# Patient Record
Sex: FEMALE | Race: WHITE | NOT HISPANIC OR LATINO | Employment: OTHER | URBAN - METROPOLITAN AREA
[De-identification: names, ages, dates, MRNs, and addresses within clinical notes are randomized per-mention and may not be internally consistent; named-entity substitution may affect disease eponyms.]

---

## 2017-02-06 ENCOUNTER — HISTORICAL (OUTPATIENT)
Dept: LAB | Facility: HOSPITAL | Age: 62
End: 2017-02-06

## 2017-03-03 ENCOUNTER — HISTORICAL (OUTPATIENT)
Dept: RADIOLOGY | Facility: HOSPITAL | Age: 62
End: 2017-03-03

## 2017-08-24 ENCOUNTER — HISTORICAL (OUTPATIENT)
Dept: LAB | Facility: HOSPITAL | Age: 62
End: 2017-08-24

## 2017-08-24 LAB
ALBUMIN SERPL-MCNC: 3.5 GM/DL (ref 3.4–5)
ALBUMIN/GLOB SERPL: 1.2 RATIO (ref 1.1–2)
ALP SERPL-CCNC: 66 UNIT/L (ref 46–116)
ALT SERPL-CCNC: 28 UNIT/L (ref 12–78)
AST SERPL-CCNC: 21 UNIT/L (ref 15–37)
BILIRUB SERPL-MCNC: 0.8 MG/DL (ref 0.2–1)
BILIRUBIN DIRECT+TOT PNL SERPL-MCNC: 0.15 MG/DL (ref 0–0.2)
BILIRUBIN DIRECT+TOT PNL SERPL-MCNC: 0.65 MG/DL (ref 0–0.8)
BUN SERPL-MCNC: 19.6 MG/DL (ref 7–18)
CALCIUM SERPL-MCNC: 9.3 MG/DL (ref 8.5–10.1)
CHLORIDE SERPL-SCNC: 108 MMOL/L (ref 98–107)
CHOLEST SERPL-MCNC: 261 MG/DL (ref 0–200)
CHOLEST/HDLC SERPL: 4.4 {RATIO} (ref 0–4)
CO2 SERPL-SCNC: 27.4 MMOL/L (ref 21–32)
CREAT SERPL-MCNC: 0.95 MG/DL (ref 0.6–1.3)
GLOBULIN SER-MCNC: 3 GM/DL (ref 2.4–3.5)
GLUCOSE SERPL-MCNC: 105 MG/DL (ref 74–106)
HDLC SERPL-MCNC: 60 MG/DL (ref 40–60)
LDLC SERPL CALC-MCNC: 180 MG/DL (ref 0–129)
POTASSIUM SERPL-SCNC: 4 MMOL/L (ref 3.5–5.1)
PROT SERPL-MCNC: 6.5 GM/DL (ref 6.4–8.2)
SODIUM SERPL-SCNC: 145 MMOL/L (ref 136–145)
TRIGL SERPL-MCNC: 103 MG/DL
VLDLC SERPL CALC-MCNC: 21 MG/DL

## 2017-10-06 ENCOUNTER — HISTORICAL (OUTPATIENT)
Dept: RADIOLOGY | Facility: HOSPITAL | Age: 62
End: 2017-10-06

## 2017-10-27 ENCOUNTER — HISTORICAL (OUTPATIENT)
Dept: LAB | Facility: HOSPITAL | Age: 62
End: 2017-10-27

## 2017-10-27 LAB
CHOLEST SERPL-MCNC: 257 MG/DL (ref 0–200)
CHOLEST/HDLC SERPL: 4.2 {RATIO} (ref 0–4)
CRP SERPL HS-MCNC: 2.02 MG/L (ref 0–3)
HDLC SERPL-MCNC: 61 MG/DL (ref 40–60)
LDLC SERPL CALC-MCNC: 170 MG/DL (ref 0–129)
TRIGL SERPL-MCNC: 132 MG/DL
VLDLC SERPL CALC-MCNC: 26 MG/DL

## 2017-11-29 ENCOUNTER — HISTORICAL (OUTPATIENT)
Dept: LAB | Facility: HOSPITAL | Age: 62
End: 2017-11-29

## 2017-11-29 LAB
CHOLEST SERPL-MCNC: 202 MG/DL (ref 0–200)
CHOLEST/HDLC SERPL: 2.8 {RATIO} (ref 0–4)
HDLC SERPL-MCNC: 72 MG/DL (ref 40–60)
LDLC SERPL CALC-MCNC: 115 MG/DL (ref 0–129)
TRIGL SERPL-MCNC: 76 MG/DL
VLDLC SERPL CALC-MCNC: 15 MG/DL

## 2018-02-22 ENCOUNTER — HISTORICAL (OUTPATIENT)
Dept: LAB | Facility: HOSPITAL | Age: 63
End: 2018-02-22

## 2018-03-05 ENCOUNTER — HISTORICAL (OUTPATIENT)
Dept: LAB | Facility: HOSPITAL | Age: 63
End: 2018-03-05

## 2018-08-18 ENCOUNTER — HISTORICAL (OUTPATIENT)
Dept: LAB | Facility: HOSPITAL | Age: 63
End: 2018-08-18

## 2019-02-02 ENCOUNTER — HISTORICAL (OUTPATIENT)
Dept: LAB | Facility: HOSPITAL | Age: 64
End: 2019-02-02

## 2019-02-02 LAB
ABS NEUT (OLG): 1.59 X10(3)/MCL (ref 2.1–9.2)
ALBUMIN SERPL-MCNC: 3.7 GM/DL (ref 3.4–5)
ALBUMIN/GLOB SERPL: 1.2 RATIO (ref 1.1–2)
ALP SERPL-CCNC: 81 UNIT/L (ref 46–116)
ALT SERPL-CCNC: 28 UNIT/L (ref 12–78)
AST SERPL-CCNC: 22 UNIT/L (ref 15–37)
BASOPHILS # BLD AUTO: 0 X10(3)/MCL (ref 0–0.2)
BASOPHILS NFR BLD AUTO: 1 %
BILIRUB SERPL-MCNC: 1.1 MG/DL (ref 0.2–1)
BILIRUBIN DIRECT+TOT PNL SERPL-MCNC: 0.18 MG/DL (ref 0–0.2)
BILIRUBIN DIRECT+TOT PNL SERPL-MCNC: 0.92 MG/DL (ref 0–0.8)
BUN SERPL-MCNC: 16.1 MG/DL (ref 7–18)
CALCIUM SERPL-MCNC: 9.3 MG/DL (ref 8.5–10.1)
CHLORIDE SERPL-SCNC: 104 MMOL/L (ref 98–107)
CHOLEST SERPL-MCNC: 219 MG/DL (ref 0–200)
CHOLEST/HDLC SERPL: 3.7 {RATIO} (ref 0–4)
CO2 SERPL-SCNC: 27 MMOL/L (ref 21–32)
CREAT SERPL-MCNC: 0.96 MG/DL (ref 0.6–1.3)
EOSINOPHIL # BLD AUTO: 0.2 X10(3)/MCL (ref 0–0.9)
EOSINOPHIL NFR BLD AUTO: 4 %
ERYTHROCYTE [DISTWIDTH] IN BLOOD BY AUTOMATED COUNT: 13.3 % (ref 11.5–17)
GLOBULIN SER-MCNC: 3.1 GM/DL (ref 2.4–3.5)
GLUCOSE SERPL-MCNC: 94 MG/DL (ref 74–106)
HCT VFR BLD AUTO: 42 % (ref 37–47)
HDLC SERPL-MCNC: 59 MG/DL (ref 40–60)
HGB BLD-MCNC: 13.8 GM/DL (ref 12–16)
LDLC SERPL CALC-MCNC: 139 MG/DL (ref 0–129)
LYMPHOCYTES # BLD AUTO: 1.4 X10(3)/MCL (ref 0.6–4.6)
LYMPHOCYTES NFR BLD AUTO: 38 %
MCH RBC QN AUTO: 28.1 PG (ref 27–31)
MCHC RBC AUTO-ENTMCNC: 32.9 GM/DL (ref 33–36)
MCV RBC AUTO: 85.5 FL (ref 80–94)
MONOCYTES # BLD AUTO: 0.4 X10(3)/MCL (ref 0.1–1.3)
MONOCYTES NFR BLD AUTO: 12 %
NEUTROPHILS # BLD AUTO: 1.59 X10(3)/MCL (ref 1.4–7.9)
NEUTROPHILS NFR BLD AUTO: 45 %
PLATELET # BLD AUTO: 239 X10(3)/MCL (ref 130–400)
PMV BLD AUTO: 10.9 FL (ref 9.4–12.4)
POTASSIUM SERPL-SCNC: 4.1 MMOL/L (ref 3.5–5.1)
PROT SERPL-MCNC: 6.8 GM/DL (ref 6.4–8.2)
RBC # BLD AUTO: 4.91 X10(6)/MCL (ref 4.2–5.4)
SODIUM SERPL-SCNC: 141 MMOL/L (ref 136–145)
TRIGL SERPL-MCNC: 103 MG/DL
VLDLC SERPL CALC-MCNC: 21 MG/DL
WBC # SPEC AUTO: 3.6 X10(3)/MCL (ref 4.5–11.5)

## 2019-07-26 ENCOUNTER — HISTORICAL (OUTPATIENT)
Dept: LAB | Facility: HOSPITAL | Age: 64
End: 2019-07-26

## 2019-07-29 ENCOUNTER — TELEPHONE (OUTPATIENT)
Dept: ORTHOPEDICS | Facility: CLINIC | Age: 64
End: 2019-07-29

## 2019-07-29 NOTE — TELEPHONE ENCOUNTER
Received a return call from patient, informed that all documentation has been received and her chart will be submitted to the physicians for evaluation.  Writer explained time line.  Ms. Goins verbalized understanding.

## 2019-07-29 NOTE — TELEPHONE ENCOUNTER
Left message for patient to return call.  Phoned patient to inform of submittal of chart to the physicians for review/approval.

## 2019-08-06 ENCOUNTER — TELEPHONE (OUTPATIENT)
Dept: ORTHOPEDICS | Facility: CLINIC | Age: 64
End: 2019-08-06

## 2019-08-06 NOTE — TELEPHONE ENCOUNTER
"I spoke to the pt, informed her that the medical MD had the following questions:    Pt  denies gallstone problem, jaundice, and right sided abdominal pain and states her weight is stable at 170lbs     Pt  states she is reasonably ambulatory and denies any issues neurologically.    We are requesting past LFT"s and once obtained we will notify and scan into media for review. And call the pt to notify of findings. Understanding verbalized.    "

## 2019-08-07 ENCOUNTER — TELEPHONE (OUTPATIENT)
Dept: ORTHOPEDICS | Facility: CLINIC | Age: 64
End: 2019-08-07

## 2019-08-07 NOTE — TELEPHONE ENCOUNTER
Informed pt that we received the past labs requested by our MD, I have submitted for review, will call once we hear back from the MD. Understanding verbalized.

## 2019-08-12 ENCOUNTER — TELEPHONE (OUTPATIENT)
Dept: ORTHOPEDICS | Facility: CLINIC | Age: 64
End: 2019-08-12

## 2019-08-12 NOTE — TELEPHONE ENCOUNTER
Spoke with patient regarding surgery:    - Informed of surgery date: 8-29-19, she accepted    - Potential dates for Dr. Cervantes's call: anytime after 2 pm on any week day    **she gets off from work at 2 pm.    - Telemed visit scheduled for 8-20-19 @ 2:30    - She wants to go to [facility] or [facility] both in [place], [place] - patient to provide contact information at later date    Patient verbalized understanding of above.

## 2019-08-14 DIAGNOSIS — M17.12 PRIMARY OSTEOARTHRITIS OF LEFT KNEE: Primary | ICD-10-CM

## 2019-08-15 DIAGNOSIS — M17.12 PRIMARY OSTEOARTHRITIS OF LEFT KNEE: Primary | ICD-10-CM

## 2019-08-19 ENCOUNTER — TELEPHONE (OUTPATIENT)
Dept: ORTHOPEDICS | Facility: CLINIC | Age: 64
End: 2019-08-19

## 2019-08-19 NOTE — TELEPHONE ENCOUNTER
Spoke to Cinthia with Shira Stewart NP office, she states that she is agreeable to do the post-operative care. She scheduled 2 week appt 9/12 at 1:40 pm, 6 week appt 10/10 at 1:40pm and 3 month 11/21 at 1:40pm. Understanding verbalized.

## 2019-08-19 NOTE — TELEPHONE ENCOUNTER
Spoke with Mack with Shira Terry office to confirm she is agreeable to do the post-operative care at 2 weeks, 6 weeks and 3 months. Mack stated she will ask and call back. Provided name and direct number. Understanding verbalized.

## 2019-08-20 ENCOUNTER — TELEPHONE (OUTPATIENT)
Dept: ORTHOPEDICS | Facility: CLINIC | Age: 64
End: 2019-08-20

## 2019-08-20 NOTE — TELEPHONE ENCOUNTER
Spoke to pt, discussed the Joint class information and answered questions. Pt states she was unable to set up the portal due to email not working. Scheduled call with Billy on 8/22 at 1500. Pt will be available. Pt provided name of PT location:  [facility]  [address] [place] [address].  Pt states she has DME equipment, instructed her to bring her walker with her.   Understanding verbalized.

## 2019-08-21 ENCOUNTER — TELEPHONE (OUTPATIENT)
Dept: ORTHOPEDICS | Facility: CLINIC | Age: 64
End: 2019-08-21

## 2019-08-21 NOTE — TELEPHONE ENCOUNTER
Spoke to pt, she states she missed a call stating she missed and appointment. Informed her that was the Virtual visit that we did over the phone. Cancelled appointment. Pt verbalized understanding.

## 2019-08-22 ENCOUNTER — TELEPHONE (OUTPATIENT)
Dept: INTERNAL MEDICINE | Facility: CLINIC | Age: 64
End: 2019-08-22

## 2019-08-22 DIAGNOSIS — K21.9 GASTROESOPHAGEAL REFLUX DISEASE, ESOPHAGITIS PRESENCE NOT SPECIFIED: ICD-10-CM

## 2019-08-22 DIAGNOSIS — G60.0 CMT (CHARCOT-MARIE-TOOTH DISEASE): ICD-10-CM

## 2019-08-22 DIAGNOSIS — I10 ESSENTIAL HYPERTENSION: ICD-10-CM

## 2019-08-22 DIAGNOSIS — E78.5 HYPERLIPIDEMIA, UNSPECIFIED HYPERLIPIDEMIA TYPE: ICD-10-CM

## 2019-08-22 DIAGNOSIS — M79.7 FIBROMYALGIA: ICD-10-CM

## 2019-08-22 DIAGNOSIS — Z96.659 STATUS POST KNEE REPLACEMENT, UNSPECIFIED LATERALITY: Primary | ICD-10-CM

## 2019-08-22 DIAGNOSIS — F11.90 CHRONIC, CONTINUOUS USE OF OPIOIDS: ICD-10-CM

## 2019-08-22 PROBLEM — G62.9 PERIPHERAL NEUROPATHY: Status: ACTIVE | Noted: 2019-08-22

## 2019-08-22 RX ORDER — AMLODIPINE BESYLATE 5 MG/1
5 TABLET ORAL DAILY
COMMUNITY
Start: 2017-11-24 | End: 2019-12-18 | Stop reason: DRUGHIGH

## 2019-08-22 RX ORDER — ROPINIROLE 0.5 MG/1
0.5 TABLET, FILM COATED ORAL DAILY PRN
COMMUNITY
Start: 2017-11-24 | End: 2020-07-16 | Stop reason: DRUGHIGH

## 2019-08-22 RX ORDER — ERGOCALCIFEROL 1.25 MG/1
50000 CAPSULE ORAL
Refills: 3 | COMMUNITY
Start: 2019-08-08 | End: 2020-07-16

## 2019-08-22 RX ORDER — GABAPENTIN 300 MG/1
300 CAPSULE ORAL 2 TIMES DAILY
COMMUNITY
Start: 2013-07-23

## 2019-08-22 RX ORDER — EZETIMIBE 10 MG/1
10 TABLET ORAL NIGHTLY
COMMUNITY
Start: 2017-11-24

## 2019-08-22 RX ORDER — TRAMADOL HYDROCHLORIDE 50 MG/1
TABLET ORAL 2 TIMES DAILY PRN
COMMUNITY
Start: 2013-07-23

## 2019-08-22 RX ORDER — ROPINIROLE 4 MG/1
4 TABLET, FILM COATED ORAL NIGHTLY
COMMUNITY
Start: 2019-04-12 | End: 2019-10-09

## 2019-08-22 RX ORDER — DICLOFENAC SODIUM 10 MG/G
2 GEL TOPICAL
COMMUNITY
Start: 2017-04-03 | End: 2019-08-28

## 2019-08-22 RX ORDER — CELECOXIB 200 MG/1
200 CAPSULE ORAL 2 TIMES DAILY
COMMUNITY
Start: 2017-11-24

## 2019-08-22 RX ORDER — ESOMEPRAZOLE MAGNESIUM 40 MG/1
40 CAPSULE, DELAYED RELEASE ORAL DAILY
COMMUNITY
Start: 2013-07-23

## 2019-08-22 RX ORDER — DULOXETIN HYDROCHLORIDE 30 MG/1
30 CAPSULE, DELAYED RELEASE ORAL DAILY
COMMUNITY
Start: 2017-11-24

## 2019-08-22 NOTE — ASSESSMENT & PLAN NOTE
Functional   Stays active at work, home   Neuropathy - takes Gabapentin  Does not ware braces  Mother had CMT and had to wear Braces   Feels that she can use a walker post op

## 2019-08-22 NOTE — TELEPHONE ENCOUNTER
Chief complaint-Preoperative evaluation    Date of Evaluation- 08/22/2019    PCP-  Shira Stewart NP    History of present illness- I had the pleasure of meeting this pleasant 63 y.o. lady in the pre op clinic prior to her elective Orthopedic surgery. The patient is new to me . .    I have obtained the history by speaking to the patient and by reviewing the electronic health records.    Events leading up to surgery / History of presenting illness -    She has been troubled with moderate-severe  Left knee  pain for the past 2 months . Pain increases with activity and at night time and decreases with resting.    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       Review of symptoms    @ROS@    Constitutional - No significant weight changes ,No fever, chills   Eyes- No new vision changes  ENT- STOP BANG - snoring, elevated blood pressure and age over 50   3/8   Cardiac-As above   Respiratory- No cough, expectoration and  no hemoptysis  GI- Bowel movements  regular  No overt GI/ blood losses   -No dysuria and  no urinary hesitancy   MS-As above/  Rt knee pain - better - used Medrol pack May 2019   Neurologic-No unilateral weakness   Psychiatric- No depression,Anxiety  Endocrine-  Prednisone use for over 3 weeks -no  Hematological/Lymphatic-No spontaneous bruising, bleeding    Past  Medical history-     Pertinent negatives-   DVT-  Pulmonary embolism-  Heart disease -  Vascular stenting -    Past Surgical history - reviewed in EPIC    No Anaesthetic,Bleeding ,Cardiac problems with previous surgeries-  No history of delirium -  No history of post operative  nausea, vomiting-     Family history-    FH- No anesthesia,bleeding / venous thrombosis , early onset heart disease in family     Social history- reviewed in EPIC    Lives with    Help available post op     Medications and Allergies - reviewed in EPIC      Patient  was instructed to call and update me about any changes to health,  medication, office visits ,testing out side of the [facility] operative care center , hospitalizations between now and surgery     Dr BENJAMIN Cervantes MD MRCP ( ),[facility]   [facility] for Perioperative [facility]  [facility]   Pager [phone], [place] [phone]

## 2019-08-22 NOTE — ASSESSMENT & PLAN NOTE
Tramadol use for 6 years     Chronic continuous opioid use- In view of the opioid use, the patient may have opioid tolerance . I suggest considering the possibility of opioid tolerance  in planning post operative pain control

## 2019-08-22 NOTE — ASSESSMENT & PLAN NOTE
Cymbalta    I suggest monitoring the sodium as SIADH from Cymbalta   use and hypersecretion of ADH associated with surgery can reduce sodium in the perioperative period

## 2019-08-22 NOTE — PROGRESS NOTES
Orders for post op PT after knee replacement entered for [facility] in [place] as requested.

## 2019-08-22 NOTE — ASSESSMENT & PLAN NOTE
Nexium  GERD-  I suggest continuation of the Proton pump inhibitor in the perioperative period . I suggest aspiration precautions  Does not sound Cardiac in nature

## 2019-08-23 ENCOUNTER — TELEPHONE (OUTPATIENT)
Dept: INTERNAL MEDICINE | Facility: CLINIC | Age: 64
End: 2019-08-23

## 2019-08-23 DIAGNOSIS — I87.2 CHRONIC VENOUS INSUFFICIENCY: ICD-10-CM

## 2019-08-23 RX ORDER — IBUPROFEN 200 MG
200 TABLET ORAL
Status: ON HOLD | COMMUNITY
End: 2019-08-29 | Stop reason: HOSPADM

## 2019-08-24 NOTE — ASSESSMENT & PLAN NOTE
Uses compression stockings    Increased risk of thrombosis in the yeison operative period , compression stocking use discussed

## 2019-08-24 NOTE — TELEPHONE ENCOUNTER
8/23/2019 - 20 59     Called and spoke to her   Hold Vitamin D, Celebrex , Advil in preparation for surgery     Elevated Indirect Bilirubin -   No suggestion of  hepatic decompensation   Not known to have liver disease     No hemolysis    ? Fasting lab related     EKG 9/6/2018 - SR      No changes to Medication, health

## 2019-08-28 ENCOUNTER — OFFICE VISIT (OUTPATIENT)
Dept: ORTHOPEDICS | Facility: CLINIC | Age: 64
End: 2019-08-28
Payer: COMMERCIAL

## 2019-08-28 ENCOUNTER — HOSPITAL ENCOUNTER (OUTPATIENT)
Dept: PREADMISSION TESTING | Facility: HOSPITAL | Age: 64
Discharge: HOME | End: 2019-08-28
Attending: ORTHOPAEDIC SURGERY
Payer: COMMERCIAL

## 2019-08-28 ENCOUNTER — INITIAL CONSULT (OUTPATIENT)
Dept: INTERNAL MEDICINE | Facility: CLINIC | Age: 64
End: 2019-08-28
Payer: COMMERCIAL

## 2019-08-28 ENCOUNTER — HOSPITAL ENCOUNTER (OUTPATIENT)
Dept: RADIOLOGY | Facility: HOSPITAL | Age: 64
Discharge: HOME | End: 2019-08-28
Attending: ORTHOPAEDIC SURGERY
Payer: COMMERCIAL

## 2019-08-28 ENCOUNTER — ANESTHESIA EVENT (OUTPATIENT)
Dept: SURGERY | Facility: HOSPITAL | Age: 64
DRG: 470 | End: 2019-08-28
Payer: COMMERCIAL

## 2019-08-28 VITALS
HEIGHT: 62 IN | WEIGHT: 166.25 LBS | BODY MASS INDEX: 30.59 KG/M2 | WEIGHT: 166.25 LBS | BODY MASS INDEX: 30.59 KG/M2 | HEIGHT: 62 IN

## 2019-08-28 VITALS
OXYGEN SATURATION: 98 % | SYSTOLIC BLOOD PRESSURE: 136 MMHG | TEMPERATURE: 98 F | WEIGHT: 166 LBS | HEIGHT: 62 IN | HEART RATE: 79 BPM | DIASTOLIC BLOOD PRESSURE: 80 MMHG | BODY MASS INDEX: 30.55 KG/M2

## 2019-08-28 DIAGNOSIS — I87.2 CHRONIC VENOUS INSUFFICIENCY: ICD-10-CM

## 2019-08-28 DIAGNOSIS — Z79.1 NSAID LONG-TERM USE: ICD-10-CM

## 2019-08-28 DIAGNOSIS — M17.12 PRIMARY OSTEOARTHRITIS OF LEFT KNEE: Primary | ICD-10-CM

## 2019-08-28 DIAGNOSIS — K21.9 GASTROESOPHAGEAL REFLUX DISEASE, ESOPHAGITIS PRESENCE NOT SPECIFIED: ICD-10-CM

## 2019-08-28 DIAGNOSIS — F11.90 CHRONIC, CONTINUOUS USE OF OPIOIDS: ICD-10-CM

## 2019-08-28 DIAGNOSIS — M17.12 PRIMARY OSTEOARTHRITIS OF LEFT KNEE: ICD-10-CM

## 2019-08-28 DIAGNOSIS — G25.81 RLS (RESTLESS LEGS SYNDROME): ICD-10-CM

## 2019-08-28 DIAGNOSIS — Z87.891 HISTORY OF TOBACCO ABUSE: ICD-10-CM

## 2019-08-28 DIAGNOSIS — M79.7 FIBROMYALGIA: ICD-10-CM

## 2019-08-28 DIAGNOSIS — R29.898 JAW CLICKING: ICD-10-CM

## 2019-08-28 DIAGNOSIS — R06.83 SNORING: ICD-10-CM

## 2019-08-28 DIAGNOSIS — R60.9 EDEMA, UNSPECIFIED TYPE: ICD-10-CM

## 2019-08-28 DIAGNOSIS — Z01.818 PREOP EXAMINATION: Primary | ICD-10-CM

## 2019-08-28 DIAGNOSIS — I10 ESSENTIAL HYPERTENSION: ICD-10-CM

## 2019-08-28 DIAGNOSIS — E78.5 HYPERLIPIDEMIA, UNSPECIFIED HYPERLIPIDEMIA TYPE: ICD-10-CM

## 2019-08-28 DIAGNOSIS — Z96.652 S/P TKR (TOTAL KNEE REPLACEMENT) USING CEMENT, LEFT: ICD-10-CM

## 2019-08-28 DIAGNOSIS — R17 ELEVATED BILIRUBIN: ICD-10-CM

## 2019-08-28 DIAGNOSIS — G60.0 CMT (CHARCOT-MARIE-TOOTH DISEASE): ICD-10-CM

## 2019-08-28 PROCEDURE — 73560 XR KNEE 1 OR 2 VIEW LEFT: ICD-10-PCS | Mod: 26,LT,COE, | Performed by: RADIOLOGY

## 2019-08-28 PROCEDURE — 99999 PR PBB SHADOW E&M-EST. PATIENT-LVL III: CPT | Mod: PBBFAC,COE,, | Performed by: NURSE PRACTITIONER

## 2019-08-28 PROCEDURE — 99999 PR PBB SHADOW E&M-EST. PATIENT-LVL III: ICD-10-PCS | Mod: PBBFAC,COE,, | Performed by: ORTHOPAEDIC SURGERY

## 2019-08-28 PROCEDURE — 99499 NO LOS: ICD-10-PCS | Mod: S$GLB,COE,, | Performed by: NURSE PRACTITIONER

## 2019-08-28 PROCEDURE — 73560 X-RAY EXAM OF KNEE 1 OR 2: CPT | Mod: TC,LT

## 2019-08-28 PROCEDURE — 99499 NO LOS: ICD-10-PCS | Mod: S$GLB,COE,, | Performed by: ORTHOPAEDIC SURGERY

## 2019-08-28 PROCEDURE — 99999 PR PBB SHADOW E&M-EST. PATIENT-LVL III: ICD-10-PCS | Mod: PBBFAC,COE,, | Performed by: HOSPITALIST

## 2019-08-28 PROCEDURE — 99999 PR PBB SHADOW E&M-EST. PATIENT-LVL III: ICD-10-PCS | Mod: PBBFAC,COE,, | Performed by: NURSE PRACTITIONER

## 2019-08-28 PROCEDURE — 99244 PR OFFICE CONSULTATION,LEVEL IV: ICD-10-PCS | Mod: S$GLB,COE,, | Performed by: HOSPITALIST

## 2019-08-28 PROCEDURE — 99999 PR PBB SHADOW E&M-EST. PATIENT-LVL III: CPT | Mod: PBBFAC,COE,, | Performed by: ORTHOPAEDIC SURGERY

## 2019-08-28 PROCEDURE — 73560 X-RAY EXAM OF KNEE 1 OR 2: CPT | Mod: 26,LT,COE, | Performed by: RADIOLOGY

## 2019-08-28 PROCEDURE — 99499 UNLISTED E&M SERVICE: CPT | Mod: S$GLB,COE,, | Performed by: NURSE PRACTITIONER

## 2019-08-28 PROCEDURE — 99244 OFF/OP CNSLTJ NEW/EST MOD 40: CPT | Mod: S$GLB,COE,, | Performed by: HOSPITALIST

## 2019-08-28 PROCEDURE — 99999 PR PBB SHADOW E&M-EST. PATIENT-LVL III: CPT | Mod: PBBFAC,COE,, | Performed by: HOSPITALIST

## 2019-08-28 PROCEDURE — 99499 UNLISTED E&M SERVICE: CPT | Mod: S$GLB,COE,, | Performed by: ORTHOPAEDIC SURGERY

## 2019-08-28 RX ORDER — ACETAMINOPHEN 500 MG
1000 TABLET ORAL EVERY 6 HOURS
Status: CANCELLED | OUTPATIENT
Start: 2019-08-28 | End: 2019-08-30

## 2019-08-28 RX ORDER — TRAMADOL HYDROCHLORIDE 50 MG/1
TABLET ORAL
COMMUNITY
Start: 2013-07-23 | End: 2019-08-28 | Stop reason: CLARIF

## 2019-08-28 RX ORDER — AMOXICILLIN 250 MG
1 CAPSULE ORAL 2 TIMES DAILY
Status: CANCELLED | OUTPATIENT
Start: 2019-08-28

## 2019-08-28 RX ORDER — SODIUM CHLORIDE 9 MG/ML
INJECTION, SOLUTION INTRAVENOUS CONTINUOUS
Status: CANCELLED | OUTPATIENT
Start: 2019-08-28 | End: 2019-08-29

## 2019-08-28 RX ORDER — MORPHINE SULFATE 10 MG/ML
2 INJECTION, SOLUTION INTRAMUSCULAR; INTRAVENOUS
Status: CANCELLED | OUTPATIENT
Start: 2019-08-28

## 2019-08-28 RX ORDER — CELECOXIB 100 MG/1
200 CAPSULE ORAL DAILY
Status: CANCELLED | OUTPATIENT
Start: 2019-08-28

## 2019-08-28 RX ORDER — FENTANYL CITRATE 50 UG/ML
25 INJECTION, SOLUTION INTRAMUSCULAR; INTRAVENOUS EVERY 5 MIN PRN
Status: CANCELLED | OUTPATIENT
Start: 2019-08-28

## 2019-08-28 RX ORDER — OXYCODONE HYDROCHLORIDE 5 MG/1
10 TABLET ORAL
Status: CANCELLED | OUTPATIENT
Start: 2019-08-28

## 2019-08-28 RX ORDER — ROPIVACAINE HYDROCHLORIDE 2 MG/ML
8 INJECTION, SOLUTION EPIDURAL; INFILTRATION; PERINEURAL CONTINUOUS
Status: CANCELLED | OUTPATIENT
Start: 2019-08-28

## 2019-08-28 RX ORDER — MIDAZOLAM HYDROCHLORIDE 1 MG/ML
1 INJECTION INTRAMUSCULAR; INTRAVENOUS EVERY 5 MIN PRN
Status: CANCELLED | OUTPATIENT
Start: 2019-08-28

## 2019-08-28 RX ORDER — CELECOXIB 100 MG/1
400 CAPSULE ORAL
Status: CANCELLED | OUTPATIENT
Start: 2019-08-28

## 2019-08-28 RX ORDER — NALOXONE HCL 0.4 MG/ML
0.02 VIAL (ML) INJECTION
Status: CANCELLED | OUTPATIENT
Start: 2019-08-28

## 2019-08-28 RX ORDER — LIDOCAINE HYDROCHLORIDE 10 MG/ML
1 INJECTION, SOLUTION EPIDURAL; INFILTRATION; INTRACAUDAL; PERINEURAL
Status: CANCELLED | OUTPATIENT
Start: 2019-08-28

## 2019-08-28 RX ORDER — PREGABALIN 25 MG/1
75 CAPSULE ORAL
Status: CANCELLED | OUTPATIENT
Start: 2019-08-28

## 2019-08-28 RX ORDER — SODIUM CHLORIDE 9 MG/ML
INJECTION, SOLUTION INTRAVENOUS
Status: CANCELLED | OUTPATIENT
Start: 2019-08-28

## 2019-08-28 RX ORDER — ONDANSETRON 2 MG/ML
4 INJECTION INTRAMUSCULAR; INTRAVENOUS EVERY 8 HOURS PRN
Status: CANCELLED | OUTPATIENT
Start: 2019-08-28

## 2019-08-28 RX ORDER — MUPIROCIN 20 MG/G
1 OINTMENT TOPICAL
Status: CANCELLED | OUTPATIENT
Start: 2019-08-28

## 2019-08-28 RX ORDER — MUPIROCIN 20 MG/G
1 OINTMENT TOPICAL 2 TIMES DAILY
Status: CANCELLED | OUTPATIENT
Start: 2019-08-28 | End: 2019-09-02

## 2019-08-28 RX ORDER — PREGABALIN 25 MG/1
75 CAPSULE ORAL NIGHTLY
Status: CANCELLED | OUTPATIENT
Start: 2019-08-28

## 2019-08-28 RX ORDER — CELECOXIB 200 MG/1
CAPSULE ORAL
COMMUNITY
Start: 2017-11-24 | End: 2019-08-28 | Stop reason: CLARIF

## 2019-08-28 RX ORDER — OXYCODONE HYDROCHLORIDE 5 MG/1
5 TABLET ORAL
Status: CANCELLED | OUTPATIENT
Start: 2019-08-28

## 2019-08-28 RX ORDER — OXYCODONE HYDROCHLORIDE 5 MG/1
15 TABLET ORAL
Status: CANCELLED | OUTPATIENT
Start: 2019-08-28

## 2019-08-28 RX ORDER — RAMELTEON 8 MG/1
8 TABLET ORAL NIGHTLY PRN
Status: CANCELLED | OUTPATIENT
Start: 2019-08-28

## 2019-08-28 RX ORDER — ACETAMINOPHEN 10 MG/ML
1000 INJECTION, SOLUTION INTRAVENOUS ONCE
Status: CANCELLED | OUTPATIENT
Start: 2019-08-28 | End: 2019-08-28

## 2019-08-28 RX ORDER — SODIUM CHLORIDE 0.9 % (FLUSH) 0.9 %
10 SYRINGE (ML) INJECTION
Status: CANCELLED | OUTPATIENT
Start: 2019-08-28

## 2019-08-28 RX ORDER — DULOXETIN HYDROCHLORIDE 30 MG/1
30 CAPSULE, DELAYED RELEASE ORAL
COMMUNITY
Start: 2017-11-24 | End: 2019-08-28 | Stop reason: CLARIF

## 2019-08-28 RX ORDER — FAMOTIDINE 20 MG/1
20 TABLET, FILM COATED ORAL 2 TIMES DAILY
Status: CANCELLED | OUTPATIENT
Start: 2019-08-28

## 2019-08-28 RX ORDER — POLYETHYLENE GLYCOL 3350 17 G/17G
17 POWDER, FOR SOLUTION ORAL DAILY
Status: CANCELLED | OUTPATIENT
Start: 2019-08-28

## 2019-08-28 RX ORDER — AMLODIPINE BESYLATE 5 MG/1
5 TABLET ORAL
COMMUNITY
Start: 2017-11-24 | End: 2019-08-28 | Stop reason: CLARIF

## 2019-08-28 RX ORDER — BISACODYL 10 MG
10 SUPPOSITORY, RECTAL RECTAL EVERY 12 HOURS PRN
Status: CANCELLED | OUTPATIENT
Start: 2019-08-28

## 2019-08-28 RX ORDER — ASPIRIN 81 MG/1
81 TABLET ORAL 2 TIMES DAILY
Status: CANCELLED | OUTPATIENT
Start: 2019-08-28

## 2019-08-28 NOTE — PROGRESS NOTES
[facility] - Pre Op Consult  Progress Note    Patient Name: Lora Goins  MRN: [id number]  Date of Evaluation- 08/28/2019  PCP- Shira Stewart NP    Future cases for Lora oGins [[id number]]     Case ID Status Date Time Roland Procedure Provider Location    [id number] [facility] 8/29/2019 10:20  ARTHROPLASTY, KNEE, TOTAL-CARLOT NELSY Chapito Elkins MD [[id number]] [facility] OR 2ND FLR      Left     HPI:  History of present illness- I had the pleasure of meeting this pleasant 63 y.o. lady in the pre op clinic prior to her elective Orthopedic surgery. The patient is new to me . Lora was accompanied by  Brown.    I have obtained the history by speaking to the patient and by reviewing the electronic health records.    Events leading up to surgery / History of presenting illness -    She has been troubled with moderate-severe  left knee  pain for 1 year  . Pain increases with activity and decreases with resting.    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[employer], [employer] boxes   On her feet a lot - 7 hours a day     Has Osteoarthritis      Subjectively describes health as good      Health conditions of significance for the perioperative period - HTN, Acid reflux, Opioid use     Lives with  - single level house - [place]     Not known to have heart disease , Diabetes Mellitus, Lung disease         Subjective/ Objective:          Chief complaint-Preoperative evaluation, Perioperative Medical management, complication reduction plan     Active cardiac conditions- none    Revised cardiac risk index predictors- none    Functional capacity -Examples of physical activity, physical work,was riding the bicycle, treadmill until 6 months ago    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       Review of Systems   Constitutional: Negative for chills and fever.        No unusual weight changes     HENT:        STOPBANG score 3 / 8    Loud Snoring     HTN  Age over 50        Eyes:        No new visual changes   Respiratory:        No cough , phlegm    No Hemoptysis   Cardiovascular:        As noted   Gastrointestinal:        No overt GI/ blood losses  Bowel movements- Regular   Endocrine:        Prednisone use > 20 mg daily for 3 weeks-None   Genitourinary: Negative for dysuria.        No urinary hesitancy    Musculoskeletal:        As above   Not new   Left  hip bursitis - Steroid injection Mid May 2019  Rt knee pain -Steroid injection Mid May 2019     Skin: Negative for rash.   Neurological: Negative for syncope.        No unilateral weakness   Hematological:        Current use of Anticoagulants  None    Psychiatric/Behavioral:        No Depression,Anxiety     No vascular stenting     No past medical history pertinent negatives.        No anesthesia, bleeding, cardiac problems, PONV with previous surgeries/procedures.  Medications and Allergies reviewed in epic.   FH- No bleeding / venous thrombosis , early onset heart disease in family     Physical Exam  Blood pressure 136/80, pulse 79, temperature 98.2 °F (36.8 °C), height 5' 2" (1.575 m), weight 75.3 kg (166 lb), SpO2 98 %.    Physical Exam  Constitutional- Vitals - Body mass index is 30.36 kg/m².,   Vitals:    08/28/19 0901   BP: 136/80   Pulse: 79   Temp: 98.2 °F (36.8 °C)     General appearance-Conscious,Coherent  Eyes- No conjunctival icterus,pupils  round  and reactive to light   ENT-Oral cavity- moist  , Hearing grossly normal   Neck- No thyromegaly ,Trachea -central, No jugular venous distension,   No Carotid Bruit   Cardiovascular -Heart Sounds- Normal  and  no murmur   , No gallop rhythm   Respiratory - ? cyst mid back - suggested follow up , Normal " Respiratory Effort, Normal breath sounds,  no wheeze  and  no forced expiratory wheeze    Peripheral pitting pedal edema-- mild, no calf pain   Gastrointestinal -Soft abdomen, No palpable masses, Non Tender,Liver,Spleen not palpable. No-- free fluid and shifting dullness  Musculoskeletal- No finger Clubbing. Strength grossly normal   Lymphatic-No Palpable cervical, axillary,Inguinal lymphadenopathy   Psychiatric - normal effect,Orientation  Rt Dorsalis pedis pulses-palpable    Lt Dorsalis pedis pulses- palpable   Rt Posterior tibial pulses -palpable   Left posterior tibial pulses -palpable   Miscellaneous -  no asterixis,  no dupuytren's contracture,  Surgical scarlower abdomen   and  no renal bruit    Investigations  Lab and Imaging have been reviewed in epic.    2016 - No definite acute abdominal pathology. The liver and gallbladder are normal and there is no biliary duct dilation.  The spleen is normal  July 26 th 2019     Hb, HCT, PLT- N  BUN,ALB, Creat - N    Elevated Indirect Bilirubin -   No suggestion of  hepatic decompensation   Not known to have liver disease      No hemolysis     ? Fasting lab related      EKG 9/6/2018 - SR             Review of old records- Was done and information gathered regards to events leading to surgery and health conditions of significance in the perioperative period.        Preoperative cardiac risk assessment-  The patient does not have any active cardiac conditions . Revised cardiac risk index predictors- 0---.Functional capacity is more than 4 Mets. She will be undergoing a Orthopedic procedure that carries a intermediate risk     Risk of a major Cardiac event ( Defined as death, myocardial infarction, or cardiac arrest at 30 days after noncardiac surgery), based on RCRI score     -3.9%         No further cardiac work up is indicated prior to proceeding with the surgery          American Society of Anesthesiologists Physical status classification ( ASA ) class:3       Postoperative pulmonary complication risk assessment:      ARISCAT ( Canet) risk index- risk class -  Low, if duration of surgery is under 3 hours, intermediate, if duration of surgery is over 3 hours      Juan Respiratory failure index- percentage risk of respiratory failure: 0.5 %     Assessment/Plan:     CMT (Charcot-Kayla-Tooth disease)  Functional   Stays active at work, home   Neuropathy - takes Gabapentin  Does not ware braces  Mother had CMT and had to wear Braces   Feels that she can use a walker post op     Peripheral neuropathy  Charcot Kayla Tooth related   Not a diabetic  Feet care suggested    RLS (restless legs syndrome)  Requip   Not known to have sleep apnea  Not known to have low Iron    Chronic, continuous use of opioids  Tramadol use for 10 years for Neuropathy      Chronic continuous opioid use- In view of the opioid use, the patient may have opioid tolerance . I suggest considering the possibility of opioid tolerance  in planning post operative pain control     Not known to have seizures - Tramadol can lower seizure threshold     Chronic venous insufficiency  Uses compression stockings     Increased risk of thrombosis in the yeison operative period , compression stocking use discussed    Essential hypertension  Amlodipine   BP- 130/?   To her understanding doing good       Hypertension-  Blood pressure is acceptable . I suggest continuation of  Amlodipine   - during the entire perioperative period. I suggest blood pressure,monitoring .I suggest addressing pain control as uncontrolled pain can increased blood pressure     Hyperlipidemia  Zetia   Statin intolerance    GERD (gastroesophageal reflux disease)  No recent problem   GERD-  I suggest continuation of the Proton pump inhibitor in the perioperative period . I suggest aspiration precautions  GERD  Symptoms -acid taste to the throat   Does not sound Cardiac     Fibromyalgia  Fibromyalgia   Helped by Cymbalta  I suggest monitoring the  sodium as SIADH from Cymblata  use and hypersecretion of ADH associated with surgery can reduce sodium in the perioperative period    Snoring  STOPBANG score 3 / 8      Possible sleep apnea- I suggest a sleep study and suggest caution with usage of medication that can cause respiratory suppression in the perioperative period    potential ramifications of untreated sleep apnea, which could include daytime sleepiness, hypertension, heart disease and stroke were discussed    Avoidance of  supine sleep, weight gain , alcoholic beverages , care with , sedative , CNS depressant use indicated  since all of these can worsen GOLDIE         History of tobacco abuse  15 pack years   Quit in 2003     Not known to have COPD, Bronchitis, Emphysema, wheezing , chronic phlegm   No inhaler, oxygen use     Elevated bilirubin  Elevated Indirect Bilirubin -   No suggestion of  hepatic decompensation   Not known to have liver disease      No hemolysis per history      ? Fasting lab related    INR not elevated     No history of cirrhosis of liver or suggestion hepatic decompensation        Jaw clicking   I suggest that the perioperative team be aware of this so that appropriate  care can be taken     Edema  Edema- I suggested avoidance of added salt,avoidance of NSAID's, unless advised or ordered  and suggested Limb elevation and magalis hose use    NSAID long-term use  Was on Meloxicam, Vioxx   Celebrex     Renal , ulcer effects discussed         Preventive perioperative care    Thromboembolic prophylaxis:  Her risk factors for thrombosis include surgical procedure and age.I suggest  thromboembolic prophylaxis ( mechanical/pharmacological, weighing the risk benefits of pharmacological agent use considering yeison procedural bleeding )  during the perioperative period.I suggested being active in the post operative period. The patient is a candidate for extended DVT prophylaxis     Postoperative pulmonary complication prophylaxis-Risk factors for  post operative pulmonary complications include possible sleep apnea  ASA class >2- I suggest incentive spirometry use, early ambulation and end tidal carbon dioxide monitoring  , oral care , head end of bed elevation      Renal complication prophylaxis-Risk factors for renal complications include hypertension . I suggest keeping her well hydrated  in the perioperative period.I suggested drinking 2 litre's of water a day      Surgical site Infection Prophylaxis-I  suggest appropriate antibiotic for Prophylaxis against Surgical site infections        In view of regional anesthesia  the patient  is at risk of postoperative urinary retention.  I suggest avoidance / minimizing the of  Benzodiazepines,Anticholinergic medication,antihistamines ( Benadryl) , if possible in the perioperative period. I suggest using the minimum possible use of opioids for the minimum period of time in the perioperative period. Benadryl avoidance suggested      This visit was focused on Preoperative evaluation, Perioperative Medical management, complication reduction plans. I suggest that the patient follows up with primary care or relevant sub specialists for ongoing health care.    I appreciate the opportunity to be involved in this patients care. Please feel free to contact me if there were any questions about this consultation.    Patient is optimized     Patient  was instructed to call and update me about any changes to health,  medication, office visits ,testing out side of the [facility] operative care center , hospitalizations between now and surgery     Myrtle Cervantes MD  Perioperative Medicine  [facility]   Pager [phone]  -    Last took Celebrex 8 /23/2019

## 2019-08-28 NOTE — DISCHARGE INSTRUCTIONS
Your surgery has been scheduled for:__________________________________________    You should report to:  ____[facility], located on the West side of the first floor of the           [facility] ([phone])  ____[facility], located on the [facility] side of the            Second floor of the [facility] ([phone])  ____3rd Floor [facility] located on the [facility] side of the [facility] [phone]  Please Note   - Tell your doctor if you take Aspirin, products containing Aspirin, herbal medications  or blood thinners, such as Coumadin, Ticlid, or Plavix.  (Consult your provider regarding holding or stopping before surgery).  - Arrange for someone to drive you home following surgery.  You will not be allowed to leave the surgical facility alone or drive yourself home following sedation and anesthesia.  Before Surgery  - Stop taking all herbal medications 14days prior to surgery  - No Motrin/Advil (Ibuprofen) 7 days before surgery  - No Aleve (Naproxen) 7 days before surgery  - Stop Taking Asprin, products containing Asprin _____days before surgery  - Stop taking blood thinners_______days before surgery  - No Goody's/BC  Powder 7 days before surgery  - Refrain from drinking alcoholic beverages for 24hours before and after surgery  - Stop or limit smoking _________days before surgery  - You may take Tylenol for pain  Night before Surgery  - Do not eat or drink after midnight  - Take a shower or bath (shower is recommended).  Bathe with Hibiclens soap or an antibacterial soap from the neck down.  If not supplied by your surgeon, hibiclens soap will need to be purchased over the counter in pharmacy.  Rinse soap off thoroughly.  - Shampoo your hair with your regular shampoo  The Day of Surgery  - Take another bath or shower with hibiclens or any antibacterial soap, to reduce the chance of infection.  - Take heart  and blood pressure medications with a small sip of water, as advised by the perioperative team.  - Do not take fluid pills  - You may brush your teeth and rinse your mouth, but do not swall any additional water.   - Do not apply perfumes, powder, body lotions or deodorant on the day of surgery.  - Nail polish should be removed.  - Do not wear makeup or moisturizer  - Wear comfortable clothes, such as a button front shirt and loose fitting pants.  - Leave all jewelry, including body piercings, and valuables at home.    - Bring any devices you will neeed after surgery such as crutches or canes.  - If you have sleep apnea, please bring your CPAP machine  In the event that your physical condition changes including the onset of a cold or respiratory illness, or if you have to delay or cancel your surgery, please notify your surgeon.   Anesthesia: Regional Anesthesia    Youre scheduled for surgery. During surgery, youll receive medicine called anesthesia to keep you comfortable and pain-free. Your surgeon has decided that youll receive regional anesthesia. This sheet tells you what to expect with this type of anesthesia.  What is regional anesthesia?  Regional anesthesia numbs one region of your body. The anesthesia may be given around nerves or into veins in your arms, neck, or legs (nerve block or Tommy block). Or it may be sent into the spinal fluid (spinal anesthesia) or into the space just outside the spinal fluid (epidural anesthesia). You may also be given sedatives to help you relax.  Nerve block or [place]evilla-Bacavi block  A small area of the body, such as an arm or leg, can be numbed using a nerve block or [place]-Bacavi block.  · Nerve block. During a nerve block, your skin is numbed. A needle is then inserted near nerves that serve the area to be numbed. Anesthetic is sent through the needle.  · IV regional or [place]-Bacavi block. For this type of block, an IV line is put into a vein. The blood flow to the area to be numbed is blocked for  a short time. Anesthetic is sent through the IV.  Spinal anesthesia  Spinal anesthesia numbs your body from about the waist down.  · Anesthetic is injected into the spinal fluid. This is a substance that surrounds the spinal cord in your spinal column. The anesthetic blocks pain traveling from the body to the brain.  · To receive the anesthetic, your skin is numbed at the injection site on your back.  · A needle is then inserted into the spinal space. Anesthetic is sent into the spinal fluid through the needle.  Epidural anesthesia  Epidural anesthesia is most commonly used during childbirth and may also be used after surgical procedures of the chest, belly, and legs.  · Anesthetic is injected into the epidural space. This is just outside the dural sac which contains the spinal fluid.  · To receive the anesthetic, your skin is numbed at the injection site on your back.  · A needle is then inserted into the epidural space. Anesthetic is sent into the epidural space through the needle.  · A small flexible catheter may be attached to the needle and left in place. This allows for continuous injections or infusions of anesthetic.  Anesthesia tools and medicines that might be near you during your procedure  · Local anesthetic. This medicine is given through a needle numbs one region of your body.  · Electrocardiography leads (electrodes). These are used to record your heart rate and rhythm.  · Blood pressure cuff. A cuff is placed on your arm to keep track of your blood pressure.  · Pulse oximeter. This small clip is placed on the end of the finger. It measures your blood oxygen level.  · Sedatives. These medicines may be given through an IV. They help to relax you and keep you comfortable. You may stay awake or sleep lightly.  · Oxygen. You may be given oxygen through a facemask.  Risks and possible complications  Regional anesthesia carries some risks. These include:  · Nausea and  vomiting  · Headache  · Backache  · Decreased blood pressure  · Allergic reaction to the anesthetic  · Ongoing numbness (rare)  · Irregular heartbeat (rare)  · Cardiac arrest (rare)   Date Last Reviewed: 12/1/2016  © 2873-3777 [employer]. [address] [place] [address]. All rights reserved. This information is not intended as a substitute for professional medical care. Always follow your healthcare professional's instructions.

## 2019-08-28 NOTE — LETTER
August 28, 2019      Chapito Elkins MD  [address]  [place] [address]           [facility] - [facility]  [address] [facility] [address]  Phone: [phone]          Patient: Lora Goins   MR Number: [id number]   YOB: 1955   Date of Visit: 8/28/2019       Dear Dr. Chapito Elkins:    Thank you for referring Lora Goins to me for evaluation. Attached you will find relevant portions of my assessment and plan of care.    If you have questions, please do not hesitate to call me. I look forward to following Lora Goins along with you.    Sincerely,    Myrtle Cervantes MD    Enclosure  CC:  No Recipients    If you would like to receive this communication electronically, please contact [email or web address] or [phone] to request more information on [employer] access.    For providers and/or their staff who would like to refer a patient to [facility], please contact us through our one-stop-shop provider referral line, [facility], at [phone].    If you feel you have received this communication in error or would no longer like to receive these types of communications, please e-mail [email or web address]

## 2019-08-28 NOTE — ASSESSMENT & PLAN NOTE
Edema- I suggested avoidance of added salt,avoidance of NSAID's, unless advised or ordered  and suggested Limb elevation and magalis hose use

## 2019-08-28 NOTE — HPI
History of present illness- I had the pleasure of meeting this pleasant 63 y.o. lady in the pre op clinic prior to her elective Orthopedic surgery. The patient is new to me . Lora was accompanied by  Brown.    I have obtained the history by speaking to the patient and by reviewing the electronic health records.    Events leading up to surgery / History of presenting illness -    She has been troubled with moderate-severe  left knee  pain for 1 year  . Pain increases with activity and decreases with resting.    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     
Skin normal color for race, warm, dry and intact. No evidence of rash.

## 2019-08-28 NOTE — H&P (VIEW-ONLY)
CC: Left knee pain    Lora Goins is a 63 y.o. female with a history of Left knee pain. Pain is worse with activity and weight bearing.  Patient has experienced interference of activities of daily living due to decreased range of motion and an increase in joint pain and swelling.  Patient has failed non-operative treatment including NSAIDs, corticosteroid injections, viscosupplement injections, and activity modification.  Lora Goins currently ambulates without assistive device.     Relevant medical conditions of significance in perioperative period:  HTN- on medication managed by pcp  GERD- on medication managed by pcp  Hyperlipidemia- on medication managed by pcp    Past Medical History:   Diagnosis Date    Charcot Kayla Tooth muscular atrophy     Fibromyalgia     Hypertension     Hypertension        Past Surgical History:   Procedure Laterality Date     SECTION      2     SECTION      FOOT SURGERY      REMOVAL OF PLANTAR WART USING LASER      TONSILLECTOMY         Family History   Problem Relation Age of Onset    Cancer Mother     Cancer Father     Cancer Sister        Review of patient's allergies indicates:   Allergen Reactions    Ezetimibe-simvastatin      Cramps    Lovastatin      cramps    Pitavastatin      cramps    Pravastatin          Current Outpatient Medications:     amLODIPine (NORVASC) 5 MG tablet, Take 5 mg by mouth., Disp: , Rfl:     amLODIPine (NORVASC) 5 MG tablet, Take 5 mg by mouth., Disp: , Rfl:     celecoxib (CELEBREX) 200 MG capsule, Take 200 mg by mouth., Disp: , Rfl:     celecoxib (CELEBREX) 200 MG capsule, , Disp: , Rfl:     diclofenac sodium (VOLTAREN) 1 % Gel, Apply 2 g topically., Disp: , Rfl:     DULoxetine (CYMBALTA) 30 MG capsule, Take 30 mg by mouth., Disp: , Rfl:     DULoxetine (CYMBALTA) 30 MG capsule, Take 30 mg by mouth., Disp: , Rfl:     esomeprazole (NEXIUM) 40 MG capsule, Take by mouth., Disp: , Rfl:     ezetimibe (ZETIA) 10 mg  "tablet, Take 10 mg by mouth., Disp: , Rfl:     gabapentin (NEURONTIN) 300 MG capsule, Take 300 mg by mouth., Disp: , Rfl:     ibuprofen (ADVIL,MOTRIN) 200 MG tablet, Take 200 mg by mouth as needed. Pain, Disp: , Rfl:     LOVASTATIN ORAL,  0 Refill(s), Disp: , Rfl:     rOPINIRole (REQUIP) 0.5 MG tablet, Take 0.5 mg by mouth daily as needed., Disp: , Rfl:     rOPINIRole (REQUIP) 4 MG tablet, Take 4 mg by mouth., Disp: , Rfl:     traMADol (ULTRAM) 50 mg tablet, 2 (two) times daily as needed. Pain, Disp: , Rfl:     traMADol (ULTRAM) 50 mg tablet,  TRAMADOL, 0 Refill(s), Disp: , Rfl:     VITAMIN D2 50,000 unit capsule, Take 50,000 Units by mouth every 7 days., Disp: , Rfl: 3    Review of Systems:  Constitutional: no fever or chills  Eyes: no visual changes  ENT: no nasal congestion or sore throat  Respiratory: no cough or shortness of breath  Cardiovascular: no chest pain or palpitations  Gastrointestinal: no nausea or vomiting, tolerating diet  Genitourinary: no hematuria or dysuria  Integument/Breast: no rash or pruritis  Hematologic/Lymphatic: no easy bruising or lymphadenopathy  Musculoskeletal: positive for c/o left knee pain which is aching and worse with increased activity  Neurological: no seizures or tremors  Behavioral/Psych: no auditory or visual hallucinations  Endocrine: no heat or cold intolerance    PE:  Ht 5' 2" (1.575 m)   Wt 75.4 kg (166 lb 3.6 oz)   BMI 30.40 kg/m²   General: Pleasant, cooperative, NAD   Gait: antalgic  HEENT: NCAT, sclera nonicteric   Lungs: Respirations clear bilaterally; equal and unlabored.   CV: S1S2; 2+ bilateral upper and lower extremity pulses.   Skin: Intact throughout with no rashes, erythema, or lesions  Extremities: No LE edema,  no erythema or warmth of the skin in either lower extremity.    Left knee exam:  Knee Range of Motion:normal, pain with passive range of motion  Effusion:none  Condition of skin:intact  Location of tenderness:Medial joint line "   Strength:normal  Stability: stable to testing    Hip Examination:normal    Radiographs: Radiographs reveal there is moderate DJD.  No fracture dislocation bone destruction seen.     Knee Alignment:  Mild valgus    Diagnosis: osteoarthritis Left knee    Plan: Left total knee arthroplasty    Due to the serious nature of total joint infection and the high prevalence of community acquired MRSA, vancomycin will be used perioperatively.

## 2019-08-28 NOTE — ASSESSMENT & PLAN NOTE
Fibromyalgia   Helped by Cymbalta  I suggest monitoring the sodium as SIADH from Cymblata  use and hypersecretion of ADH associated with surgery can reduce sodium in the perioperative period

## 2019-08-28 NOTE — ASSESSMENT & PLAN NOTE
Elevated Indirect Bilirubin -   No suggestion of  hepatic decompensation   Not known to have liver disease      No hemolysis per history      ? Fasting lab related    INR not elevated     No history of cirrhosis of liver or suggestion hepatic decompensation

## 2019-08-28 NOTE — PROGRESS NOTES
Lora Goins is a 63 y.o. year old here today for a pre-operative visit in preparation for a Left total knee arthroplasty to be performed by  Dr. Elkins on 8/29/19.  she was last seen and treated in the clinic on 8/28/2019. she will be medically optimized by the pre op center. There has been no significant change in medical status since last visit. No fever, chills, malaise, or unexplained weight change.      Allergies, Medications, past medical and surgical history reviewed.    Focused examination performed.    Dr. Elkins saw this patient today in clinic. All questions answered. Patient encouraged to call with questions. Contact information given.     Pre, yeison, and post operative procedures and expectations discussed. Questions were answered. Lora Goins has been educated and is ready to proceed with surgery. Approximately 30 minutes was spent discussing surgical outcomes, plans, procedures pre, yeison, and post operative expections and care.  Surgical consent signed.    Lora Goins will contact us if there are any questions, concerns, or changes in medical status prior to surgery.

## 2019-08-28 NOTE — ASSESSMENT & PLAN NOTE
No recent problem   GERD-  I suggest continuation of the Proton pump inhibitor in the perioperative period . I suggest aspiration precautions  GERD  Symptoms -acid taste to the throat   Does not sound Cardiac

## 2019-08-28 NOTE — ASSESSMENT & PLAN NOTE
15 pack years   Quit in 2003     Not known to have COPD, Bronchitis, Emphysema, wheezing , chronic phlegm   No inhaler, oxygen use

## 2019-08-28 NOTE — PROGRESS NOTES
Subjective:      Patient ID: Lora Goins is a 63 y.o. female.    Chief Complaint: Pain of the Left Knee    HPI  Lora Goins is a 63 year old female here with a several year history of bilateral knee pain. Left worse than right, especially the last several months. The patient is a  [employer] employee. There was not a history of trauma.  The pain is severe The pain is located in the global aspect of the knee. There is not radiation.  There is not catching or locking.   The pain is described as achy. The patient has not had prior surgery. It is aggravated by standing and walking.  It is not alleviated by rest. There is not numbness or tingling of the lower extremity.  There is not back pain.  She  has tried medications and injections. They have helped in the past but not recently.  She does have difficulty getting in or out of a car, getting dressed, or going up or down stairs.  The patient does not use an assistive device.      Past Medical History:   Diagnosis Date    Charcot Kayla Tooth muscular atrophy     Fibromyalgia     Hypertension     Hypertension      Past Surgical History:   Procedure Laterality Date     SECTION      2     SECTION      FOOT SURGERY      REMOVAL OF PLANTAR WART USING LASER      TONSILLECTOMY       Family History   Problem Relation Age of Onset    Cancer Mother     Cancer Father     Cancer Sister      Social History     Socioeconomic History    Marital status: Single     Spouse name: Not on file    Number of children: Not on file    Years of education: Not on file    Highest education level: Not on file   Occupational History    Not on file   Social Needs    Financial resource strain: Not on file    Food insecurity:     Worry: Not on file     Inability: Not on file    Transportation needs:     Medical: Not on file     Non-medical: Not on file   Tobacco Use    Smoking status: Former Smoker     Last attempt to quit: 2003     Years since quittin.0     Smokeless tobacco: Never Used    Tobacco comment: 20 years ago   Substance and Sexual Activity    Alcohol use: Not Currently    Drug use: Not on file    Sexual activity: Not on file   Lifestyle    Physical activity:     Days per week: Not on file     Minutes per session: Not on file    Stress: Not on file   Relationships    Social connections:     Talks on phone: Not on file     Gets together: Not on file     Attends Confucianist service: Not on file     Active member of club or organization: Not on file     Attends meetings of clubs or organizations: Not on file     Relationship status: Not on file   Other Topics Concern    Not on file   Social History Narrative    Not on file     Current Outpatient Medications on File Prior to Visit   Medication Sig Dispense Refill    amLODIPine (NORVASC) 5 MG tablet Take 5 mg by mouth.      amLODIPine (NORVASC) 5 MG tablet Take 5 mg by mouth.      celecoxib (CELEBREX) 200 MG capsule Take 200 mg by mouth.      celecoxib (CELEBREX) 200 MG capsule       diclofenac sodium (VOLTAREN) 1 % Gel Apply 2 g topically.      DULoxetine (CYMBALTA) 30 MG capsule Take 30 mg by mouth.      DULoxetine (CYMBALTA) 30 MG capsule Take 30 mg by mouth.      esomeprazole (NEXIUM) 40 MG capsule Take by mouth.      ezetimibe (ZETIA) 10 mg tablet Take 10 mg by mouth.      gabapentin (NEURONTIN) 300 MG capsule Take 300 mg by mouth.      ibuprofen (ADVIL,MOTRIN) 200 MG tablet Take 200 mg by mouth as needed. Pain      LOVASTATIN ORAL   0 Refill(s)      rOPINIRole (REQUIP) 0.5 MG tablet Take 0.5 mg by mouth daily as needed.      rOPINIRole (REQUIP) 4 MG tablet Take 4 mg by mouth.      traMADol (ULTRAM) 50 mg tablet 2 (two) times daily as needed. Pain      traMADol (ULTRAM) 50 mg tablet   TRAMADOL, 0 Refill(s)      VITAMIN D2 50,000 unit capsule Take 50,000 Units by mouth every 7 days.  3     No current facility-administered medications on file prior to visit.      Review of patient's  "allergies indicates:   Allergen Reactions    Ezetimibe-simvastatin      Cramps    Lovastatin      cramps    Pitavastatin      cramps    Pravastatin        Review of Systems   Constitution: Negative for chills, fever and night sweats.   HENT: Negative for hearing loss.    Eyes: Negative for blurred vision and double vision.   Cardiovascular: Negative for chest pain, claudication and leg swelling.   Respiratory: Negative for shortness of breath.    Endocrine: Negative for polydipsia, polyphagia and polyuria.   Hematologic/Lymphatic: Negative for adenopathy and bleeding problem. Does not bruise/bleed easily.   Skin: Negative for poor wound healing.   Musculoskeletal: Positive for joint pain.   Gastrointestinal: Negative for diarrhea and heartburn.   Genitourinary: Negative for bladder incontinence.   Neurological: Negative for focal weakness, headaches, numbness, paresthesias and sensory change.   Psychiatric/Behavioral: The patient is not nervous/anxious.    Allergic/Immunologic: Negative for persistent infections.         Objective:      Body mass index is 30.4 kg/m².  Vitals:    08/28/19 0827   Weight: 75.4 kg (166 lb 3.6 oz)   Height: 5' 2" (1.575 m)         General    Constitutional: She is oriented to person, place, and time. She appears well-developed and well-nourished.   HENT:   Head: Normocephalic and atraumatic.   Eyes: EOM are normal.   Cardiovascular: Normal rate.    Pulmonary/Chest: Effort normal.   Neurological: She is alert and oriented to person, place, and time.   Psychiatric: She has a normal mood and affect. Her behavior is normal.     General Musculoskeletal Exam   Gait: abnormal and antalgic       Right Knee Exam     Inspection   Erythema: absent  Scars: absent  Swelling: absent  Effusion: absent  Deformity: absent  Bruising: absent    Tenderness   The patient is experiencing no tenderness.     Range of Motion   Extension: 0   Flexion: 130     Tests   Ligament Examination Lachman: normal (-1 " to 2mm)   MCL - Valgus: normal (0 to 2mm)  LCL - Varus: normal  Patella   Passive Patellar Tilt: neutral    Other   Sensation: normal    Left Knee Exam     Inspection   Erythema: absent  Scars: absent  Swelling: absent  Effusion: absent  Deformity: present (valgus-mild/correctable)  Bruising: absent    Tenderness   The patient tender to palpation of the medial joint line and lateral joint line.    Crepitus   The patient has crepitus of the patella.    Range of Motion   Extension: 0   Flexion: 130     Tests   Stability Lachman: normal (-1 to 2mm)   MCL - Valgus: normal (0 to 2mm)  LCL - Varus: normal (0 to 2mm)  Patella   Passive Patellar Tilt: neutral    Other   Sensation: normal    Muscle Strength   Right Lower Extremity   Hip Abduction: 5/5   Quadriceps:  5/5   Hamstrin/5   Left Lower Extremity   Hip Abduction: 5/5   Quadriceps:  5/5   Hamstrin/5     Reflexes     Left Side  Quadriceps:  2+    Right Side   Quadriceps:  2+    Vascular Exam     Right Pulses  Dorsalis Pedis:      2+          Left Pulses  Dorsalis Pedis:      2+          Edema  Right Lower Leg: absent  Left Lower Leg: absent    Radiographs taken previously and today and reviewed by me demonstrate moderately severe arthritic change of the left KNEE(s).There  is not bone destruction.  There is not a fracture. The lateral compartment is most involved.  There is a valgus deformity.  The changes are tricompartmental.              Assessment:       Encounter Diagnosis   Name Primary?    Primary osteoarthritis of left knee Yes          Plan:       Lora was seen today for pain.    Diagnoses and all orders for this visit:    Primary osteoarthritis of left knee      Treatment options were discussed. The surgical process of left knee replacement was discussed in detail with the patient including a detailed discussion of the procedure itself (including visual model, x-ray review, and literature review). The typical perioperative and post-operative  course was discussed and perioperative risks were discussed to the patient's satisfaction.  Risks and complications discussed included but were not limited to the risks of anesthetic complications, infection, bleeding, wound healing complications, stiffness, aseptic loosening, instability, limb length inequality, neurologic dysfunction including numbness and weakness, additional surgery,  DVT, pulmonary embolism, perioperative medical risks (cardiac, pulmonary, renal, neurologic), and death and the patient elects to proceed.  The patient should get medically cleared and attend the joint seminar.    ASA for DVT prophylaxis

## 2019-08-28 NOTE — ASSESSMENT & PLAN NOTE
Amlodipine   BP- 130/?   To her understanding doing good       Hypertension-  Blood pressure is acceptable . I suggest continuation of  Amlodipine   - during the entire perioperative period. I suggest blood pressure,monitoring .I suggest addressing pain control as uncontrolled pain can increased blood pressure

## 2019-08-28 NOTE — ASSESSMENT & PLAN NOTE
Tramadol use for 10 years for Neuropathy      Chronic continuous opioid use- In view of the opioid use, the patient may have opioid tolerance . I suggest considering the possibility of opioid tolerance  in planning post operative pain control     Not known to have seizures - Tramadol can lower seizure threshold

## 2019-08-28 NOTE — ANESTHESIA PREPROCEDURE EVALUATION
08/28/2019  Lora Goins is a 63 y.o., female.    Anesthesia Evaluation      I have reviewed the Medications.   Steroids Taken In Past Year:     Review of Systems  Anesthesia Hx:  History of prior surgery of interest to airway management or planning: Previous anesthesia: MAC 3 yrs ago: Colonoscopy with MAC.  Family Hx of Anesthesia complications: Family Anesthesia Complications are Mother: trouble waking up  Denies Personal Hx of Anesthesia complications.   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 Tobacco Use:  of cigarette, quit smoking >10 years ago Denies Alcohol Use.   EENT/Dental:   Denies Throat Symptoms  Jaw Problems:  Clicking (TMJ)   Cardiovascular:   Hypertension  Functional Capacity limited due to knee pain: denies CP/SOb  Denies Coronary Artery Disease.  Denies Deep Venous Thrombosis (DVT)  Hypertension    Pulmonary:  Denies Asthma.  Denies Chronic Obstructive Pulmonary Disease (COPD).  Possible Obstructive Sleep Apnea , (STOP/BANG) Symptoms S - Snoring (loud), P - Pressure being treated for high BP and A - Age > 50    Renal/:  Denies Kidney Function/Disease    Hepatic/GI:  Esophageal / Stomach Disorders Gerd Controlled by chronic antireflux medication.  Denies Liver Disease    Musculoskeletal:   Charcot-Kayla-Tooth Disease Muscle Disorders: Fibromyalgia  Joint Disease:  Arthritis, Osteoarthritis    Neurological:   Chronic continous use of opoids Neuro Symptoms of numbness bilateral footDenies Pain  Osteoarthritis  Peripheral Neuropathy  Denies Seizure Disorder  Denies CVA - Cerebrovasular Accident  Denies TIA - Transient Ischemic Attack  Movement Disorder Dx, Restless Leg Syndrome   Endocrine:  Denies Diabetes  Denies Thyroid Disease  Metabolic Disorders, Hyperlipoproteinemia      Physical Exam  General:  Obesity    Airway/Jaw/Neck:  Airway Findings: Mouth Opening: Normal Jaw/Neck  Findings:  Neck ROM: Normal ROM      Dental:  Dental Findings: Lower partial dentures        Mental Status:  Mental Status Findings:  Cooperative, Alert and Oriented         Anesthesia Plan  Type of Anesthesia, risks & benefits discussed:  Anesthesia Type:  general, CSE, spinal  Patient's Preference:   Intra-op Monitoring Plan:   Intra-op Monitoring Plan Comments:   Post Op Pain Control Plan:   Post Op Pain Control Plan Comments:   Induction:   IV  Beta Blocker:  Patient is not currently on a Beta-Blocker (No further documentation required).       Informed Consent: Patient understands risks and agrees with Anesthesia plan.  Questions answered. Anesthesia consent signed with patient.  ASA Score: 2     Day of Surgery Review of History & Physical:    H&P update referred to the surgeon.         Ready For Surgery From Anesthesia Perspective.     The patient was seen by Perioperative Internal Medicine physician Dr. Cervantes on 8/28/19 , please see recommendations.    Discharge Plan: To home with  (Dasilva: [phone])    Nicolasa Mckeon RN

## 2019-08-28 NOTE — OUTPATIENT SUBJECTIVE & OBJECTIVE
"Outpatient Subjective & Objective     Chief complaint-Preoperative evaluation, Perioperative Medical management, complication reduction plan     Active cardiac conditions- none    Revised cardiac risk index predictors- none    Functional capacity -Examples of physical activity, physical work,was riding the bicycle, treadmill until 6 months ago    house work and can take a flight of stairs holding on to the railing----- She can undertake all the above activities without  chest pain,chest tightness, Shortness of breath ,dizziness,lightheadedness making her exercise tolerance more, than 4 Mets.       Review of Systems   Constitutional: Negative for chills and fever.        No unusual weight changes     HENT:        STOPBANG score 3 / 8    Loud Snoring     HTN  Age over 50        Eyes:        No new visual changes   Respiratory:        No cough , phlegm    No Hemoptysis   Cardiovascular:        As noted   Gastrointestinal:        No overt GI/ blood losses  Bowel movements- Regular   Endocrine:        Prednisone use > 20 mg daily for 3 weeks-None   Genitourinary: Negative for dysuria.        No urinary hesitancy    Musculoskeletal:        As above   Not new   Left  hip bursitis - Steroid injection Mid May 2019  Rt knee pain -Steroid injection Mid May 2019     Skin: Negative for rash.   Neurological: Negative for syncope.        No unilateral weakness   Hematological:        Current use of Anticoagulants  None    Psychiatric/Behavioral:        No Depression,Anxiety     No vascular stenting     No past medical history pertinent negatives.        No anesthesia, bleeding, cardiac problems, PONV with previous surgeries/procedures.  Medications and Allergies reviewed in epic.   FH- No bleeding / venous thrombosis , early onset heart disease in family     Physical Exam  Blood pressure 136/80, pulse 79, temperature 98.2 °F (36.8 °C), height 5' 2" (1.575 m), weight 75.3 kg (166 lb), SpO2 98 %.    Physical Exam  Constitutional- " Vitals - Body mass index is 30.36 kg/m².,   Vitals:    08/28/19 0901   BP: 136/80   Pulse: 79   Temp: 98.2 °F (36.8 °C)     General appearance-Conscious,Coherent  Eyes- No conjunctival icterus,pupils  round  and reactive to light   ENT-Oral cavity- moist  , Hearing grossly normal   Neck- No thyromegaly ,Trachea -central, No jugular venous distension,   No Carotid Bruit   Cardiovascular -Heart Sounds- Normal  and  no murmur   , No gallop rhythm   Respiratory - ? cyst mid back - suggested follow up , Normal Respiratory Effort, Normal breath sounds,  no wheeze  and  no forced expiratory wheeze    Peripheral pitting pedal edema-- mild, no calf pain   Gastrointestinal -Soft abdomen, No palpable masses, Non Tender,Liver,Spleen not palpable. No-- free fluid and shifting dullness  Musculoskeletal- No finger Clubbing. Strength grossly normal   Lymphatic-No Palpable cervical, axillary,Inguinal lymphadenopathy   Psychiatric - normal effect,Orientation  Rt Dorsalis pedis pulses-palpable    Lt Dorsalis pedis pulses- palpable   Rt Posterior tibial pulses -palpable   Left posterior tibial pulses -palpable   Miscellaneous -  no asterixis,  no dupuytren's contracture,  Surgical scarlower abdomen   and  no renal bruit    Investigations  Lab and Imaging have been reviewed in epic.    2016 - No definite acute abdominal pathology. The liver and gallbladder are normal and there is no biliary duct dilation.  The spleen is normal  July 26 th 2019     Hb, HCT, PLT- N  BUN,ALB, Creat - N    Elevated Indirect Bilirubin -   No suggestion of  hepatic decompensation   Not known to have liver disease      No hemolysis     ? Fasting lab related      EKG 9/6/2018 - SR             Review of old records- Was done and information gathered regards to events leading to surgery and health conditions of significance in the perioperative period.    Outpatient Subjective & Objective

## 2019-08-28 NOTE — ASSESSMENT & PLAN NOTE
STOPBANG score 3 / 8      Possible sleep apnea- I suggest a sleep study and suggest caution with usage of medication that can cause respiratory suppression in the perioperative period    potential ramifications of untreated sleep apnea, which could include daytime sleepiness, hypertension, heart disease and stroke were discussed    Avoidance of  supine sleep, weight gain , alcoholic beverages , care with , sedative , CNS depressant use indicated  since all of these can worsen GOLDIE

## 2019-08-29 ENCOUNTER — HOSPITAL ENCOUNTER (INPATIENT)
Facility: HOSPITAL | Age: 64
LOS: 1 days | Discharge: HOME | DRG: 470 | End: 2019-08-30
Attending: ORTHOPAEDIC SURGERY | Admitting: ORTHOPAEDIC SURGERY
Payer: COMMERCIAL

## 2019-08-29 ENCOUNTER — ANESTHESIA (OUTPATIENT)
Dept: SURGERY | Facility: HOSPITAL | Age: 64
DRG: 470 | End: 2019-08-29
Payer: COMMERCIAL

## 2019-08-29 DIAGNOSIS — M17.12 PRIMARY OSTEOARTHRITIS OF LEFT KNEE: ICD-10-CM

## 2019-08-29 DIAGNOSIS — Z96.652 S/P TKR (TOTAL KNEE REPLACEMENT) USING CEMENT, LEFT: ICD-10-CM

## 2019-08-29 PROCEDURE — 63600175 PHARM REV CODE 636 W HCPCS: Performed by: NURSE PRACTITIONER

## 2019-08-29 PROCEDURE — 25000003 PHARM REV CODE 250: Performed by: NURSE PRACTITIONER

## 2019-08-29 PROCEDURE — 25000003 PHARM REV CODE 250: Performed by: STUDENT IN AN ORGANIZED HEALTH CARE EDUCATION/TRAINING PROGRAM

## 2019-08-29 PROCEDURE — 64448 NJX AA&/STRD FEM NRV NFS IMG: CPT | Mod: 59,LT,COE, | Performed by: ANESTHESIOLOGY

## 2019-08-29 PROCEDURE — 76942 ECHO GUIDE FOR BIOPSY: CPT | Mod: 26,COE,, | Performed by: ANESTHESIOLOGY

## 2019-08-29 PROCEDURE — 64448 NJX AA&/STRD FEM NRV NFS IMG: CPT | Performed by: STUDENT IN AN ORGANIZED HEALTH CARE EDUCATION/TRAINING PROGRAM

## 2019-08-29 PROCEDURE — 27447 TOTAL KNEE ARTHROPLASTY: CPT | Mod: LT,COE,, | Performed by: ORTHOPAEDIC SURGERY

## 2019-08-29 PROCEDURE — 97165 OT EVAL LOW COMPLEX 30 MIN: CPT

## 2019-08-29 PROCEDURE — 64448 ADDUCTOR CANAL CATHETER: ICD-10-PCS | Mod: 59,LT,COE, | Performed by: ANESTHESIOLOGY

## 2019-08-29 PROCEDURE — 36000710: Performed by: ORTHOPAEDIC SURGERY

## 2019-08-29 PROCEDURE — D9220A PRA ANESTHESIA: ICD-10-PCS | Mod: ANES,COE,, | Performed by: ANESTHESIOLOGY

## 2019-08-29 PROCEDURE — 63600175 PHARM REV CODE 636 W HCPCS: Performed by: ANESTHESIOLOGY

## 2019-08-29 PROCEDURE — 88305 TISSUE SPECIMEN TO PATHOLOGY: ICD-10-PCS | Mod: 26,COE,, | Performed by: PATHOLOGY

## 2019-08-29 PROCEDURE — 64450 NJX AA&/STRD OTHER PN/BRANCH: CPT | Mod: 59,LT,COE, | Performed by: ANESTHESIOLOGY

## 2019-08-29 PROCEDURE — 27201423 OPTIME MED/SURG SUP & DEVICES STERILE SUPPLY: Performed by: ORTHOPAEDIC SURGERY

## 2019-08-29 PROCEDURE — D9220A PRA ANESTHESIA: Mod: ANES,COE,, | Performed by: ANESTHESIOLOGY

## 2019-08-29 PROCEDURE — 27447 TOTAL KNEE ARTHROPLASTY: CPT | Mod: AS,LT,COE, | Performed by: PHYSICIAN ASSISTANT

## 2019-08-29 PROCEDURE — 97161 PT EVAL LOW COMPLEX 20 MIN: CPT

## 2019-08-29 PROCEDURE — 64450 NJX AA&/STRD OTHER PN/BRANCH: CPT | Performed by: STUDENT IN AN ORGANIZED HEALTH CARE EDUCATION/TRAINING PROGRAM

## 2019-08-29 PROCEDURE — 88311 DECALCIFY TISSUE: CPT | Mod: 26,COE,, | Performed by: PATHOLOGY

## 2019-08-29 PROCEDURE — 64450 IPACK SINGLE INJECTION BLOCK: ICD-10-PCS | Mod: 59,LT,COE, | Performed by: ANESTHESIOLOGY

## 2019-08-29 PROCEDURE — 94799 UNLISTED PULMONARY SVC/PX: CPT

## 2019-08-29 PROCEDURE — 25000003 PHARM REV CODE 250: Performed by: ANESTHESIOLOGY

## 2019-08-29 PROCEDURE — D9220A PRA ANESTHESIA: Mod: CRNA,COE,, | Performed by: NURSE ANESTHETIST, CERTIFIED REGISTERED

## 2019-08-29 PROCEDURE — 25000003 PHARM REV CODE 250: Performed by: NURSE ANESTHETIST, CERTIFIED REGISTERED

## 2019-08-29 PROCEDURE — 11000001 HC ACUTE MED/SURG PRIVATE ROOM

## 2019-08-29 PROCEDURE — 88305 TISSUE EXAM BY PATHOLOGIST: CPT | Mod: 26,COE,, | Performed by: PATHOLOGY

## 2019-08-29 PROCEDURE — 27800517 HC TRAY,EPIDURAL-CONTINUOUS: Performed by: STUDENT IN AN ORGANIZED HEALTH CARE EDUCATION/TRAINING PROGRAM

## 2019-08-29 PROCEDURE — 88305 TISSUE EXAM BY PATHOLOGIST: CPT | Performed by: PATHOLOGY

## 2019-08-29 PROCEDURE — 63600175 PHARM REV CODE 636 W HCPCS: Performed by: NURSE ANESTHETIST, CERTIFIED REGISTERED

## 2019-08-29 PROCEDURE — 94761 N-INVAS EAR/PLS OXIMETRY MLT: CPT

## 2019-08-29 PROCEDURE — 27447 PR TOTAL KNEE ARTHROPLASTY: ICD-10-PCS | Mod: AS,LT,COE, | Performed by: PHYSICIAN ASSISTANT

## 2019-08-29 PROCEDURE — 71000033 HC RECOVERY, INTIAL HOUR: Performed by: ORTHOPAEDIC SURGERY

## 2019-08-29 PROCEDURE — 97530 THERAPEUTIC ACTIVITIES: CPT

## 2019-08-29 PROCEDURE — 37000008 HC ANESTHESIA 1ST 15 MINUTES: Performed by: ORTHOPAEDIC SURGERY

## 2019-08-29 PROCEDURE — 76942 IPACK SINGLE INJECTION BLOCK: ICD-10-PCS | Mod: 26,COE,, | Performed by: ANESTHESIOLOGY

## 2019-08-29 PROCEDURE — C1776 JOINT DEVICE (IMPLANTABLE): HCPCS | Performed by: ORTHOPAEDIC SURGERY

## 2019-08-29 PROCEDURE — 97116 GAIT TRAINING THERAPY: CPT

## 2019-08-29 PROCEDURE — D9220A PRA ANESTHESIA: ICD-10-PCS | Mod: CRNA,COE,, | Performed by: NURSE ANESTHETIST, CERTIFIED REGISTERED

## 2019-08-29 PROCEDURE — 76942 ECHO GUIDE FOR BIOPSY: CPT | Performed by: STUDENT IN AN ORGANIZED HEALTH CARE EDUCATION/TRAINING PROGRAM

## 2019-08-29 PROCEDURE — 27447 PR TOTAL KNEE ARTHROPLASTY: ICD-10-PCS | Mod: LT,COE,, | Performed by: ORTHOPAEDIC SURGERY

## 2019-08-29 PROCEDURE — 88311 TISSUE SPECIMEN TO PATHOLOGY: ICD-10-PCS | Mod: 26,COE,, | Performed by: PATHOLOGY

## 2019-08-29 PROCEDURE — 37000009 HC ANESTHESIA EA ADD 15 MINS: Performed by: ORTHOPAEDIC SURGERY

## 2019-08-29 PROCEDURE — 36000711: Performed by: ORTHOPAEDIC SURGERY

## 2019-08-29 DEVICE — COMP FEM POST STAB CEM SZ 3 LF: Type: IMPLANTABLE DEVICE | Site: KNEE | Status: FUNCTIONAL

## 2019-08-29 DEVICE — PATELLA TRIATHLON 29X8 SYMTRC: Type: IMPLANTABLE DEVICE | Site: KNEE | Status: FUNCTIONAL

## 2019-08-29 DEVICE — INSERT TIBIAL TRIATHLON PS SZ3: Type: IMPLANTABLE DEVICE | Site: KNEE | Status: FUNCTIONAL

## 2019-08-29 DEVICE — BASEPLATE TIB CEM PRIM SZ 3: Type: IMPLANTABLE DEVICE | Site: KNEE | Status: FUNCTIONAL

## 2019-08-29 RX ORDER — MUPIROCIN 20 MG/G
1 OINTMENT TOPICAL 2 TIMES DAILY
Status: DISCONTINUED | OUTPATIENT
Start: 2019-08-29 | End: 2019-08-30 | Stop reason: HOSPADM

## 2019-08-29 RX ORDER — SODIUM CHLORIDE 9 MG/ML
INJECTION, SOLUTION INTRAVENOUS CONTINUOUS
Status: ACTIVE | OUTPATIENT
Start: 2019-08-29 | End: 2019-08-30

## 2019-08-29 RX ORDER — BISACODYL 10 MG
10 SUPPOSITORY, RECTAL RECTAL EVERY 12 HOURS PRN
Status: DISCONTINUED | OUTPATIENT
Start: 2019-08-29 | End: 2019-08-30 | Stop reason: HOSPADM

## 2019-08-29 RX ORDER — SODIUM CHLORIDE 0.9 % (FLUSH) 0.9 %
10 SYRINGE (ML) INJECTION
Status: DISCONTINUED | OUTPATIENT
Start: 2019-08-29 | End: 2019-08-29 | Stop reason: HOSPADM

## 2019-08-29 RX ORDER — FENTANYL CITRATE 50 UG/ML
25 INJECTION, SOLUTION INTRAMUSCULAR; INTRAVENOUS EVERY 5 MIN PRN
Status: DISCONTINUED | OUTPATIENT
Start: 2019-08-29 | End: 2019-08-29 | Stop reason: HOSPADM

## 2019-08-29 RX ORDER — OXYCODONE AND ACETAMINOPHEN 5; 325 MG/1; MG/1
1 TABLET ORAL
Qty: 50 TABLET | Refills: 0 | Status: SHIPPED | OUTPATIENT
Start: 2019-08-29 | End: 2019-09-04 | Stop reason: SDUPTHER

## 2019-08-29 RX ORDER — ASPIRIN 81 MG/1
81 TABLET ORAL 2 TIMES DAILY
Qty: 60 TABLET | Refills: 0 | Status: SHIPPED | OUTPATIENT
Start: 2019-08-29 | End: 2019-12-18 | Stop reason: ALTCHOICE

## 2019-08-29 RX ORDER — MORPHINE SULFATE 2 MG/ML
2 INJECTION, SOLUTION INTRAMUSCULAR; INTRAVENOUS
Status: DISCONTINUED | OUTPATIENT
Start: 2019-08-29 | End: 2019-08-30 | Stop reason: HOSPADM

## 2019-08-29 RX ORDER — ONDANSETRON 2 MG/ML
4 INJECTION INTRAMUSCULAR; INTRAVENOUS EVERY 8 HOURS PRN
Status: DISCONTINUED | OUTPATIENT
Start: 2019-08-29 | End: 2019-08-30 | Stop reason: HOSPADM

## 2019-08-29 RX ORDER — BUPIVACAINE HYDROCHLORIDE AND EPINEPHRINE 2.5; 5 MG/ML; UG/ML
INJECTION, SOLUTION EPIDURAL; INFILTRATION; INTRACAUDAL; PERINEURAL
Status: COMPLETED | OUTPATIENT
Start: 2019-08-29 | End: 2019-08-29

## 2019-08-29 RX ORDER — OXYCODONE HYDROCHLORIDE 5 MG/1
5 TABLET ORAL
Status: DISCONTINUED | OUTPATIENT
Start: 2019-08-29 | End: 2019-08-29 | Stop reason: HOSPADM

## 2019-08-29 RX ORDER — MUPIROCIN 20 MG/G
1 OINTMENT TOPICAL
Status: COMPLETED | OUTPATIENT
Start: 2019-08-29 | End: 2019-08-29

## 2019-08-29 RX ORDER — CELECOXIB 200 MG/1
200 CAPSULE ORAL DAILY
Status: DISCONTINUED | OUTPATIENT
Start: 2019-08-30 | End: 2019-08-30 | Stop reason: HOSPADM

## 2019-08-29 RX ORDER — PROPOFOL 10 MG/ML
VIAL (ML) INTRAVENOUS CONTINUOUS PRN
Status: DISCONTINUED | OUTPATIENT
Start: 2019-08-29 | End: 2019-08-29

## 2019-08-29 RX ORDER — FAMOTIDINE 20 MG/1
20 TABLET, FILM COATED ORAL 2 TIMES DAILY
Status: DISCONTINUED | OUTPATIENT
Start: 2019-08-29 | End: 2019-08-30 | Stop reason: HOSPADM

## 2019-08-29 RX ORDER — PREGABALIN 75 MG/1
75 CAPSULE ORAL
Status: COMPLETED | OUTPATIENT
Start: 2019-08-29 | End: 2019-08-29

## 2019-08-29 RX ORDER — SODIUM CHLORIDE 9 MG/ML
INJECTION, SOLUTION INTRAVENOUS CONTINUOUS PRN
Status: DISCONTINUED | OUTPATIENT
Start: 2019-08-29 | End: 2019-08-29

## 2019-08-29 RX ORDER — ROPINIROLE 1 MG/1
4 TABLET, FILM COATED ORAL NIGHTLY
Status: DISCONTINUED | OUTPATIENT
Start: 2019-08-29 | End: 2019-08-30 | Stop reason: HOSPADM

## 2019-08-29 RX ORDER — DOCUSATE SODIUM 100 MG/1
100 CAPSULE, LIQUID FILLED ORAL 2 TIMES DAILY PRN
Qty: 60 CAPSULE | Refills: 0 | Status: SHIPPED | OUTPATIENT
Start: 2019-08-29 | End: 2019-12-18 | Stop reason: ALTCHOICE

## 2019-08-29 RX ORDER — PREGABALIN 50 MG/1
150 CAPSULE ORAL NIGHTLY
Status: DISCONTINUED | OUTPATIENT
Start: 2019-08-29 | End: 2019-08-30 | Stop reason: HOSPADM

## 2019-08-29 RX ORDER — PHENYLEPHRINE HYDROCHLORIDE 10 MG/ML
INJECTION INTRAVENOUS
Status: DISCONTINUED | OUTPATIENT
Start: 2019-08-29 | End: 2019-08-29

## 2019-08-29 RX ORDER — ONDANSETRON 2 MG/ML
INJECTION INTRAMUSCULAR; INTRAVENOUS
Status: DISCONTINUED | OUTPATIENT
Start: 2019-08-29 | End: 2019-08-29

## 2019-08-29 RX ORDER — MIDAZOLAM HYDROCHLORIDE 1 MG/ML
1 INJECTION INTRAMUSCULAR; INTRAVENOUS EVERY 5 MIN PRN
Status: DISCONTINUED | OUTPATIENT
Start: 2019-08-29 | End: 2019-08-29 | Stop reason: HOSPADM

## 2019-08-29 RX ORDER — OXYCODONE HYDROCHLORIDE 10 MG/1
10 TABLET ORAL
Status: DISCONTINUED | OUTPATIENT
Start: 2019-08-29 | End: 2019-08-30 | Stop reason: HOSPADM

## 2019-08-29 RX ORDER — CEFAZOLIN SODIUM 1 G/3ML
2 INJECTION, POWDER, FOR SOLUTION INTRAMUSCULAR; INTRAVENOUS
Status: COMPLETED | OUTPATIENT
Start: 2019-08-29 | End: 2019-08-29

## 2019-08-29 RX ORDER — DEXAMETHASONE SODIUM PHOSPHATE 4 MG/ML
INJECTION, SOLUTION INTRA-ARTICULAR; INTRALESIONAL; INTRAMUSCULAR; INTRAVENOUS; SOFT TISSUE
Status: DISCONTINUED | OUTPATIENT
Start: 2019-08-29 | End: 2019-08-29

## 2019-08-29 RX ORDER — MORPHINE SULFATE 2 MG/ML
2 INJECTION, SOLUTION INTRAMUSCULAR; INTRAVENOUS
Status: DISCONTINUED | OUTPATIENT
Start: 2019-08-29 | End: 2019-08-29

## 2019-08-29 RX ORDER — MIDAZOLAM HYDROCHLORIDE 1 MG/ML
INJECTION, SOLUTION INTRAMUSCULAR; INTRAVENOUS
Status: DISCONTINUED | OUTPATIENT
Start: 2019-08-29 | End: 2019-08-29

## 2019-08-29 RX ORDER — DULOXETIN HYDROCHLORIDE 30 MG/1
30 CAPSULE, DELAYED RELEASE ORAL DAILY
Status: DISCONTINUED | OUTPATIENT
Start: 2019-08-30 | End: 2019-08-30 | Stop reason: HOSPADM

## 2019-08-29 RX ORDER — ONDANSETRON 8 MG/1
8 TABLET, ORALLY DISINTEGRATING ORAL EVERY 12 HOURS PRN
Qty: 20 TABLET | Refills: 0 | Status: SHIPPED | OUTPATIENT
Start: 2019-08-29 | End: 2019-12-18 | Stop reason: ALTCHOICE

## 2019-08-29 RX ORDER — KETAMINE HCL IN 0.9 % NACL 50 MG/5 ML
SYRINGE (ML) INTRAVENOUS
Status: DISCONTINUED | OUTPATIENT
Start: 2019-08-29 | End: 2019-08-29

## 2019-08-29 RX ORDER — POLYETHYLENE GLYCOL 3350 17 G/17G
17 POWDER, FOR SOLUTION ORAL DAILY
Status: DISCONTINUED | OUTPATIENT
Start: 2019-08-30 | End: 2019-08-30 | Stop reason: HOSPADM

## 2019-08-29 RX ORDER — VANCOMYCIN HCL IN 5 % DEXTROSE 1G/250ML
1000 PLASTIC BAG, INJECTION (ML) INTRAVENOUS
Status: COMPLETED | OUTPATIENT
Start: 2019-08-29 | End: 2019-08-29

## 2019-08-29 RX ORDER — ACETAMINOPHEN 500 MG
1000 TABLET ORAL EVERY 6 HOURS
Status: DISCONTINUED | OUTPATIENT
Start: 2019-08-29 | End: 2019-08-30 | Stop reason: HOSPADM

## 2019-08-29 RX ORDER — ROPINIROLE 0.25 MG/1
0.5 TABLET, FILM COATED ORAL NIGHTLY
Status: DISCONTINUED | OUTPATIENT
Start: 2019-08-29 | End: 2019-08-30 | Stop reason: HOSPADM

## 2019-08-29 RX ORDER — ACETAMINOPHEN 10 MG/ML
1000 INJECTION, SOLUTION INTRAVENOUS ONCE
Status: COMPLETED | OUTPATIENT
Start: 2019-08-29 | End: 2019-08-29

## 2019-08-29 RX ORDER — RAMELTEON 8 MG/1
8 TABLET ORAL NIGHTLY PRN
Status: DISCONTINUED | OUTPATIENT
Start: 2019-08-29 | End: 2019-08-30 | Stop reason: HOSPADM

## 2019-08-29 RX ORDER — OXYCODONE HYDROCHLORIDE 5 MG/1
5 TABLET ORAL
Status: DISCONTINUED | OUTPATIENT
Start: 2019-08-29 | End: 2019-08-30 | Stop reason: HOSPADM

## 2019-08-29 RX ORDER — OXYCODONE HYDROCHLORIDE 5 MG/1
5 TABLET ORAL
Status: DISCONTINUED | OUTPATIENT
Start: 2019-08-29 | End: 2019-08-29

## 2019-08-29 RX ORDER — PREGABALIN 75 MG/1
75 CAPSULE ORAL NIGHTLY
Status: DISCONTINUED | OUTPATIENT
Start: 2019-08-29 | End: 2019-08-29

## 2019-08-29 RX ORDER — PROPOFOL 10 MG/ML
VIAL (ML) INTRAVENOUS
Status: DISCONTINUED | OUTPATIENT
Start: 2019-08-29 | End: 2019-08-29

## 2019-08-29 RX ORDER — CELECOXIB 200 MG/1
400 CAPSULE ORAL
Status: COMPLETED | OUTPATIENT
Start: 2019-08-29 | End: 2019-08-29

## 2019-08-29 RX ORDER — AMLODIPINE BESYLATE 5 MG/1
5 TABLET ORAL DAILY
Status: DISCONTINUED | OUTPATIENT
Start: 2019-08-30 | End: 2019-08-30 | Stop reason: HOSPADM

## 2019-08-29 RX ORDER — SODIUM CHLORIDE 9 MG/ML
INJECTION, SOLUTION INTRAVENOUS
Status: COMPLETED | OUTPATIENT
Start: 2019-08-29 | End: 2019-08-29

## 2019-08-29 RX ORDER — EZETIMIBE 10 MG/1
10 TABLET ORAL DAILY
Status: DISCONTINUED | OUTPATIENT
Start: 2019-08-30 | End: 2019-08-30 | Stop reason: HOSPADM

## 2019-08-29 RX ORDER — LIDOCAINE HYDROCHLORIDE 10 MG/ML
1 INJECTION, SOLUTION EPIDURAL; INFILTRATION; INTRACAUDAL; PERINEURAL ONCE
Status: CANCELLED | OUTPATIENT
Start: 2019-08-29 | End: 2019-08-29

## 2019-08-29 RX ORDER — NALOXONE HCL 0.4 MG/ML
0.02 VIAL (ML) INJECTION
Status: DISCONTINUED | OUTPATIENT
Start: 2019-08-29 | End: 2019-08-30 | Stop reason: HOSPADM

## 2019-08-29 RX ORDER — ASPIRIN 81 MG/1
81 TABLET ORAL 2 TIMES DAILY
Status: DISCONTINUED | OUTPATIENT
Start: 2019-08-29 | End: 2019-08-30 | Stop reason: HOSPADM

## 2019-08-29 RX ORDER — OXYCODONE HYDROCHLORIDE 10 MG/1
10 TABLET ORAL
Status: DISCONTINUED | OUTPATIENT
Start: 2019-08-29 | End: 2019-08-29

## 2019-08-29 RX ORDER — ROPIVACAINE HYDROCHLORIDE 2 MG/ML
8 INJECTION, SOLUTION EPIDURAL; INFILTRATION; PERINEURAL CONTINUOUS
Status: DISCONTINUED | OUTPATIENT
Start: 2019-08-29 | End: 2019-08-30 | Stop reason: HOSPADM

## 2019-08-29 RX ORDER — AMOXICILLIN 250 MG
1 CAPSULE ORAL 2 TIMES DAILY
Status: DISCONTINUED | OUTPATIENT
Start: 2019-08-29 | End: 2019-08-30 | Stop reason: HOSPADM

## 2019-08-29 RX ORDER — LIDOCAINE HYDROCHLORIDE 10 MG/ML
1 INJECTION, SOLUTION EPIDURAL; INFILTRATION; INTRACAUDAL; PERINEURAL
Status: COMPLETED | OUTPATIENT
Start: 2019-08-29 | End: 2019-08-29

## 2019-08-29 RX ADMIN — MUPIROCIN 1 G: 20 OINTMENT TOPICAL at 05:08

## 2019-08-29 RX ADMIN — CEFAZOLIN 2 G: 1 INJECTION, POWDER, FOR SOLUTION INTRAMUSCULAR; INTRAVENOUS at 11:08

## 2019-08-29 RX ADMIN — OXYCODONE HYDROCHLORIDE 5 MG: 5 TABLET ORAL at 12:08

## 2019-08-29 RX ADMIN — RAMELTEON 8 MG: 8 TABLET ORAL at 08:08

## 2019-08-29 RX ADMIN — Medication 20 MG: at 07:08

## 2019-08-29 RX ADMIN — MIDAZOLAM HYDROCHLORIDE 2 MG: 1 INJECTION, SOLUTION INTRAMUSCULAR; INTRAVENOUS at 06:08

## 2019-08-29 RX ADMIN — ASPIRIN 81 MG: 81 TABLET, COATED ORAL at 09:08

## 2019-08-29 RX ADMIN — SENNOSIDES,DOCUSATE SODIUM 1 TABLET: 8.6; 5 TABLET, FILM COATED ORAL at 08:08

## 2019-08-29 RX ADMIN — ACETAMINOPHEN 1000 MG: 10 INJECTION, SOLUTION INTRAVENOUS at 09:08

## 2019-08-29 RX ADMIN — DEXAMETHASONE SODIUM PHOSPHATE 8 MG: 4 INJECTION, SOLUTION INTRAMUSCULAR; INTRAVENOUS at 07:08

## 2019-08-29 RX ADMIN — OXYCODONE HYDROCHLORIDE 5 MG: 5 TABLET ORAL at 03:08

## 2019-08-29 RX ADMIN — CELECOXIB 400 MG: 200 CAPSULE ORAL at 05:08

## 2019-08-29 RX ADMIN — OXYCODONE HYDROCHLORIDE 10 MG: 5 TABLET ORAL at 09:08

## 2019-08-29 RX ADMIN — BUPIVACAINE HYDROCHLORIDE AND EPINEPHRINE BITARTRATE 20 ML: 2.5; .0091 INJECTION, SOLUTION EPIDURAL; INFILTRATION; INTRACAUDAL; PERINEURAL at 06:08

## 2019-08-29 RX ADMIN — CEFAZOLIN 2 G: 1 INJECTION, POWDER, FOR SOLUTION INTRAMUSCULAR; INTRAVENOUS at 03:08

## 2019-08-29 RX ADMIN — ONDANSETRON 4 MG: 2 INJECTION INTRAMUSCULAR; INTRAVENOUS at 08:08

## 2019-08-29 RX ADMIN — FENTANYL CITRATE 50 MCG: 50 INJECTION INTRAMUSCULAR; INTRAVENOUS at 06:08

## 2019-08-29 RX ADMIN — SODIUM CHLORIDE: 0.9 INJECTION, SOLUTION INTRAVENOUS at 09:08

## 2019-08-29 RX ADMIN — CEFAZOLIN 2 G: 330 INJECTION, POWDER, FOR SOLUTION INTRAMUSCULAR; INTRAVENOUS at 07:08

## 2019-08-29 RX ADMIN — MUPIROCIN 1 G: 20 OINTMENT TOPICAL at 09:08

## 2019-08-29 RX ADMIN — SODIUM CHLORIDE: 0.9 INJECTION, SOLUTION INTRAVENOUS at 06:08

## 2019-08-29 RX ADMIN — PROPOFOL 20 MG: 10 INJECTION, EMULSION INTRAVENOUS at 07:08

## 2019-08-29 RX ADMIN — SENNOSIDES,DOCUSATE SODIUM 1 TABLET: 8.6; 5 TABLET, FILM COATED ORAL at 09:08

## 2019-08-29 RX ADMIN — FAMOTIDINE 20 MG: 20 TABLET, FILM COATED ORAL at 09:08

## 2019-08-29 RX ADMIN — SODIUM CHLORIDE, SODIUM GLUCONATE, SODIUM ACETATE, POTASSIUM CHLORIDE, MAGNESIUM CHLORIDE, SODIUM PHOSPHATE, DIBASIC, AND POTASSIUM PHOSPHATE: .53; .5; .37; .037; .03; .012; .00082 INJECTION, SOLUTION INTRAVENOUS at 07:08

## 2019-08-29 RX ADMIN — SODIUM CHLORIDE: 0.9 INJECTION, SOLUTION INTRAVENOUS at 05:08

## 2019-08-29 RX ADMIN — MIDAZOLAM HYDROCHLORIDE 2 MG: 1 INJECTION, SOLUTION INTRAMUSCULAR; INTRAVENOUS at 07:08

## 2019-08-29 RX ADMIN — PREGABALIN 150 MG: 50 CAPSULE ORAL at 08:08

## 2019-08-29 RX ADMIN — ROPIVACAINE HYDROCHLORIDE 8 ML/HR: 2 INJECTION, SOLUTION EPIDURAL; INFILTRATION at 09:08

## 2019-08-29 RX ADMIN — ACETAMINOPHEN 1000 MG: 500 TABLET ORAL at 11:08

## 2019-08-29 RX ADMIN — FAMOTIDINE 20 MG: 20 TABLET, FILM COATED ORAL at 08:08

## 2019-08-29 RX ADMIN — ROPINIROLE HYDROCHLORIDE 0.5 MG: 0.25 TABLET, FILM COATED ORAL at 08:08

## 2019-08-29 RX ADMIN — PROPOFOL 75 MCG/KG/MIN: 10 INJECTION, EMULSION INTRAVENOUS at 07:08

## 2019-08-29 RX ADMIN — ACETAMINOPHEN 1000 MG: 500 TABLET ORAL at 05:08

## 2019-08-29 RX ADMIN — PHENYLEPHRINE HYDROCHLORIDE 100 MCG: 10 INJECTION INTRAVENOUS at 08:08

## 2019-08-29 RX ADMIN — LIDOCAINE HYDROCHLORIDE 0.1 MG: 10 INJECTION, SOLUTION EPIDURAL; INFILTRATION; INTRACAUDAL; PERINEURAL at 05:08

## 2019-08-29 RX ADMIN — ROPINIROLE HYDROCHLORIDE 4 MG: 1 TABLET, FILM COATED ORAL at 08:08

## 2019-08-29 RX ADMIN — OXYCODONE HYDROCHLORIDE 10 MG: 10 TABLET ORAL at 05:08

## 2019-08-29 RX ADMIN — ROPIVACAINE HYDROCHLORIDE 8 ML/HR: 2 INJECTION, SOLUTION EPIDURAL; INFILTRATION at 07:08

## 2019-08-29 RX ADMIN — PREGABALIN 75 MG: 75 CAPSULE ORAL at 05:08

## 2019-08-29 RX ADMIN — MEPIVACAINE HYDROCHLORIDE 3 ML: 15 INJECTION, SOLUTION EPIDURAL; INFILTRATION at 07:08

## 2019-08-29 RX ADMIN — OXYCODONE HYDROCHLORIDE 10 MG: 10 TABLET ORAL at 09:08

## 2019-08-29 RX ADMIN — ASPIRIN 81 MG: 81 TABLET, COATED ORAL at 08:08

## 2019-08-29 RX ADMIN — VANCOMYCIN HYDROCHLORIDE 1000 MG: 1 INJECTION, POWDER, LYOPHILIZED, FOR SOLUTION INTRAVENOUS at 05:08

## 2019-08-29 NOTE — ANESTHESIA PROCEDURE NOTES
Adductor Canal Catheter    Patient location during procedure: pre-op   Block not for primary anesthetic.  Reason for block: at surgeon's request and post-op pain management   Post-op Pain Location: L knee pain  Start time: 8/29/2019 6:20 AM  Timeout: 8/29/2019 6:20 AM   End time: 8/29/2019 6:28 AM    Staffing  Authorizing Provider: Sanjeev Conte MD  Performing Provider: Hakeem Calderon MD    Preanesthetic Checklist  Completed: patient identified, site marked, surgical consent, pre-op evaluation, timeout performed, IV checked, risks and benefits discussed and monitors and equipment checked  Peripheral Block  Patient position: supine  Prep: ChloraPrep and site prepped and draped  Patient monitoring: heart rate, cardiac monitor, continuous pulse ox, continuous capnometry and frequent blood pressure checks  Block type: adductor canal  Laterality: left  Injection technique: continuous  Needle  Needle type: Tuohy   Needle gauge: 17 G  Needle length: 3.5 in  Needle localization: anatomical landmarks and ultrasound guidance  Catheter type: spring wound  Catheter size: 19 G  Test dose: lidocaine 1.5% with Epi 1-to-200,000 and negative   -ultrasound image captured on disc.  Assessment  Injection assessment: negative aspiration, negative parasthesia and local visualized surrounding nerve  Paresthesia pain: none  Heart rate change: no  Slow fractionated injection: yes  Additional Notes  VSS.  [facility] RN monitoring vitals throughout procedure.  Patient tolerated procedure well.

## 2019-08-29 NOTE — ANESTHESIA PROCEDURE NOTES
Spinal    Diagnosis: knee pain   Patient location during procedure: OR  Start time: 8/29/2019 7:09 AM  Timeout: 8/29/2019 7:06 AM  End time: 8/29/2019 7:12 AM    Staffing  Authorizing Provider: Alexandria Desir MD  Performing Provider: Jasmyn Valencia CRNA    Preanesthetic Checklist  Completed: patient identified, site marked, surgical consent, pre-op evaluation, timeout performed, IV checked, risks and benefits discussed and monitors and equipment checked  Spinal Block  Patient position: sitting  Prep: ChloraPrep  Patient monitoring: heart rate, cardiac monitor, continuous capnometry, frequent blood pressure checks and continuous pulse ox  Approach: midline  Location: L3-4  Injection technique: single shot  CSF Fluid: clear free-flowing CSF  Needle  Needle type: Dangelo   Needle gauge: 25 G  Needle length: 3.5 in  Additional Documentation: no paresthesia on injection  Needle localization: anatomical landmarks  Assessment  Sensory level: T9   Dermatomal levels determined by pinch or prick  Ease of block: easy  Patient's tolerance of the procedure: comfortable throughout block and no complaints

## 2019-08-29 NOTE — OPERATIVE NOTE ADDENDUM
Certification of Assistant at Surgery       Surgery Date: 8/29/2019     Participating Surgeons:  Surgeon(s) and Role:     * Chapito Elkins MD - Primary    Procedures:  Procedure(s) (LRB):  ARTHROPLASTY, KNEE, TOTAL-[facility] (Left)    Assistant Surgeon's Certification of Necessity:  I understand that section 1842 (b) (6) (d) of the Social Security Act generally prohibits Medicare Part B reasonable charge payment for the services of assistants at surgery in teaching hospitals when qualified residents are available to furnish such services. I certify that the services for which payment is claimed were medically necessary, and that no qualified resident was available to perform the services. I further understand that these services are subject to post-payment review by the Medicare carrier.      Caryn Gardiner PA-C    08/29/2019  8:59 AM

## 2019-08-29 NOTE — PLAN OF CARE
Problem: Physical Therapy Goal  Goal: Physical Therapy Goal  Goals to be met by: 19    Patient will increase functional independence with mobility by performin. Supine to sit with supervision  2. Sit to supine with supervision  3. Sit to stand transfer with Supervision  4. Gait x200 feet with Supervision using Rolling Walker  5. Ascend/Descend 1 curb step with Supervision using Rolling Walker  6. Lower extremity exercise program x30 reps per handout, with supervision         Patient tolerated PT evaluation well today. Patient ambulated 10 steps forward then backward with SW and CGA. She would continue to benefit from PT in order to get back to PLOF.

## 2019-08-29 NOTE — PT/OT/SLP EVAL
Physical Therapy Evaluation and Treatment     Patient Name:  Lora Goins   MRN:  [id number]    Recommendations:     Discharge Recommendations:  outpatient PT   Discharge Equipment Recommendations: none   Barriers to discharge: None    Assessment:     Lora Goins is a 63 y.o. female admitted with a medical diagnosis of L TKA.  She presents with the following impairments/functional limitations:  weakness, impaired endurance, impaired functional mobilty, gait instability, impaired balance, decreased lower extremity function, decreased ROM, orthopedic precautions, impaired skin, pain, impaired joint extensibility. Patient tolerated PT evaluation well today. Patient ambulated 10 steps forward then backward with SW and CGA. She would continue to benefit from PT in order to get back to PLOF.     Rehab Prognosis: Good; patient would benefit from acute skilled PT services to address these deficits and reach maximum level of function.    Recent Surgery: Procedure(s) (LRB):  ARTHROPLASTY, KNEE, TOTAL-[facility] (Left) Day of Surgery    Plan:     During this hospitalization, patient to be seen daily to address the identified rehab impairments via gait training, therapeutic activities, therapeutic exercises and progress toward the following goals:    · Plan of Care Expires:  09/05/19    Subjective     Chief Complaint: Pain in left knee.   Patient/Family Comments/goals: To walk without pain.   Pain/Comfort:  · Pain Rating 1: 2/10  · Location - Side 1: Left  · Location 1: knee  · Pain Addressed 1: Reposition, Distraction, Cessation of Activity    Living Environment:  Patient lives with  in a H with no steps to enter. Prior to admission, patients level of function was independent for functional mobility. Equipment used at home: walker, rolling, shower chair(patient does not want a BSC). Upon discharge, patient will have assistance from .    Objective:     Communicated with RN prior to session.  Patient found  supine with blood pressure cuff, cryotherapy, FCD, perineural catheter, pulse ox (continuous), telemetry, peripheral IV  upon PT entry to room.    General Precautions: Standard, fall   Orthopedic Precautions:LLE weight bearing as tolerated   Braces: N/A     Exams:  · Cognitive Exam:  Patient is oriented to Person, Place, Time and Situation  · Sensation:    · -       Intact  · RLE ROM: WFL  · RLE Strength: WFL  · LLE ROM: WFL except limited by surgical pain and bulky dressing  · LLE Strength: limited by surgical pain    Functional Mobility:  · Bed Mobility:     · Supine to Sit: contact guard assistance  · Sit to Supine: contact guard assistance  · Transfers:     · Sit to Stand:  contact guard assistance with standard walker  · Gait:  Patient ambulated 10 steps forward then backward with SW and CGA. No LOB or SOB noted.       Therapeutic Activities and Exercises:   Patient educated in:  -PT role and POC  -safety with transfers including hand placement  -gait sequencing and SW management  -OOB activity to maximize recovery       AM-PAC 6 CLICK MOBILITY  Total Score:17     Patient left supine with all lines intact and RN notified.    GOALS:   Multidisciplinary Problems     Physical Therapy Goals        Problem: Physical Therapy Goal    Goal Priority Disciplines Outcome Goal Variances Interventions   Physical Therapy Goal     PT, PT/OT      Description:  Goals to be met by: 19    Patient will increase functional independence with mobility by performin. Supine to sit with supervision  2. Sit to supine with supervision  3. Sit to stand transfer with Supervision  4. Gait x200 feet with Supervision using Rolling Walker  5. Ascend/Descend 1 curb step with Supervision using Rolling Walker  6. Lower extremity exercise program x30 reps per handout, with supervision                        History:     Past Medical History:   Diagnosis Date    Charcot Kayla Tooth muscular atrophy     Fibromyalgia     GERD  (gastroesophageal reflux disease)     Hyperlipidemia     Hypertension     Hypertension        Past Surgical History:   Procedure Laterality Date     SECTION      2     SECTION      FOOT SURGERY      REMOVAL OF PLANTAR WART USING LASER      TONSILLECTOMY         Time Tracking:     PT Received On: 19  PT Start Time: 1140     PT Stop Time: 1200  PT Total Time (min): 20 min     Billable Minutes: Evaluation 12 and Gait Training 8      Kate Bailey, PT  2019

## 2019-08-29 NOTE — ADDENDUM NOTE
Addendum  created 08/29/19 1228 by Johnny Lemus MD    Order Reconciliation Section accessed, Order list changed

## 2019-08-29 NOTE — NURSING
Pt admitted to room. Transferred to bed from stretcher. Tolerated well. Will continue to monitor call bell intact and in reach.

## 2019-08-29 NOTE — BRIEF OP NOTE
Comfortable  Vitals Stable  Dressing Dry/Intact  Bilateral lower extremities: Palpable DP, good capillary refill  Motor sensation intact  xrays taken today demonstrate a well fixed and positioned TKA.  S/P TKA  Continue current treatment.

## 2019-08-29 NOTE — OP NOTE
DATE OF PROCEDURE: 8/29/19  PREOPERATIVE DIAGNOSIS: Arthritis, Left knee.   POSTOPERATIVE DIAGNOSIS: Arthritis, Left knee.   PROCEDURES PERFORMED: Left total knee arthroplasty.   SURGEON: Chapito Elkins M.D.   ASSISTANT: Caryn Gardiner PA-C (due to the fact that there was no available   qualified resident, Physician's Assistant Caryn Gardiner acted as first   assistant during this case).     ANESTHESIA: Regional.   COMPLICATIONS: None.   COUNTS: Correct.   DISPOSITION: Recovery Room, stable.   SPECIMENS: Bone and cartilage.   FINDINGS: Tricompartmental degenerative change.   FLUIDS: 2000 mL.   BLOOD LOSS: Less than 50 mL.   IMPLANTS: Rukhsana Triathlon, size 3 Left posterior stabilized   cemented femoral component with a 3 cemented tibial tray, a 29 mm 3-peg   patella and a size 3, 9 mm posterior stabilized polyethylene insert.   INDICATIONS FOR PROCEDURE: Lora Goins is a 63-year-old female who has   severe arthritis in the Left knee . She is having continued pain in the   Left knee and did not respond to nonoperative measures, decided to undergo   Left knee replacement. She is aware of reasonable treatment options as   well as risks and benefits. {She did not respond to NSAIDS or injections. She received a course or pre-operative physical therapy.  PROCEDURE IN DETAIL: After appropriate consent was obtained, the patient   Was brought in the Operating Room, anesthesia was administered. She received   antibiotic prophylaxis.Cast   padding and tourniquet was applied to the proximal Left thigh. Left  lower extremity was prepped and draped in usual sterile fashion. Limb was   elevated, tourniquet was inflated. The knee was flexed. An incision was   made from the tibial tubercle just proximal to the superior pole of the   patella. It was taken down through the skin and retinacular. A medial   parapatellar arthrotomy was performed followed by a standard medial   release. Patellar fat pad was partially excised. ACL  was resected.   Femoral punch was used to make the guide hole for the femoral drill. The   distal cutting guide was set at 6 degrees valgus left. A standard distal femoral cut was made.We were pleased with the alignment and quality of the cut.  The PCL retractor was used to bring   the tibia into the field. Meniscal remnants were resected. The   extramedullary tibial guide was pinned in place using the lateral side, as most   defective, 2 mm bone was taken off of this. We checked the alignment in   both planes both before and after making the cut. We were satisfied with the   alignment of the cut and the amount of bone removed.The femur was then  sized, found to be a size 3. A size 3 cutting guide was pinned in 3   degrees of external rotation. This was based off the posterior condyle,   checked the transepicondylar axis. Anterior, posterior and chamfer cuts   were made. The box guide was pinned in position. Femoral box cut was   made. Bone fragments were removed. Lamina spreaders were   then used to open up posteriorly. The PCL was resected and some soft   tissue debris was removed.  A 9 mm spacer block gave excellent flexion-extension gap balance.   I was also satisfied with the medial and lateral stability. At this   point, the femoral trial was placed. The tibia was sized, found to be a   Size 3. A size 3 tibial trial was pinned in appropriate alignment and   rotation. This was based on the medial one third of the tibial tubercle.  A stem extension hole was drilled. A keel hole was punched and a   trial reduction was performed with a size 3, 9 mm insert. She  achieved full   extension. There was no recurvatum. She easily had 130 degrees of flexion.   The femoral component ranged symmetrically in the tibial tray. There was   no lift off. There was no instability of full extension, 30 degrees of   flexion, mid flexion and full flexion. Patella was measured and found to   be 24 mm thick, 8 mm of bone removed. The  3-peg holes were drilled 29 mm   3-peg trial was placed. The knee was brought through range of motion. The   patella tracked extremely well. Therefore, at this point, we were   satisfied with knee range of motion and stability, component position,   sizing and alignment as well as patella tracking. All trial components   were removed. Bone was prepared for cementing by pulsatile lavage and   drying. Components were cemented into place, femur followed by tibia.   Meticulous care was taken to remove excess cement. Tibial insert was   firmly seated in the tibial tray. The knee was inspected. There was no   loose body, foreign body or soft tissue interposition. Knee was then   reduced, brought into extension. Patella button was cemented in place.   Excess cement was removed. The wound was then copiously irrigated with   antibiotic-impregnated solution. Once the cement was dried, tranexamic   acid was applied. The arthrotomy was closed with #1 Vicryl. Once the   arthrotomy was closed, the knee was brought through range of motion, stable   as previously described. Patella tracked very well. Retinacular was   closed with 0 Vicryl, subcutaneous layer with 2-0 Vicryl, skin with   monocryl and dermabond. Sterile dressing was applied. Tourniquet was let down.  She was   transferred to a stretcher, brought to Recovery Room in stable condition,   tolerated the procedure well, no known complications.

## 2019-08-29 NOTE — TRANSFER OF CARE
"Anesthesia Transfer of Care Note    Patient: Lora Goins    Procedure(s) Performed: Procedure(s) (LRB):  ARTHROPLASTY, KNEE, TOTAL-[facility] (Left)    Patient location: PACU    Anesthesia Type: regional    Transport from OR: Transported from OR on 6-10 L/min O2 by face mask with adequate spontaneous ventilation    Post pain: adequate analgesia    Post assessment: no apparent anesthetic complications and tolerated procedure well    Post vital signs: stable    Level of consciousness: awake, alert and oriented    Nausea/Vomiting: no nausea/vomiting    Complications: none    Transfer of care protocol was followed      Last vitals:   Visit Vitals  /66 (BP Location: Left arm, Patient Position: Lying)   Pulse 73   Temp 36.8 °C (98.2 °F) (Oral)   Resp 17   Ht 5' 2" (1.575 m)   Wt 75.3 kg (166 lb)   SpO2 100%   Breastfeeding? No   BMI 30.36 kg/m²     "

## 2019-08-29 NOTE — NURSING TRANSFER
Nursing Transfer Note      8/29/2019     Transfer To: 522    Transfer via stretcher    Transported by PCT     Medicines sent: MIVF and PNC@8    Chart send with patient: Yes    Notified: spouse    Patient reassessed at: 08/29 @ 1230       Report given to Dami ZAVALETA RN

## 2019-08-29 NOTE — PLAN OF CARE
Problem: Occupational Therapy Goal  Goal: Occupational Therapy Goal  Goals to be met by: 9/29/19     Patient will increase functional independence with ADLs by performing:    LE Dressing with Stand-by Assistance.  Grooming while standing with Stand-by Assistance.  Toileting from toilet with Stand-by Assistance for hygiene and clothing management.   Toilet transfer to toilet with Stand-by Assistance.    Elvira Valerio OT  8/29/2019      Outcome: Ongoing (interventions implemented as appropriate)  Acute OT eval complete. Goals established. Pt. Is CGA iin bed mobility and ambulation due to decreased balance and fatigue. Pt. Ambulated ~ 10 steps CGA using SW with no LOB or SOB. Pt. Would benefit from OP PT upon d/c. Continue OT POC.    Elvira Valerio OT  8/29/2019

## 2019-08-29 NOTE — ANESTHESIA PROCEDURE NOTES
IPACK Single Injection Block    Patient location during procedure: pre-op   Block not for primary anesthetic.  Reason for block: at surgeon's request and post-op pain management   Post-op Pain Location: L knee pain  Start time: 8/29/2019 6:28 AM  Timeout: 8/29/2019 6:28 AM   End time: 8/29/2019 6:32 AM    Staffing  Authorizing Provider: Sanjeev Conte MD  Performing Provider: Hakeem Calderon MD    Preanesthetic Checklist  Completed: patient identified, site marked, surgical consent, pre-op evaluation, timeout performed, IV checked, risks and benefits discussed and monitors and equipment checked  Peripheral Block  Patient position: supine  Prep: ChloraPrep  Patient monitoring: heart rate, cardiac monitor, continuous pulse ox, continuous capnometry and frequent blood pressure checks  Block type: I PACK  Laterality: left  Injection technique: single shot  Needle  Needle type: Stimuplex   Needle gauge: 21 G  Needle length: 4 in  Needle localization: anatomical landmarks and ultrasound guidance   -ultrasound image captured on disc.  Assessment  Injection assessment: negative aspiration, negative parasthesia and local visualized surrounding nerve  Paresthesia pain: none  Heart rate change: no  Slow fractionated injection: yes  Additional Notes  VSS.  [facility] RN monitoring vitals throughout procedure.  Patient tolerated procedure well.

## 2019-08-29 NOTE — PT/OT/SLP EVAL
Occupational Therapy   Evaluation    Name: Lora Goins  MRN: [id number]  Admitting Diagnosis:  <principal problem not specified> Day of Surgery    Recommendations:     Discharge Recommendations: outpatient PT  Discharge Equipment Recommendations:  none  Barriers to discharge:  None    Assessment:     Lora Goins is a 63 y.o. female with a medical diagnosis of <principal problem not specified>.  She presents with the following Performance deficits affecting function: weakness, impaired endurance, impaired self care skills, impaired functional mobilty, impaired balance, decreased safety awareness, pain.       Pt. Is CGA iin bed mobility and ambulation due to decreased balance and fatigue. Pt. Ambulated ~ 10 steps CGA using SW with no LOB or SOB. Pt. Would benefit from OP PT upon d/c.     Rehab Prognosis: Good; patient would benefit from acute skilled OT services to address these deficits and reach maximum level of function.       Plan:     Patient to be seen daily to address the above listed problems via self-care/home management, therapeutic activities, therapeutic exercises  · Plan of Care Expires: 09/05/19  · Plan of Care Reviewed with: patient    Subjective     Chief Complaint: none stated  Patient/Family Comments/goals: to go home    Occupational Profile:  Living Environment: Pt. livs in [facility] with . No steps. Pt. Has walk-in shower.  Previous level of function: Independent  Equipment Used at Home:  walker, rolling, shower chair  Assistance upon Discharge:  will be able to assist upon d/c.    Pain/Comfort:  · Pain Rating 1: 2/10  · Location - Side 1: Left  · Location 1: knee  · Pain Addressed 1: Reposition, Distraction, Cessation of Activity    Patients cultural, spiritual, Christianity conflicts given the current situation: no    Objective:     Communicated with: RN prior to session.  Patient found supine with cryotherapy, pulse ox (continuous), telemetry, FCD, peripheral IV, perineural catheter  upon OT entry to room.    General Precautions: Standard, fall   Orthopedic Precautions:LLE weight bearing as tolerated   Braces:       Occupational Performance:    Bed Mobility:    · Patient completed Supine to Sit with contact guard assistance  · Patient completed Sit to Supine with contact guard assistance    Functional Mobility/Transfers:  · Patient completed Sit <> Stand Transfer with contact guard assistance  with  standard walker   · Functional Mobility: Pt. Is CGA iin bed mobility and ambulation due to decreased balance and fatigue. Pt. Ambulated ~ 10 steps CGA using SW with no LOB or SOB. Pt. Would benefit from OP PT upon d/c.     Activities of Daily Living:  · Upper Body Dressing: maximal assistance wicho gown  · Lower Body Dressing: total assistance wicho socks    Cognitive/Visual Perceptual:  Cognitive/Psychosocial Skills:     -       Oriented to: Person, Place, Time and Situation   -       Safety awareness/insight to disability: intact     Physical Exam:  Upper Extremity Range of Motion:     -       Right Upper Extremity: WFL  -       Left Upper Extremity: WFL  Upper Extremity Strength:    -       Right Upper Extremity: WFL  -       Left Upper Extremity: WFL   Strength:    -       Right Upper Extremity: WFL  -       Left Upper Extremity: WFL    AMPAC 6 Click ADL:  AMPAC Total Score: 20    Treatment & Education:   Pt. Ambulated 5 steps forward, 5 steps backward, and 1 side steps.    Education:    Patient left supine with all lines intact, call button in reach and RN notified    GOALS:   Multidisciplinary Problems     Occupational Therapy Goals        Problem: Occupational Therapy Goal    Goal Priority Disciplines Outcome Interventions   Occupational Therapy Goal     OT, PT/OT Ongoing (interventions implemented as appropriate)    Description:  Goals to be met by: 9/29/19     Patient will increase functional independence with ADLs by performing:    LE Dressing with Stand-by Assistance.  Grooming while  standing with Stand-by Assistance.  Toileting from toilet with Stand-by Assistance for hygiene and clothing management.   Toilet transfer to toilet with Stand-by Assistance.    Elvira Valerio OT  2019                        History:     Past Medical History:   Diagnosis Date    Charcot Kayla Tooth muscular atrophy     Fibromyalgia     GERD (gastroesophageal reflux disease)     Hyperlipidemia     Hypertension     Hypertension        Past Surgical History:   Procedure Laterality Date     SECTION      2     SECTION      FOOT SURGERY      REMOVAL OF PLANTAR WART USING LASER      TONSILLECTOMY         Time Tracking:     OT Date of Treatment: 19  OT Start Time: 1140  OT Stop Time: 1200  OT Total Time (min): 20 min    Billable Minutes:Evaluation 10  Therapeutic Activity 10    Elvira Valerio OT  2019

## 2019-08-29 NOTE — INTERVAL H&P NOTE
Lora Goins was interviewed, examined and the H and P reviewed.  There has been no interval change in her History and Physical.

## 2019-08-30 ENCOUNTER — CLINICAL SUPPORT (OUTPATIENT)
Dept: REHABILITATION | Facility: HOSPITAL | Age: 64
End: 2019-08-30
Payer: COMMERCIAL

## 2019-08-30 VITALS
DIASTOLIC BLOOD PRESSURE: 76 MMHG | TEMPERATURE: 98 F | RESPIRATION RATE: 17 BRPM | OXYGEN SATURATION: 99 % | WEIGHT: 166 LBS | HEIGHT: 62 IN | BODY MASS INDEX: 30.55 KG/M2 | SYSTOLIC BLOOD PRESSURE: 141 MMHG | HEART RATE: 65 BPM

## 2019-08-30 DIAGNOSIS — R53.1 WEAKNESS: ICD-10-CM

## 2019-08-30 DIAGNOSIS — R26.2 DIFFICULTY WALKING: ICD-10-CM

## 2019-08-30 DIAGNOSIS — M25.662 DECREASED RANGE OF MOTION OF LEFT KNEE: ICD-10-CM

## 2019-08-30 PROCEDURE — 25000003 PHARM REV CODE 250: Performed by: STUDENT IN AN ORGANIZED HEALTH CARE EDUCATION/TRAINING PROGRAM

## 2019-08-30 PROCEDURE — 97162 PT EVAL MOD COMPLEX 30 MIN: CPT | Mod: PO

## 2019-08-30 PROCEDURE — 97110 THERAPEUTIC EXERCISES: CPT | Mod: PO

## 2019-08-30 PROCEDURE — 97116 GAIT TRAINING THERAPY: CPT

## 2019-08-30 PROCEDURE — 97110 THERAPEUTIC EXERCISES: CPT

## 2019-08-30 PROCEDURE — 99232 SBSQ HOSP IP/OBS MODERATE 35: CPT | Mod: COE,,, | Performed by: ANESTHESIOLOGY

## 2019-08-30 PROCEDURE — 99232 PR SUBSEQUENT HOSPITAL CARE,LEVL II: ICD-10-PCS | Mod: COE,,, | Performed by: ANESTHESIOLOGY

## 2019-08-30 PROCEDURE — 63600175 PHARM REV CODE 636 W HCPCS: Performed by: NURSE PRACTITIONER

## 2019-08-30 PROCEDURE — 97535 SELF CARE MNGMENT TRAINING: CPT

## 2019-08-30 PROCEDURE — 25000003 PHARM REV CODE 250: Performed by: NURSE PRACTITIONER

## 2019-08-30 RX ADMIN — ROPIVACAINE HYDROCHLORIDE 8 ML/HR: 2 INJECTION, SOLUTION EPIDURAL; INFILTRATION at 05:08

## 2019-08-30 RX ADMIN — AMLODIPINE BESYLATE 5 MG: 5 TABLET ORAL at 08:08

## 2019-08-30 RX ADMIN — OXYCODONE HYDROCHLORIDE 10 MG: 10 TABLET ORAL at 05:08

## 2019-08-30 RX ADMIN — ACETAMINOPHEN 1000 MG: 500 TABLET ORAL at 11:08

## 2019-08-30 RX ADMIN — SENNOSIDES,DOCUSATE SODIUM 1 TABLET: 8.6; 5 TABLET, FILM COATED ORAL at 08:08

## 2019-08-30 RX ADMIN — OXYCODONE HYDROCHLORIDE 10 MG: 10 TABLET ORAL at 08:08

## 2019-08-30 RX ADMIN — OXYCODONE HYDROCHLORIDE 10 MG: 10 TABLET ORAL at 11:08

## 2019-08-30 RX ADMIN — ACETAMINOPHEN 1000 MG: 500 TABLET ORAL at 05:08

## 2019-08-30 RX ADMIN — EZETIMIBE 10 MG: 10 TABLET ORAL at 08:08

## 2019-08-30 RX ADMIN — CELECOXIB 200 MG: 200 CAPSULE ORAL at 08:08

## 2019-08-30 RX ADMIN — FAMOTIDINE 20 MG: 20 TABLET, FILM COATED ORAL at 08:08

## 2019-08-30 RX ADMIN — ASPIRIN 81 MG: 81 TABLET, COATED ORAL at 08:08

## 2019-08-30 RX ADMIN — DULOXETINE 30 MG: 30 CAPSULE, DELAYED RELEASE ORAL at 08:08

## 2019-08-30 RX ADMIN — MUPIROCIN 1 G: 20 OINTMENT TOPICAL at 08:08

## 2019-08-30 RX ADMIN — POLYETHYLENE GLYCOL 3350 17 G: 17 POWDER, FOR SOLUTION ORAL at 08:08

## 2019-08-30 NOTE — PROGRESS NOTES
Physical Therapy Daily Treatment Note     Name: Lora Goins  Clinic Number: [id number]    Therapy Diagnosis:   Encounter Diagnoses   Name Primary?    Difficulty walking     Weakness     Decreased range of motion of left knee      Physician: Chapito Elkins MD    Visit Date: 9/3/2019    Physician Orders: PT Eval and Treat   Medical Diagnosis from Referral: primary knee OA  Evaluation Date: 8/30/2019  Authorization Period Expiration: 8/14/20  Plan of Care Expiration: 9/6/19  Visit # / Visits authorized: 2/4      Time In: 10:35  Time Out: 11:25  Total Billable Time: 40 minutes (Te3, ice pack)    Precautions: Standard, s/p L TKA     Subjective     Pt reports: she has been able to use the RW with no difficulty. Increased L knee pain this morning at about a 6/10 but she took her pain medicine before coming to PT.  She was compliant with home exercise program.  Response to previous treatment: none  Functional change: better ambulation with RW    Pain: 1/10 (took pain medication 30 min before PT)  Location: left knee      Objective     Lora received therapeutic exercises to develop strength, endurance, ROM and flexibility for ** minutes including:      Heel slides with flexion holds  Ankle pumps   Quad sets with towel   TKE stretch   SAQ over foam roll  Supine clams (R knee fall out)  Glute sets 10 x3  Hip Adduction with ball        Lora received cold pack for 10 minutes to L knee.      Home Exercises Provided and Patient Education Provided     Education provided:   [facility] on importance of HEP. [facility] on proper hand placement for RW.    Written Home Exercises Provided: Patient instructed to cont prior HEP.  Exercises were reviewed and Lora was able to demonstrate them prior to the end of the session.  Lora demonstrated good  understanding of the education provided.     See EMR under Patient Instructions for exercises provided prior visit.    Assessment     No difficulty with exercises today, but increase in  pain still tolerable. Able to perform heel slides with flexion holds but requires band to perform. Min VC needed for exercises. Pt ambulates ~200 feet with RW to mat with decreased TKE at heel strike. Requires VC for this.       Lora is progressing well towards her goals.   Pt prognosis is Good.     Pt will continue to benefit from skilled outpatient physical therapy to address the deficits listed in the problem list box on initial evaluation, provide pt/family education and to maximize pt's level of independence in the home and community environment.     Pt's spiritual, cultural and educational needs considered and pt agreeable to plan of care and goals.     Anticipated barriers to physical therapy: lack of patient follow through    Goals:     Short Term Goals:  1 weeks  1. Pt. to amb. with RW requiring minimal verbal cues from PT for TKE at heel strike  2. Pt to report independence with symptom management - in progress   3. Pt to tolerate HEP to improve ROM and independence with ADL's - in progress     Long Term Goals: 2 weeks  1. Pt. to amb. short distance with RW requiring no correction from PT for TKE at heel strike  2. Pt. to obtain TKA protocol and relay to transfer location  3. Pt. to demonstrate increased knee AROM to 10-95 deg. to improve ease with transitional activities    Plan     Continue with PT POC and return home to [place], [place] to continue PT. Discharge next visit and give [facility] TKA  protocol.     Shanda Eduardo, PT

## 2019-08-30 NOTE — PLAN OF CARE
Patient discharge with spouse on today. Patient to have outpatient therapy, all dme in place.  Future Appointments   Date Time Provider Department Center   8/30/2019  4:00 PM Susana Larson, PT [facility]   9/3/2019 11:00 AM Shanda Eduardo, PT [facility]   9/4/2019 11:00 AM Shanda Eduardo, PT [facility]   9/4/2019  2:15 PM Herlinda Garcia NP [facility] [address]         [facility] [address] [place] [address]  Phone: [phone] Fax: [phone]    Payor: [facility] BLUE SHIELD / Plan: BCBS ALL OUT OF STATE / Product Type: PPO /         08/30/19 1229   Final Note   Assessment Type Final Discharge Note   Anticipated Discharge Disposition Home  (outpatient therapy)   What phone number can be called within the next 1-3 days to see how you are doing after discharge?   (spouse Brown [phone])   Hospital Follow Up  Appt(s) scheduled? Yes   Discharge plans and expectations educations in teach back method with documentation complete? Yes   Right Care Referral Info   Post Acute Recommendation No Care     Nette Dc RN/BSN/CM  [facility]  [phone]  Ortho/[facility]

## 2019-08-30 NOTE — ADDENDUM NOTE
Addendum  created 08/30/19 1133 by Alexandria Desir MD    Charge Capture section accepted, Sign clinical note

## 2019-08-30 NOTE — ASSESSMENT & PLAN NOTE
Lora Goins is a 63 y.o. female s/p TKA on 8/29/2019    - WBAT  - PT/OT  - Pain control  - abx: post op  - DVT PPx: asa 81 bid  - Sequential Foot Compression Devices to be worn at all times when not ambulating.   - bulky dc'd    Dispo: discharge today

## 2019-08-30 NOTE — PT/OT/SLP PROGRESS
Physical Therapy Treatment and Discharge    Patient Name:  Lora Goins   MRN:  [id number]    Recommendations:     Discharge Recommendations:  outpatient PT   Discharge Equipment Recommendations: none   Barriers to discharge: None    Assessment:     Lora Goins is a 63 y.o. female admitted with a medical diagnosis of S/P TKR (total knee replacement) using cement, left.  She presents with the following impairments/functional limitations:  weakness, impaired endurance, impaired functional mobilty, gait instability, impaired balance, decreased lower extremity function, decreased ROM, orthopedic precautions, impaired skin, pain, impaired joint extensibility. Patient tolerated PT session well today. She ambulated 140ft and 20ft with SBA and RW. She performed L LE therex x10 reps. She is ready for discharge from PT standpoint.     Rehab Prognosis: Good; patient would benefit from acute skilled PT services to address these deficits and reach maximum level of function.    Recent Surgery: Procedure(s) (LRB):  ARTHROPLASTY, KNEE, TOTAL-[facility]SY (Left) 1 Day Post-Op    Plan:     During this hospitalization, patient to be seen daily to address the identified rehab impairments via gait training, therapeutic activities, therapeutic exercises and progress toward the following goals:    · Plan of Care Expires:  09/05/19    Subjective     Chief Complaint: Pain in left knee.   Patient/Family Comments/goals: To walk without pain.   Pain/Comfort:  · Pain Rating 1: (did not rate)  · Location - Side 1: Left  · Location - Orientation 1: posterior  · Location 1: knee  · Pain Addressed 1: Pre-medicate for activity, Reposition, Distraction, Cessation of Activity      Objective:     Communicated with RN prior to session.  Patient found seated on EOB with  with perineural catheter, cryotherapy upon PT entry to room.     General Precautions: Standard, fall   Orthopedic Precautions:LLE weight bearing as tolerated   Braces: N/A      Functional Mobility:  · Mat Mobility:     · Supine to Sit: supervision  · Sit to Supine: supervision  · Transfers:     · Sit to Stand:  stand by assistance with rolling walker with verbal cues for safe technique  · Gait: Patient ambulated 140ft and 20ft with stand by assistance and rolling walker. She demonstrated slow gait speed and needed verbal cues for left heel strike while ambulating.       AM-PAC 6 CLICK MOBILITY  Turning over in bed (including adjusting bedclothes, sheets and blankets)?: 4  Sitting down on and standing up from a chair with arms (e.g., wheelchair, bedside commode, etc.): 4  Moving from lying on back to sitting on the side of the bed?: 4  Moving to and from a bed to a chair (including a wheelchair)?: 4  Need to walk in hospital room?: 4  Climbing 3-5 steps with a railing?: 3  Basic Mobility Total Score: 23       Therapeutic Activities and Exercises:  Patient educated in and performed L LE exercises x10 reps for ankle pumps, quad set, glute set, SAQ over bolster, heel slides, hip abd/add, SLR, and LAQ.    Patient educated in:  -PT role and POC  -safety with transfers including hand placement  -gait sequencing and RW management  -OOB activity to maximize recovery including ambulating every hour   -car transfer  -HEP for therex at home with handout provided      Patient left up in chair with all lines intact, call button in reach, RN notified and spouse present.    GOALS:   Multidisciplinary Problems     Physical Therapy Goals     Not on file          Multidisciplinary Problems (Resolved)        Problem: Physical Therapy Goal    Goal Priority Disciplines Outcome Goal Variances Interventions   Physical Therapy Goal   (Resolved)     PT, PT/OT Outcome(s) achieved     Description:  Goals to be met by: 19    Patient will increase functional independence with mobility by performin. Supine to sit with supervision  2. Sit to supine with supervision  3. Sit to stand transfer with  Supervision  4. Gait x200 feet with Supervision using Rolling Walker  5. Ascend/Descend 1 curb step with Supervision using Rolling Walker  6. Lower extremity exercise program x30 reps per handout, with supervision                        Time Tracking:     PT Received On: 08/30/19  PT Start Time: 0856     PT Stop Time: 0925  PT Total Time (min): 29 min     Billable Minutes: Gait Training 15 and Therapeutic Exercise 14    Treatment Type: Treatment  PT/PTA: PT       Kate Bailey, PT  08/30/2019

## 2019-08-30 NOTE — ANESTHESIA POST-OP PAIN MANAGEMENT
Acute Pain Service and Perioperative Surgical Home Progress Note    HPI  Lora Goins is a 63 y.o., female,     Interval history    Surgery:  Procedure(s) (LRB):  ARTHROPLASTY, KNEE, TOTAL-[facility] (Left)    Post Op Day #: 1    Catheter type: Perineural Adductor Canal    Infusion type: Ropivacaine 0.2%  8 ml/hr basal    Problem List:    Active Hospital Problems    Diagnosis  POA    *S/P TKR (total knee replacement) using cement, left [Z96.652]  Not Applicable    CMT (Charcot-Kayla-Tooth disease) [G60.0]  Yes    Essential hypertension [I10]  Yes    GERD (gastroesophageal reflux disease) [K21.9]  Yes    Hyperlipidemia [E78.5]  Yes    Fibromyalgia [M79.7]  Yes      Resolved Hospital Problems   No resolved problems to display.       Subjective:     General appearance of alert, oriented, no complaints   Pain with rest: 1    Numbers   Pain with movement: 3    Numbers   Side Effects    1. Pruritis No    2. Nausea No    3. Motor Blockade No, 0=Ability to raise lower extremities off bed    4. Sedation No, 1=awake and alert    Schedule Medications:    acetaminophen  1,000 mg Oral Q6H    amLODIPine  5 mg Oral Daily    aspirin  81 mg Oral BID    celecoxib  200 mg Oral Daily    DULoxetine  30 mg Oral Daily    ezetimibe  10 mg Oral Daily    famotidine  20 mg Oral BID    mupirocin  1 g Nasal BID    polyethylene glycol  17 g Oral Daily    pregabalin  150 mg Oral QHS    rOPINIRole  0.5 mg Oral QHS    rOPINIRole  4 mg Oral Nightly    senna-docusate 8.6-50 mg  1 tablet Oral BID        Continuous Infusions:   ropivacaine (PF) 2 mg/ml (0.2%) 8 mL/hr (08/30/19 0505)    ropivacaine          PRN Medications:  bisacodyl, morphine, naloxone, ondansetron, oxyCODONE, oxyCODONE, promethazine (PHENERGAN) IVPB, ramelteon, ropivacaine       Antibiotics:  Antibiotics (From admission, onward)    Start     Stop Route Frequency Ordered    08/29/19 2130  mupirocin 2 % ointment 1 g      09/03 2059 Nasl 2 times daily 08/29/19  2121           Objective:     Catheter site clean, dry, intact    Vital Signs (Most Recent):  Temp: 36.4 °C (97.6 °F) (08/30/19 0719)  Pulse: 65 (08/30/19 0719)  Resp: 17 (08/30/19 0719)  BP: (!) 141/76 (08/30/19 0719)  SpO2: 99 % (08/30/19 0719) Vital Signs Range (Last 24H):  Temp:  [35.5 °C (95.9 °F)-36.7 °C (98 °F)]   Pulse:  []   Resp:  [12-41]   BP: (105-160)/(68-87)   SpO2:  [93 %-99 %]      I & O (Last 24H):    Intake/Output Summary (Last 24 hours) at 8/30/2019 0937  Last data filed at 8/29/2019 1243  Gross per 24 hour   Intake 760 ml   Output 525 ml   Net 235 ml       Physical Exam:    GA: Alert, comfortable, no acute distress.   Pulmonary: Clear to auscultation A/P/L. No wheezing, crackles, or rhonchi.  Cardiac: RRR S1 & S2 w/o rubs/murmurs/gallops.   Abdominal:Bowel sounds present. No tenderness to palpation or distension. No appreciable hepatosplenomegaly.   Skin: No jaundice, rashes, or visible lesions.    Laboratory:  CBC: No results for input(s): WBC, RBC, HGB, HCT, PLT, MCV, MCH, MCHC in the last 72 hours.    BMP: No results for input(s): NA, K, CO2, CL, BUN, CREATININE, GLU, MG, PHOS, CALCIUM in the last 72 hours.    No results for input(s): PT, INR, PROTIME, APTT in the last 72 hours.    Assessment:    Pain control adequate    Plan:    1. Pain:  Adductor canal perineural catheter in place and infusing. Good level.  Multimodal pain regimen with intraoperative ketamine and dexamethasone, postoperative acetaminophen, celecoxib, pregabalin, and PRN oxycodone as indicated unless contraindicated.  Will continue to monitor. Plan to send home with On-Q if patient is discharged today.  2. Fibromyalgia: c/w home medication reigmen.   3. HTN: Mildly HTN this morning to 160 systolic. Patient resuming home medication regimen this AM. Will reassess this afternoon.   4. FEN/GI:  Tolerating liquids, advance diet as tolerated.  5. DVT prophylaxis: per ortho.   6. Dispo:  Pt working well with PT/OT. Case  management and SW for placement. Plan for discharge today if patient works well with PT and meets goals.    Johnny Lemus MD PGY 4  Pager: [phone]\  I have seen the patient, reviewed the Resident's assessment and plan. I have personally interviewed and examined the patient at bedside and: agree with the findings.   Pain well controlled with multimodals; d/c home today with ONQ  Tolerating po well  Meeting PT/OT goals  D/c home today

## 2019-08-30 NOTE — ADDENDUM NOTE
Addendum  created 08/30/19 1308 by Khushbu Barahona RN    Sign clinical note, Visit Navigator SmartForm Flowsheet section accepted

## 2019-08-30 NOTE — PROGRESS NOTES
[facility]  Orthopedics  Progress Note    Patient Name: Lora Goins  MRN: [id number]  Admission Date: 8/29/2019  Hospital Length of Stay: 1 days  Attending Provider: Chapito Elkins MD  Primary Care Provider: Shira Stewart NP  Follow-up For: Procedure(s) (LRB):  ARTHROPLASTY, KNEE, TOTAL-[employer] NELSY (Left)    Post-Operative Day: 1 Day Post-Op  Subjective:     Principal Problem:S/P TKR (total knee replacement) using cement, left    Principal Orthopedic Problem: same    Interval History: The patient was seen and examined this morning at the bedside. Patient reports no acute issues overnight.  Pain controlled.  Worked with PT post op and did well.     Review of patient's allergies indicates:   Allergen Reactions    Ezetimibe-simvastatin      Cramps    Lovastatin      cramps    Pitavastatin      cramps    Pravastatin        Current Facility-Administered Medications   Medication    0.9%  NaCl infusion    acetaminophen tablet 1,000 mg    amLODIPine tablet 5 mg    aspirin EC tablet 81 mg    bisacodyl suppository 10 mg    celecoxib capsule 200 mg    DULoxetine DR capsule 30 mg    ezetimibe tablet 10 mg    famotidine tablet 20 mg    morphine injection 2 mg    mupirocin 2 % ointment 1 g    naloxone 0.4 mg/mL injection 0.02 mg    ondansetron injection 4 mg    oxyCODONE immediate release tablet 5 mg    oxyCODONE immediate release tablet Tab 10 mg    polyethylene glycol packet 17 g    pregabalin capsule 150 mg    promethazine (PHENERGAN) 6.25 mg in dextrose 5 % 50 mL IVPB    ramelteon tablet 8 mg    rOPINIRole tablet 0.5 mg    rOPINIRole tablet 4 mg    ropivacaine (PF) 2 mg/ml (0.2%) infusion    ropivacaine 0.2% ON-Q C-BLOC 400 ML (SELECT A FLOW)    senna-docusate 8.6-50 mg per tablet 1 tablet     Objective:     Vital Signs (Most Recent):  Temp: 97.7 °F (36.5 °C) (08/30/19 0526)  Pulse: 62 (08/30/19 0450)  Resp: 17 (08/30/19 0450)  BP: (!) 160/78 (08/30/19 0450)  SpO2:  "99 % (08/30/19 0450) Vital Signs (24h Range):  Temp:  [95.9 °F (35.5 °C)-98 °F (36.7 °C)] 97.7 °F (36.5 °C)  Pulse:  [] 62  Resp:  [12-41] 17  SpO2:  [93 %-100 %] 99 %  BP: (105-160)/(60-89) 160/78     Weight: 75.3 kg (166 lb)  Height: 5' 2" (157.5 cm)  Body mass index is 30.36 kg/m².      Intake/Output Summary (Last 24 hours) at 8/30/2019 0615  Last data filed at 8/29/2019 1243  Gross per 24 hour   Intake 1760 ml   Output 525 ml   Net 1235 ml       Ortho/SPM Exam  Awake/alert/oriented x3, No acute distress, Afebrile, Vital signs stable  Good inspiratory effort with unlaboured breathing  Dressings c/d/i  NVI in operative limb       Assessment/Plan:     * S/P TKR (total knee replacement) using cement, left  Lora Goins is a 63 y.o. female s/p TKA on 8/29/2019    - WBAT  - PT/OT  - Pain control  - abx: post op  - DVT PPx: asa 81 bid  - Sequential Foot Compression Devices to be worn at all times when not ambulating.   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

## 2019-08-30 NOTE — PLAN OF CARE
Problem: Adult Inpatient Plan of Care  Goal: Plan of Care Review  Outcome: Ongoing (interventions implemented as appropriate)  Pt AAOx4. VS as charted. Q2 rounding for pt care and safety. Pain controlled with PRN medication and PNC of ropivacaine. Pt ambulating to bedside commode x1 assist, no falls/injury reported this shift. LLE surgical dressing intact, polar ice on.No reports of NV. SCD in place. Safety precautions maintained - bed in low position, call light in reach, side rails up x2.

## 2019-08-30 NOTE — PLAN OF CARE
[facility]  Physical Therapy Initial Evaluation    Name: Lora Goins  Clinic Number: [id number]    Therapy Diagnosis:   Encounter Diagnoses   Name Primary?    Difficulty walking     Weakness     Decreased range of motion of left knee      Physician: Chapito Elkins MD    Physician Orders: PT Eval and Treat   Medical Diagnosis from Referral: primary knee OA  Evaluation Date: 2019  Authorization Period Expiration: 20  Plan of Care Expiration: 19  Visit # / Visits authorized:     Time In: 4:50  Time Out: 5:30  Total Billable Time: 40 minutes    Precautions: Standard    Subjective   Date of onset: 19  History of current condition - Lora reports: chronic knee pain that was managed medically not physical therapy prior to surgery. Two weeks ago pt. slipped on a toy causing her fall backwards onto her left heel (full compression of left knee). Surgery was already scheduled for 19. Pt. underwent a left TKA on 19. Pt. is from [place], [place] and will return home next week to continue with outpatient physical therapy at home.      Medical History:   Past Medical History:   Diagnosis Date    Charcot Kayla Tooth muscular atrophy     Fibromyalgia     GERD (gastroesophageal reflux disease)     Hyperlipidemia     Hypertension     Hypertension        Surgical History:   Lora Goins  has a past surgical history that includes  section; Tonsillectomy; Removal of plantar wart using laser;  section; Foot surgery; and Total knee arthroplasty (Left, 2019).    Medications:   Lora has a current medication list which includes the following prescription(s): amlodipine, aspirin, celecoxib, docusate sodium, duloxetine, esomeprazole, ezetimibe, gabapentin, ondansetron, oxycodone-acetaminophen, ropinirole, ropinirole, tramadol, and vitamin d2.    Allergies:   Review of patient's allergies indicates:   Allergen Reactions     Ezetimibe-simvastatin      Cramps    Lovastatin      cramps    Pitavastatin      cramps    Pravastatin         Imaging, MRI studies: Postoperative changes are now identified relating to an interval left knee arthroplasty procedure, the prostheses appearing unremarkable.  No unusual postoperative findings or significant detrimental interval change since the preoperative examination of 08/28/2019 appreciated.    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 A with bed mobility    Pain:  Current 0/10, worst 0/10 (pain pump and pain medication), best 0/10   Location: left knee   Description:  Aggravating Factors: Sitting, Walking, Getting out of bed/chair and transitional activities  Easing Factors: medication    Pts goals: return to PLOF    Objective     Observation: deconditioned    Posture: kyphotic    Gait: slightly antalgic, increased knee flexion at midstance and heel strike     Active/Passive ROM: (measured in degrees)   Knee    Right 5-115   Left 12-85     Sensation: Impaired: left knee L3 numbness     Lower Extremity Strength (graded 0-5 out of 5)    Right LE Left LE   Hip flexion: 4/5 3/5   Hip ER: 3/5    Hip IR: 3+/5    Knee extension:  4/5 4-/5   Ankle dorsiflexion: 4-/5 4-/5   Posterior fibers of Gluteus Medius 4/5 4/5   Hip extension: NT NT   Knee flexion:  NT NT   Glute max NT NT   Ankle plantarflexion: NT NT     Function:   Sit <--> Stand: independent    Bed Mobility: independent    Palpation: TTP medial joint line    Flexibility:  mod. tightness left  hamstrings    Edema:     Girth Measurement Joint line   Left 46.5 cm   Right 43.5 cm     CMS Impairment/Limitation/Restriction for FOTO Knee Survey    Therapist reviewed FOTO scores for Lora Goins on 8/30/2019.   FOTO documents entered into [employer] - see Media section.    Limitation Score: 50% (survey not performed)       TREATMENT   Treatment Time In: 5:20  Treatment Time Out: 5:30  Total Treatment time separate from Evaluation: 10 minutes    Lora received therapeutic exercises to develop strength, endurance, ROM and flexibility for 10 minutes including:  heel slides with strap  TKE stretch  QS with heel prop    Home Exercises and Patient Education Provided    Education provided:   - activity modification/avoidance, gait mechanics, body mechanics, and HEP    Written Home Exercises Provided: Patient instructed to cont prior HEP.  Exercises were reviewed and Lora was able to demonstrate them prior to the end of the session.  Lora demonstrated good  understanding of the education provided.     See EMR under Patient Instructions for exercises provided 8/30/2019.    Assessment   Lora is a 63 y.o. female referred to outpatient Physical Therapy with a medical diagnosis of primary knee OA and TKA Pt presents with decreased knee ROM, difficulty walking, and weakness.     Pt prognosis is Good.   Pt will benefit from skilled outpatient Physical Therapy to address the deficits stated above and in the chart below, provide pt/family education, and to maximize pt's level of independence.     Plan of care discussed with patient: Yes  Pt's spiritual, cultural and educational needs considered and patient is agreeable to the plan of care and goals as stated below:     Anticipated Barriers for therapy: lack of patient follow through    Medical Necessity is demonstrated by the following  History  Co-morbidities and personal factors that may impact the plan of care Co-morbidities:   Charcot Kayla foot    Personal Factors:    no deficits     moderate   Examination  Body Structures and Functions, activity limitations and participation restrictions that may impact the plan of care Body Regions:   lower extremities    Body Systems:    ROM  strength  gait  transfers  transitions    Participation Restrictions:   work/household activities    Activity limitations:   Learning and applying knowledge  no deficits    General Tasks and Commands  no deficits    Communication  no deficits    Mobility  walking  driving (bike, car, motorcycle)    Self care  toileting  dressing    Domestic Life  no deficits    Interactions/Relationships  no deficits    Life Areas  no deficits    Community and Social Life  community life         high   Clinical Presentation evolving clinical presentation with changing clinical characteristics moderate   Decision Making/ Complexity Score: moderate     GOALS:   Short Term Goals:  1 weeks  1. Pt. to amb. with RW requiring minimal verbal cues from PT for TKE at heel strike  2. Pt to report independence with symptom management  3. Pt to tolerate HEP to improve ROM and independence with ADL's    Long Term Goals: 2 weeks  1. Pt. to amb. short distance with RW requiring no correction from PT for TKE at heel strike  2. Pt. to obtain TKA protocol and relay to transfer location  3. Pt. to demonstrate increased knee AROM to 10-95 deg. to improve ease with transitional activities    Plan   Plan of care Certification: 8/30/2019 to 9/6/19.    Outpatient Physical Therapy 2 times weekly for 2 weeks to include the following interventions: Manual Therapy, Moist Heat/ Ice, Neuromuscular Re-ed, Patient Education, Therapeutic Activites and Therapeutic Exercise.     Susana Larson, PT

## 2019-08-30 NOTE — PT/OT/SLP PROGRESS
Occupational Therapy   Treatment    Name: Lora Goins  MRN: [id number]  Admitting Diagnosis:  S/P TKR (total knee replacement) using cement, left  1 Day Post-Op    Recommendations:     Discharge Recommendations: outpatient PT  Discharge Equipment Recommendations:  none  Barriers to discharge:  None    Assessment:     Lora Goins is a 63 y.o. female with a medical diagnosis of S/P TKR (total knee replacement) using cement, left.  She presents with the following Performance deficits affecting function are weakness, impaired endurance, impaired self care skills, impaired functional mobilty, gait instability, pain, decreased safety awareness.     Pt. Motivated for therapy and tolerated therapy session well. Pt. Is SBA in bed mobility and functional mobility using RW. Pt. Ambulated ~20ft SBA using RW to bathroom to complete adls. Pt. Safe to d/c with OP PT.     Rehab Prognosis:  Good; patient would benefit from acute skilled OT services to address these deficits and reach maximum level of function.       Plan:     Patient to be seen daily to address the above listed problems via self-care/home management, therapeutic activities, therapeutic exercises  · Plan of Care Expires: 09/05/19  · Plan of Care Reviewed with: patient    Subjective     Pain/Comfort:  · Location - Side 1: Left  · Location 1: knee  · Pain Addressed 1: Reposition, Distraction, Cessation of Activity    Objective:     Communicated with: RN prior to session.  Patient found supine with cryotherapy, perineural catheter upon OT entry to room.    General Precautions: Standard, fall   Orthopedic Precautions:LLE weight bearing as tolerated   Braces:       Occupational Performance:     Bed Mobility:    · Patient completed Supine to Sit with stand by assistance     Functional Mobility/Transfers:  · Patient completed Sit <> Stand Transfer with stand by assistance  with  rolling walker   · Patient completed Toilet Transfer Stand Pivot technique with stand by  assistance with  rolling walker  Functional Mobility: Pt. Is SBA in bed mobility and functional mobility using RW. Pt. Ambulated ~20ft SBA using RW to bathroom to complete adls.    Activities of Daily Living:  · Grooming: modified independence standing at sink  · Upper Body Dressing: modified independence wicho overhead shirt  · Lower Body Dressing: stand by assistance wicho underwear and shorts  · Toileting: modified independence standard toilet      [facility] 6 Click ADL: 22    Treatment & Education:  - OT/POC-  - Importance of mobility to maximize recovery.  - safety w/ functional mobility; hand placement to ensure safe transfers to various surfaces in prep for ADLs  - Reviewed gait sequence and RW management via verbalization and demonstration   - encouraged to ambulate within household environment at least every hour to hour 1/2      Patient left supine with all lines intact, call button in reach, RN notified and spouse presentEducation:      GOALS:   Multidisciplinary Problems     Occupational Therapy Goals        Problem: Occupational Therapy Goal    Goal Priority Disciplines Outcome Interventions   Occupational Therapy Goal     OT, PT/OT Ongoing (interventions implemented as appropriate)    Description:  Goals to be met by: 9/29/19     Patient will increase functional independence with ADLs by performing:    LE Dressing with Stand-by Assistance.  Grooming while standing with Stand-by Assistance.  Toileting from toilet with Stand-by Assistance for hygiene and clothing management.   Toilet transfer to toilet with Stand-by Assistance.    Elvira Valerio OT  8/29/2019                        Time Tracking:     OT Date of Treatment: 08/30/19  OT Start Time: 1037  OT Stop Time: 1058  OT Total Time (min): 21 min    Billable Minutes:Self Care/Home Management 21    Elvira Valerio OT  8/30/2019

## 2019-08-30 NOTE — PLAN OF CARE
POD # 1 s/p L TKA. This CM met with patient and spouse at bedside. Patient lives with spouse who will provide transport on today. Patient is staying in the [facility] until  Wednesday and will be having outpatient therapy at [facility]  8/30 @ 4 pm. All dme in place.      [facility] - [facility] [address]  [address] [address]  Phone: [phone] Fax: [phone]    Payor: BLUE CROSS BLUE SHIELD / Plan: BCBS ALL OUT OF STATE / Product Type: PPO /         08/30/19 0812   Discharge Assessment   Assessment Type Discharge Planning Assessment   Confirmed/corrected address and phone number on facesheet? Yes   Assessment information obtained from? Patient;Caregiver   Expected Length of Stay (days) 1   Communicated expected length of stay with patient/caregiver yes   Prior to hospitilization cognitive status: Alert/Oriented   Prior to hospitalization functional status: Independent   Current cognitive status: Alert/Oriented   Current Functional Status: Assistive Equipment   Facility Arrived From:   (brought in by spouse)   Lives With spouse   Able to Return to Prior Arrangements yes   Is patient able to care for self after discharge? Yes   Who are your caregiver(s) and their phone number(s)?   (spouse Brown [phone])   Patient's perception of discharge disposition home or selfcare   Readmission Within the Last 30 Days no previous admission in last 30 days   Patient currently being followed by outpatient case management? No   Patient currently receives any other outside agency services? No   Equipment Currently Used at Home walker, rolling;shower chair   Do you have any problems affording any of your prescribed medications? No   Is the patient taking medications as prescribed? yes   Does the patient have transportation home? Yes   Transportation Anticipated family or friend will provide   Does the patient receive services at the [facility]mad[facility] Clinic? No   Discharge Plan A  Other  (outpatient therapy)   DME Needed Upon Discharge  none   Patient/Family in Agreement with Plan yes     Nette Dc RN/BSN/CM  [facility]  [phone]  Ortho/IMK/IMH

## 2019-08-30 NOTE — SUBJECTIVE & OBJECTIVE
"Principal Problem:S/P TKR (total knee replacement) using cement, left    Principal Orthopedic Problem: same    Interval History: The patient was seen and examined this morning at the bedside. Patient reports no acute issues overnight.  Pain controlled.  Worked with PT post op and did well.     Review of patient's allergies indicates:   Allergen Reactions    Ezetimibe-simvastatin      Cramps    Lovastatin      cramps    Pitavastatin      cramps    Pravastatin        Current Facility-Administered Medications   Medication    0.9%  NaCl infusion    acetaminophen tablet 1,000 mg    amLODIPine tablet 5 mg    aspirin EC tablet 81 mg    bisacodyl suppository 10 mg    celecoxib capsule 200 mg    DULoxetine DR capsule 30 mg    ezetimibe tablet 10 mg    famotidine tablet 20 mg    morphine injection 2 mg    mupirocin 2 % ointment 1 g    naloxone 0.4 mg/mL injection 0.02 mg    ondansetron injection 4 mg    oxyCODONE immediate release tablet 5 mg    oxyCODONE immediate release tablet Tab 10 mg    polyethylene glycol packet 17 g    pregabalin capsule 150 mg    promethazine (PHENERGAN) 6.25 mg in dextrose 5 % 50 mL IVPB    ramelteon tablet 8 mg    rOPINIRole tablet 0.5 mg    rOPINIRole tablet 4 mg    ropivacaine (PF) 2 mg/ml (0.2%) infusion    ropivacaine 0.2% ON-Q C-BLOC 400 ML (SELECT A FLOW)    senna-docusate 8.6-50 mg per tablet 1 tablet     Objective:     Vital Signs (Most Recent):  Temp: 97.7 °F (36.5 °C) (08/30/19 0526)  Pulse: 62 (08/30/19 0450)  Resp: 17 (08/30/19 0450)  BP: (!) 160/78 (08/30/19 0450)  SpO2: 99 % (08/30/19 0450) Vital Signs (24h Range):  Temp:  [95.9 °F (35.5 °C)-98 °F (36.7 °C)] 97.7 °F (36.5 °C)  Pulse:  [] 62  Resp:  [12-41] 17  SpO2:  [93 %-100 %] 99 %  BP: (105-160)/(60-89) 160/78     Weight: 75.3 kg (166 lb)  Height: 5' 2" (157.5 cm)  Body mass index is 30.36 kg/m².      Intake/Output Summary (Last 24 hours) at 8/30/2019 0615  Last data filed at 8/29/2019 1243  Gross " per 24 hour   Intake 1760 ml   Output 525 ml   Net 1235 ml       Ortho/SPM Exam  Awake/alert/oriented x3, No acute distress, Afebrile, Vital signs stable  Good inspiratory effort with unlaboured breathing  Dressings c/d/i  NVI in operative limb

## 2019-08-30 NOTE — PLAN OF CARE
Problem: Adult Inpatient Plan of Care  Goal: Plan of Care Review  Outcome: Outcome(s) achieved Date Met: 08/30/19  Patient is alert and oriented, vital signs are stable, with patient provided instruction in safety awareness and call light usage. No falls with precautions in effect. No s/s of pressure ulcers, with surgical dressing intact. Patient independent with mobility. Pain managed with PRN medications. Fluids encouraged. Will continue to monitor patient.

## 2019-08-30 NOTE — PROGRESS NOTES
Pt and family present, consent to converting adductor PNC to On Q.  Site CDI.  Educated regarding continued pain management, fall risk, signs of complications, continued monitoring, as well as discontinuing catheter on 9/2  Two numbers obtained for APS to follow up.  Understanding verbalized.

## 2019-08-31 NOTE — ANESTHESIA POST-OP PAIN MANAGEMENT
8/31/2019     Called and spoke to patient about OnQ PNC. Pain well controlled. Denies tinnitus, metallic taste in mouth, weakness in extremity.  Denies erythema, warmth, tenderness, bleeding at site of catheter entry.  Dressing c/d/i. All questions answered. Encouraged patient to call the provided number if any issues arise. Will call again tomorrow.     Toribio Santacruz MD  PGY-3/CA-2  [phone] / [phone]

## 2019-08-31 NOTE — HOSPITAL COURSE
Hospital Course:  On 8/29/2019, the patient arrived to the [facility] on the second floor of [facility] for proper pre-operative management.  Upon completion of pre-operative preparation, the patient was taken back to the operative theatre.  A TKA was performed without complication and the patient was transported to the post anesthesia care unit in stable condition.  After appropriate recovery from the anaesthetic agents used during the surgery, the patient was then transported to the hospital inpatient floor.  The interim of the hospital stay from arrival on the floor up to discharge has been uncomplicated. The patient has experienced minimal electrolyte imbalances that have been repleated accordingly.  The patient's diet has progressed to a regular diet with no nausea or vomiting.  The patient has transitioned from a perineural anesthesia unit and IV medication to an oral pain regimen and is currently pleased with the pain control on this regimen. The patient began participation in physical therapy on post-operative day zero and has progressed throughout the entire hospital stay.  Currently the patient is ambulating with moderate assistance and the physical therapy team feels that the patient's progress is sufficient to allow the patient to be discharged home safely.  The patient agrees with this assessment and desires a discharge to home today.

## 2019-08-31 NOTE — DISCHARGE SUMMARY
[facility]  Orthopedics  Discharge Summary      Patient Name: Lora Goins  MRN: [id number]  Admission Date: 8/29/2019  Hospital Length of Stay: 1 days  Discharge Date and Time: 8/30/2019 12:04 PM  Attending Physician: No att. providers found   Discharging Provider: Dami Haider MD  Primary Care Provider: Shira Stewart NP    HPI:   No notes on file    Procedure(s) (LRB):  ARTHROPLASTY, KNEE, TOTAL-[employer] NELSY (Left)      Hospital Course:  Hospital Course:  On 8/29/2019, the patient arrived to the [facility] on the second floor of [facility] for proper pre-operative management.  Upon completion of pre-operative preparation, the patient was taken back to the operative theatre.  A TKA was performed without complication and the patient was transported to the post anesthesia care unit in stable condition.  After appropriate recovery from the anaesthetic agents used during the surgery, the patient was then transported to the hospital inpatient floor.  The interim of the hospital stay from arrival on the floor up to discharge has been uncomplicated. The patient has experienced minimal electrolyte imbalances that have been repleated accordingly.  The patient's diet has progressed to a regular diet with no nausea or vomiting.  The patient has transitioned from a perineural anesthesia unit and IV medication to an oral pain regimen and is currently pleased with the pain control on this regimen. The patient began participation in physical therapy on post-operative day zero and has progressed throughout the entire hospital stay.  Currently the patient is ambulating with moderate assistance and the physical therapy team feels that the patient's progress is sufficient to allow the patient to be discharged home safely.  The patient agrees with this assessment and desires a discharge to home today.           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] 08/22/2019 Yes    Essential hypertension [I10] 08/22/2019 Yes    GERD (gastroesophageal reflux disease) [K21.9] 08/22/2019 Yes    Hyperlipidemia [E78.5] 08/22/2019 Yes    Fibromyalgia [M79.7] 08/22/2019 Yes      Problems Resolved During this Admission:      Discharged Condition: stable    Disposition: Home or Self Care    Follow Up:    Patient Instructions:      Call MD for:  temperature >100.4     Call MD for:  persistent nausea and vomiting or diarrhea     Call MD for:  severe uncontrolled pain     Call MD for:  redness, tenderness, or signs of infection (pain, swelling, redness, odor or green/yellow discharge around incision site)     Call MD for:  difficulty breathing or increased cough     Call MD for:  severe persistent headache     Call MD for:  worsening rash     Call MD for:  persistent dizziness, light-headedness, or visual disturbances     Call MD for:  increased confusion or weakness     Leave dressing on - Keep it clean, dry, and intact until clinic visit     Sponge bath only until clinic visit     Keep surgical extremity elevated     Ice to affected area     Activity as tolerated     Medications:  Reconciled Home Medications:      Medication List      START taking these medications    aspirin 81 MG EC tablet  Commonly known as:  ECOTRIN  Take 1 tablet (81 mg total) by mouth 2 (two) times daily.     ondansetron 8 MG Tbdl  Commonly known as:  ZOFRAN-ODT  Dissolve 1 tablet (8 mg total) by mouth every 12 (twelve) hours as needed (nausea).     oxyCODONE-acetaminophen 5-325 mg per tablet  Commonly known as:  PERCOCET  Take 1 tablet by mouth every 4 to 6 hours as needed for Pain.     STOOL SOFTENER 100 MG capsule  Generic drug:  docusate sodium  Take 1 capsule (100 mg total) by mouth 2 (two)  times daily as needed for Constipation.        CONTINUE taking these medications    amLODIPine 5 MG tablet  Commonly known as:  NORVASC  Take 5 mg by mouth once daily.     celecoxib 200 MG capsule  Commonly known as:  CeleBREX  Take 200 mg by mouth 2 (two) times daily.     DULoxetine 30 MG capsule  Commonly known as:  CYMBALTA  Take 30 mg by mouth once daily.     ezetimibe 10 mg tablet  Commonly known as:  ZETIA  Take 10 mg by mouth once daily.     gabapentin 300 MG capsule  Commonly known as:  NEURONTIN  Take 300 mg by mouth 2 (two) times daily.     NexIUM 40 MG capsule  Generic drug:  esomeprazole  Take 40 mg by mouth once daily.     * rOPINIRole 0.5 MG tablet  Commonly known as:  REQUIP  Take 0.5 mg by mouth daily as needed.     * rOPINIRole 4 MG tablet  Commonly known as:  REQUIP  Take 4 mg by mouth nightly.     traMADol 50 mg tablet  Commonly known as:  ULTRAM  2 (two) times daily as needed. Pain     VITAMIN D2 50,000 unit Cap  Generic drug:  ergocalciferol  Take 50,000 Units by mouth every 7 days.         * This list has 2 medication(s) that are the same as other medications prescribed for you. Read the directions carefully, and ask your doctor or other care provider to review them with you.            STOP taking these medications    ibuprofen 200 MG tablet  Commonly known as:  KEYUR KOTHARI MD  Orthopedics  [facility]

## 2019-09-01 NOTE — ADDENDUM NOTE
Addendum  created 09/01/19 1211 by Ravinder Zambrano MD    Intraprocedure Event edited, Sign clinical note

## 2019-09-01 NOTE — ANESTHESIA POST-OP PAIN MANAGEMENT
Spoke with patient today. Reports adequate pain control. Instructed to remove PNC today and make sure that the blue tip is intact. Questions answered and concerns addressed. Encouraged to call with any further questions.         Ravinder Zambrano MD  Anesthesiology, Acute Pain Service  [facility]  SpectraLink # [id number]

## 2019-09-01 NOTE — ADDENDUM NOTE
Addendum  created 09/01/19 1211 by Ravinder Zambrano MD    Flowsheet Filed in Popup/Entry activity, LDA removed

## 2019-09-03 ENCOUNTER — PATIENT OUTREACH (OUTPATIENT)
Dept: ORTHOPEDICS | Facility: CLINIC | Age: 64
End: 2019-09-03

## 2019-09-03 ENCOUNTER — TELEPHONE (OUTPATIENT)
Dept: ORTHOPEDICS | Facility: CLINIC | Age: 64
End: 2019-09-03

## 2019-09-03 ENCOUNTER — CLINICAL SUPPORT (OUTPATIENT)
Dept: REHABILITATION | Facility: HOSPITAL | Age: 64
End: 2019-09-03
Attending: ORTHOPAEDIC SURGERY
Payer: COMMERCIAL

## 2019-09-03 DIAGNOSIS — R26.2 DIFFICULTY WALKING: ICD-10-CM

## 2019-09-03 DIAGNOSIS — R53.1 WEAKNESS: ICD-10-CM

## 2019-09-03 DIAGNOSIS — M25.662 DECREASED RANGE OF MOTION OF LEFT KNEE: ICD-10-CM

## 2019-09-03 PROCEDURE — 97110 THERAPEUTIC EXERCISES: CPT | Mod: PO

## 2019-09-03 NOTE — TELEPHONE ENCOUNTER
Spoke to pt, she states they were late to PT appt due to Transportation being late. She states she is not having issues, her pain is controlled. She arranged Mels to pick her up at 10 am today for appt. Informed pt will call later today to schedule a visit. Pt pleased and verbalized understanding.

## 2019-09-03 NOTE — TELEPHONE ENCOUNTER
[facility] Visit. Pt sitting up in chair, she just came from downstairs by the piano. Pt is taking medication as prescribed. Pain well controlled, denies fever. Ambulates with walker with min assist. Encouraged to elevate surgical leg above heart when resting. Pt will see Herlinda RAMIREZ tomorrow at 0900 am. Understanding verbalized.

## 2019-09-04 ENCOUNTER — OFFICE VISIT (OUTPATIENT)
Dept: ORTHOPEDICS | Facility: CLINIC | Age: 64
End: 2019-09-04
Payer: COMMERCIAL

## 2019-09-04 ENCOUNTER — CLINICAL SUPPORT (OUTPATIENT)
Dept: REHABILITATION | Facility: HOSPITAL | Age: 64
End: 2019-09-04
Attending: ORTHOPAEDIC SURGERY
Payer: COMMERCIAL

## 2019-09-04 VITALS — SYSTOLIC BLOOD PRESSURE: 148 MMHG | HEART RATE: 86 BPM | TEMPERATURE: 99 F | DIASTOLIC BLOOD PRESSURE: 90 MMHG

## 2019-09-04 DIAGNOSIS — R26.2 DIFFICULTY WALKING: ICD-10-CM

## 2019-09-04 DIAGNOSIS — Z96.659 STATUS POST KNEE REPLACEMENT, UNSPECIFIED LATERALITY: Primary | ICD-10-CM

## 2019-09-04 DIAGNOSIS — M25.662 DECREASED RANGE OF MOTION OF LEFT KNEE: ICD-10-CM

## 2019-09-04 DIAGNOSIS — R53.1 WEAKNESS: ICD-10-CM

## 2019-09-04 PROCEDURE — 99024 PR POST-OP FOLLOW-UP VISIT: ICD-10-PCS | Mod: S$GLB,COE,, | Performed by: NURSE PRACTITIONER

## 2019-09-04 PROCEDURE — 99024 POSTOP FOLLOW-UP VISIT: CPT | Mod: S$GLB,COE,, | Performed by: NURSE PRACTITIONER

## 2019-09-04 PROCEDURE — 99999 PR PBB SHADOW E&M-EST. PATIENT-LVL III: CPT | Mod: PBBFAC,COE,, | Performed by: NURSE PRACTITIONER

## 2019-09-04 PROCEDURE — 97110 THERAPEUTIC EXERCISES: CPT | Mod: PO

## 2019-09-04 PROCEDURE — 99999 PR PBB SHADOW E&M-EST. PATIENT-LVL III: ICD-10-PCS | Mod: PBBFAC,COE,, | Performed by: NURSE PRACTITIONER

## 2019-09-04 RX ORDER — OXYCODONE AND ACETAMINOPHEN 5; 325 MG/1; MG/1
1 TABLET ORAL
Qty: 42 TABLET | Refills: 0 | Status: SHIPPED | OUTPATIENT
Start: 2019-09-04 | End: 2019-12-18 | Stop reason: ALTCHOICE

## 2019-09-04 RX ORDER — OXYCODONE AND ACETAMINOPHEN 5; 325 MG/1; MG/1
1 TABLET ORAL
Qty: 42 TABLET | Refills: 0 | Status: SHIPPED | OUTPATIENT
Start: 2019-09-04 | End: 2019-09-04

## 2019-09-04 NOTE — LETTER
September 6, 2019      Outside Doctor  [address] [facility] [address]           [facility]  [address], [address]  [facility] [address]  Phone: [phone]          Patient: Lora Goins   MR Number: [id number]   YOB: 1955   Date of Visit: 9/4/2019       Dear Outside Doctor:    Thank you for referring Lora Goins to me for evaluation. Attached you will find relevant portions of my assessment and plan of care.    If you have questions, please do not hesitate to call me. I look forward to following Lora Goins along with you.    Sincerely,    Herlinda Garcia NP    Enclosure  CC:  No Recipients    If you would like to receive this communication electronically, please contact [email or web address] or [phone] to request more information on [employer] access.    For providers and/or their staff who would like to refer a patient to [facility], please contact us through our one-stop-shop provider referral line, Khalif Troncoso, at [phone].    If you feel you have received this communication in error or would no longer like to receive these types of communications, please e-mail [email or web address]

## 2019-09-06 ENCOUNTER — TELEPHONE (OUTPATIENT)
Dept: ORTHOPEDICS | Facility: CLINIC | Age: 64
End: 2019-09-06

## 2019-09-06 NOTE — PROGRESS NOTES
Lora Goins presents for initial post-operative visit following a left total knee arthroplasty performed by Dr. Elkins on 8/29/2019. Tolerating pain medication well.      Exam:   Blood pressure (!) 148/90, pulse 86, temperature 98.5 °F (36.9 °C).   Ambulating well with assistive device.  Incision is clean and dry without drainage or erythema. ROM:0-100    Initial post-operative radiographs reviewed today revealing a well fixed and aligned prosthesis.    A/P:  1 week s/p left total knee replacement  Dr. Elkins interviewed and examined patient today and agrees with plan.   - The patient will maintain the dressing for one more week and then she can shower with no special wound care  - Outpatient PT: orders sent to [place] outpatient therapy location close to her home to start Friday  - Continue aspirin for 1 month from surgery.  - Pain medication refilled prior to discharge  - Follow up in 1 week with her PCP. Pt will call clinic with problems/concerns.

## 2019-09-06 NOTE — TELEPHONE ENCOUNTER
Spoke to pt, she states she is doing well. Asked if she can wear compression hose. Informed her she can if they over her knee. Pt states hers are below the knee so she will not wear. Instructed pt to elevate above the knee. Pt verbalized understanding.

## 2019-09-20 ENCOUNTER — TELEPHONE (OUTPATIENT)
Dept: ORTHOPEDICS | Facility: CLINIC | Age: 64
End: 2019-09-20

## 2019-09-20 NOTE — TELEPHONE ENCOUNTER
Spoke to pt, pt is well controlled, she is doing PT, she saw PCP last week. She will see PCP in 4 weeks. She asked if she can submerge her incision to take a bath. Informed her not to submerge the incision. Pt will discuss with PCP at next visit. Will call pt in about 4 weeks. Pt pleased and verbalized understanding.

## 2019-09-26 ENCOUNTER — TELEPHONE (OUTPATIENT)
Dept: ORTHOPEDICS | Facility: CLINIC | Age: 64
End: 2019-09-26

## 2019-09-26 NOTE — TELEPHONE ENCOUNTER
"Returned pt's call, she states that her knee has been bothering her since yesterday. Stating it "feels different". She states her knee cap is moving and it feels like it is on the left side of her knee and now causing difficulty with bending her knee. She saw PT yesterday and she states they massaged the area, she is scheduled to see PT tomorrow. Informed her to send a picture thru the portal. Called the PT location and requested last clinic notes and to speak with therapist. Awaiting notes and call back from therapist. message forwarded to Brittni to notify.  "

## 2019-09-26 NOTE — TELEPHONE ENCOUNTER
Spoke to pt, informed her that I spoke with Dr. Elkins and Herlinda RAMIREZ regarding her symptoms. Still awaiting call from therapist. Pt states her surgical leg feels heavy and different. Pt requests to be seen tomorrow and understands she will be seen by Herlinda RAMIREZ as Dr. Elkins will not be available due to travel. Pt does not want to wait till Monday. She states her  will drive the hour and a half and they will drive back home same day. Scheduled appointment for tomorrow at 10:45 am. HDP notified and POC submitted. Understanding verbalized.

## 2019-09-27 ENCOUNTER — OFFICE VISIT (OUTPATIENT)
Dept: ORTHOPEDICS | Facility: CLINIC | Age: 64
End: 2019-09-27
Payer: COMMERCIAL

## 2019-09-27 ENCOUNTER — HOSPITAL ENCOUNTER (OUTPATIENT)
Dept: RADIOLOGY | Facility: HOSPITAL | Age: 64
Discharge: HOME | End: 2019-09-27
Attending: NURSE PRACTITIONER
Payer: COMMERCIAL

## 2019-09-27 DIAGNOSIS — Z96.652 S/P TKR (TOTAL KNEE REPLACEMENT) USING CEMENT, LEFT: Primary | ICD-10-CM

## 2019-09-27 DIAGNOSIS — Z96.652 S/P TKR (TOTAL KNEE REPLACEMENT) USING CEMENT, LEFT: ICD-10-CM

## 2019-09-27 PROCEDURE — 73562 XR KNEE ORTHO LEFT: ICD-10-PCS | Mod: 26,LT,COE, | Performed by: RADIOLOGY

## 2019-09-27 PROCEDURE — 99999 PR PBB SHADOW E&M-EST. PATIENT-LVL II: ICD-10-PCS | Mod: PBBFAC,,, | Performed by: NURSE PRACTITIONER

## 2019-09-27 PROCEDURE — 73560 X-RAY EXAM OF KNEE 1 OR 2: CPT | Mod: 59,TC,RT

## 2019-09-27 PROCEDURE — 73562 X-RAY EXAM OF KNEE 3: CPT | Mod: 26,LT,COE, | Performed by: RADIOLOGY

## 2019-09-27 PROCEDURE — 99024 POSTOP FOLLOW-UP VISIT: CPT | Mod: S$GLB,,, | Performed by: NURSE PRACTITIONER

## 2019-09-27 PROCEDURE — 73560 X-RAY EXAM OF KNEE 1 OR 2: CPT | Mod: 26,59,RT,COE | Performed by: RADIOLOGY

## 2019-09-27 PROCEDURE — 99024 PR POST-OP FOLLOW-UP VISIT: ICD-10-PCS | Mod: S$GLB,,, | Performed by: NURSE PRACTITIONER

## 2019-09-27 PROCEDURE — 99999 PR PBB SHADOW E&M-EST. PATIENT-LVL II: CPT | Mod: PBBFAC,,, | Performed by: NURSE PRACTITIONER

## 2019-09-27 PROCEDURE — 73562 X-RAY EXAM OF KNEE 3: CPT | Mod: TC,LT

## 2019-09-27 PROCEDURE — 73560 XR KNEE ORTHO LEFT: ICD-10-PCS | Mod: 26,59,RT,COE | Performed by: RADIOLOGY

## 2019-09-30 NOTE — PROGRESS NOTES
"Pt returns following a left knee replacement 8/29. She states that she "felt something hard" on her anterior thigh yesterday and she felt like something "popped out of place". She called and wanted to come in today for an xray to ensure that nothing is wrong with her knee replacement. We attempted to get an xray at her pcp office since she lives 2 hours away, but they weren't able to do that and the patient was insistent that she needed to be seen so an appt was made for her for today. Xray was done and does not show any problems. She has full range of motion in her knee without difficulty. ROM: 0-110 degrees. She is ambulating without assistive device. There is no limp. There is no instability. There is no swelling or erythema. She will f/u as needed.  "

## 2019-10-18 ENCOUNTER — TELEPHONE (OUTPATIENT)
Dept: ORTHOPEDICS | Facility: CLINIC | Age: 64
End: 2019-10-18

## 2019-10-18 NOTE — TELEPHONE ENCOUNTER
Spoke to pt, she states she is doing well, she is going to OP PT, she will her PCP for next scheduled f/u on 10/24. She states her FMLA is till 10/29. She may ask for additional time when she sees her PCP. Informed her to call if she has any questions/concerns. Will call back in 6 weeks to check in. Pt verbalized understanding.

## 2019-11-05 ENCOUNTER — TELEPHONE (OUTPATIENT)
Dept: ORTHOPEDICS | Facility: CLINIC | Age: 64
End: 2019-11-05

## 2019-11-05 NOTE — TELEPHONE ENCOUNTER
Spoke to pt, mailed Antibiotic prophylaxis info as requested per pt. Informed her to wait 3 months fater surgery before scheduling teeth cleaning. Pt verbalized understanding.

## 2019-11-12 ENCOUNTER — HISTORICAL (OUTPATIENT)
Dept: LAB | Facility: HOSPITAL | Age: 64
End: 2019-11-12

## 2019-11-12 LAB
CHOLEST SERPL-MCNC: 228 MG/DL (ref 0–200)
CHOLEST/HDLC SERPL: 3.7 {RATIO} (ref 0–4)
DEPRECATED CALCIDIOL+CALCIFEROL SERPL-MC: 34.65 NG/ML (ref 30–80)
HDLC SERPL-MCNC: 62 MG/DL (ref 40–60)
LDLC SERPL CALC-MCNC: 143 MG/DL (ref 0–129)
TRIGL SERPL-MCNC: 113 MG/DL
VLDLC SERPL CALC-MCNC: 23 MG/DL

## 2019-11-26 ENCOUNTER — PATIENT OUTREACH (OUTPATIENT)
Dept: ORTHOPEDICS | Facility: CLINIC | Age: 64
End: 2019-11-26

## 2019-11-26 NOTE — TELEPHONE ENCOUNTER
CJR 3 months completed, pt doing good. Her other knee hurts, stating she got an xray yesterday. She may be ready to have her other knee replaced. Informed her will be happy to have her return. Will call pt in about 9 months for 1 year CJR. Pt pleased and verbalized understanding.

## 2019-12-10 ENCOUNTER — HISTORICAL (OUTPATIENT)
Dept: LAB | Facility: HOSPITAL | Age: 64
End: 2019-12-10

## 2019-12-10 LAB
ALBUMIN SERPL-MCNC: 3.8 GM/DL (ref 3.4–5)
ALP SERPL-CCNC: 94 UNIT/L (ref 46–116)
ALT SERPL-CCNC: 24 UNIT/L (ref 12–78)
AST SERPL-CCNC: 22 UNIT/L (ref 15–37)
BILIRUB SERPL-MCNC: 0.9 MG/DL (ref 0.2–1)
BILIRUBIN DIRECT+TOT PNL SERPL-MCNC: 0.2 MG/DL (ref 0–0.2)
BILIRUBIN DIRECT+TOT PNL SERPL-MCNC: 0.7 MG/DL (ref 0–0.8)
CHOLEST SERPL-MCNC: 230 MG/DL (ref 0–200)
CHOLEST/HDLC SERPL: 3.6 {RATIO} (ref 0–4)
HDLC SERPL-MCNC: 64 MG/DL (ref 40–60)
LDLC SERPL CALC-MCNC: 143 MG/DL (ref 0–129)
PROT SERPL-MCNC: 7.2 GM/DL (ref 6.4–8.2)
TRIGL SERPL-MCNC: 115 MG/DL
VLDLC SERPL CALC-MCNC: 23 MG/DL

## 2019-12-18 ENCOUNTER — TELEPHONE (OUTPATIENT)
Dept: ORTHOPEDICS | Facility: CLINIC | Age: 64
End: 2019-12-18

## 2019-12-18 RX ORDER — PRAMIPEXOLE DIHYDROCHLORIDE 0.12 MG/1
0.12 TABLET ORAL
COMMUNITY
Start: 2019-09-12 | End: 2020-07-16

## 2019-12-18 RX ORDER — AMLODIPINE BESYLATE 10 MG/1
5 TABLET ORAL DAILY
Refills: 5 | COMMUNITY
Start: 2019-10-10 | End: 2020-07-27 | Stop reason: CLARIF

## 2019-12-18 NOTE — TELEPHONE ENCOUNTER
Spoke with patient re: findings in her medical record.  She stated that she had recent labs completed in November by her PCP and Cardiologist, informed that they will be requested, she also stated that she is having a procedure on Friday for her RLS in her right leg, she wants to know if she should be taking antibiotics.  Writer informed that a message would be forwarded to the physcians for their input.  A call will be returned to her.  Patient verbalized understanding.

## 2019-12-19 ENCOUNTER — TELEPHONE (OUTPATIENT)
Dept: ORTHOPEDICS | Facility: CLINIC | Age: 64
End: 2019-12-19

## 2019-12-19 NOTE — TELEPHONE ENCOUNTER
Spoke with patient, informed that writer did not have a definite answer re: abx, she stated that she would just take them prior to her procedure, as it could not hurt.     She also informed that she no longer sees Dr. Hyatt, (Ortho), she only goes to her PCP, Dr. Stewart for xray's and pain.

## 2020-01-21 ENCOUNTER — TELEPHONE (OUTPATIENT)
Dept: ORTHOPEDICS | Facility: CLINIC | Age: 65
End: 2020-01-21

## 2020-01-21 NOTE — TELEPHONE ENCOUNTER
Spoke with patient, she returned writers' call, writer informed that lab orders were sent to her PCP, she stated that she will go on Saturday to have them drawn, she also reported that she has not seen an Orthopedic since she has had surgery at this facility.    Writer informed that her images will be submitted to the Ortho surgeon for review.  Patient verbalized understanding.

## 2020-01-21 NOTE — TELEPHONE ENCOUNTER
L/M via voicemail.  Phoned patient to inform that lab orders have been faxed to Shira Stewart NP (PCP) and to inquire if she has seen her Ortho physician regarding her right knee since 5/29/19 (last ortho recorded visit received).  Awaiting a return call.

## 2020-01-25 ENCOUNTER — HISTORICAL (OUTPATIENT)
Dept: LAB | Facility: HOSPITAL | Age: 65
End: 2020-01-25

## 2020-02-10 ENCOUNTER — TELEPHONE (OUTPATIENT)
Dept: ORTHOPEDICS | Facility: CLINIC | Age: 65
End: 2020-02-10

## 2020-02-10 NOTE — TELEPHONE ENCOUNTER
L/M via voicemail. Informed patient that she was approved for surgery and she has been tentatively scheduled for 4-7-20.  Awaiting a phone call to confirm.

## 2020-02-11 ENCOUNTER — TELEPHONE (OUTPATIENT)
Dept: ORTHOPEDICS | Facility: CLINIC | Age: 65
End: 2020-02-11

## 2020-02-11 NOTE — TELEPHONE ENCOUNTER
Spoke with patient, she stated that her children are going out of town and she is the designated .  They will be leaving on 4-14-20 and returning on 4-18-20.  Her surgery date has been scheduled for 4-21-20.  She will be [place] [facility] [place].

## 2020-02-11 NOTE — TELEPHONE ENCOUNTER
"Valentin.  Received a call from patient informing that 4-7-20 was "a difficult date" for surgery. She requested that writer call to discuss.    "

## 2020-02-12 ENCOUNTER — TELEPHONE (OUTPATIENT)
Dept: ORTHOPEDICS | Facility: CLINIC | Age: 65
End: 2020-02-12

## 2020-02-12 DIAGNOSIS — M17.11 PRIMARY OSTEOARTHRITIS OF RIGHT KNEE: Primary | ICD-10-CM

## 2020-02-14 ENCOUNTER — TELEPHONE (OUTPATIENT)
Dept: ORTHOPEDICS | Facility: CLINIC | Age: 65
End: 2020-02-14

## 2020-02-14 NOTE — TELEPHONE ENCOUNTER
L/M via voicemail.  Phoned patient to confirm a phone call on Thursday, 3/26 at 0800. Awaiting a return call.

## 2020-02-14 NOTE — TELEPHONE ENCOUNTER
Spoke with patient, stated that she works from 5 am until 2 pm Monday through Friday, she stated the last time Dr. Cervantes spoke with her, it was in the afternoon.  The patient stated that she would like an afternoon call if possible, if not, she can take the morning off from work.      Writer informed that this message will be forwarded and a call returned to her with a response.  Patient verbalized understanding. .

## 2020-02-18 ENCOUNTER — TELEPHONE (OUTPATIENT)
Dept: ORTHOPEDICS | Facility: CLINIC | Age: 65
End: 2020-02-18

## 2020-02-18 NOTE — TELEPHONE ENCOUNTER
L/M via voicemail.  Phoned patient to confirm a phone call with Dr. Cervantes on Thursday, 3/26 at 1700.  Awaiting a return phone call.

## 2020-03-09 DIAGNOSIS — M17.11 PRIMARY OSTEOARTHRITIS OF RIGHT KNEE: Primary | ICD-10-CM

## 2020-03-12 ENCOUNTER — HISTORICAL (OUTPATIENT)
Dept: LAB | Facility: HOSPITAL | Age: 65
End: 2020-03-12

## 2020-05-05 ENCOUNTER — TELEPHONE (OUTPATIENT)
Dept: ORTHOPEDICS | Facility: CLINIC | Age: 65
End: 2020-05-05

## 2020-05-05 NOTE — TELEPHONE ENCOUNTER
----- Message from Ashley Gastelum MA sent at 5/5/2020 12:47 PM CDT -----  Contact: Pt       ----- Message -----  From: Ashley Crowder  Sent: 5/5/2020  12:41 PM CDT  To: Brittni BROOKS Staff    Pt is requesting a call back regarding surgery that was postponed   Pt would like to begin rescheduling these apts to have surgery completed     Pt can be reached at [phone]

## 2020-05-05 NOTE — TELEPHONE ENCOUNTER
Spoke to pt, pt states she received a message from [facility] to call and rescheduled missed appts and surgery. Informed her that the NELSY program has not resumed. We will reach out once we can reschedule her surgery. Pt states she has beach vacation planned in July, informed her that she will need to have surgery after the beach vacation. Pt pleased and verbalized understanding.

## 2020-05-25 ENCOUNTER — TELEPHONE (OUTPATIENT)
Dept: ORTHOPEDICS | Facility: CLINIC | Age: 65
End: 2020-05-25

## 2020-05-25 NOTE — TELEPHONE ENCOUNTER
Spoke with patient, confirmed Physical [facility] location:     [facility]  [address]  [facility]  [address]  Phone: [phone]  Fax: [phone]

## 2020-05-25 NOTE — TELEPHONE ENCOUNTER
L/M via voicemail.  Informed patient that the [facility] program has resumed travel, offered surgery date of 7-7-20.  Awaiting a return call, direct phone number provided

## 2020-06-10 ENCOUNTER — TELEPHONE (OUTPATIENT)
Dept: ORTHOPEDICS | Facility: CLINIC | Age: 65
End: 2020-06-10

## 2020-06-10 DIAGNOSIS — M17.11 PRIMARY OSTEOARTHRITIS OF RIGHT KNEE: Primary | ICD-10-CM

## 2020-06-10 NOTE — TELEPHONE ENCOUNTER
L/M via voicemail.  Phoned patient to confirm her work hours of 5 am to 2 pm so that writer may request a phone call with Dr. Cervantes.  Awaiting a return call.

## 2020-06-17 ENCOUNTER — TELEPHONE (OUTPATIENT)
Dept: ORTHOPEDICS | Facility: CLINIC | Age: 65
End: 2020-06-17

## 2020-06-17 DIAGNOSIS — M17.11 PRIMARY OSTEOARTHRITIS OF RIGHT KNEE: Primary | ICD-10-CM

## 2020-06-17 NOTE — TELEPHONE ENCOUNTER
Spoke with patient, she stated that her work hours are:    M, T, W - 7 am - 4 pm   Thur, F - 5 am - 2 pm

## 2020-06-18 ENCOUNTER — TELEPHONE (OUTPATIENT)
Dept: ORTHOPEDICS | Facility: CLINIC | Age: 65
End: 2020-06-18

## 2020-06-26 ENCOUNTER — HISTORICAL (OUTPATIENT)
Dept: RADIOLOGY | Facility: HOSPITAL | Age: 65
End: 2020-06-26

## 2020-07-06 ENCOUNTER — HISTORICAL (OUTPATIENT)
Dept: LAB | Facility: HOSPITAL | Age: 65
End: 2020-07-06

## 2020-07-06 DIAGNOSIS — M17.11 PRIMARY OSTEOARTHRITIS OF RIGHT KNEE: Primary | ICD-10-CM

## 2020-07-06 DIAGNOSIS — Z01.818 PRE-OP TESTING: Primary | ICD-10-CM

## 2020-07-09 ENCOUNTER — TELEPHONE (OUTPATIENT)
Dept: ORTHOPEDICS | Facility: CLINIC | Age: 65
End: 2020-07-09

## 2020-07-09 ENCOUNTER — PATIENT OUTREACH (OUTPATIENT)
Dept: ORTHOPEDICS | Facility: CLINIC | Age: 65
End: 2020-07-09

## 2020-07-09 NOTE — TELEPHONE ENCOUNTER
CJR questions completed for LTKA, provided joint education for upcoming RTKA, pt will arrive on 7/19, she will drive, her  is the caregiver. Covid test scheduled on 7/20. Pt will bring walker. [employer] will stay home. Answered pts questions/concerns. Understanding verbalized.

## 2020-07-13 ENCOUNTER — TELEPHONE (OUTPATIENT)
Dept: ORTHOPEDICS | Facility: CLINIC | Age: 65
End: 2020-07-13

## 2020-07-13 NOTE — TELEPHONE ENCOUNTER
Spoke with patient, she stated that she was worried that her surgery (7-28-20) would be cancelled due to COVID-19, she stated that someone she knew surgery was cancelled in [place], [place].  Writer explained that this facility has not been notified of any closures or cancellations to date.  Patient verbalized understanding.

## 2020-07-16 ENCOUNTER — TELEPHONE (OUTPATIENT)
Dept: ORTHOPEDICS | Facility: CLINIC | Age: 65
End: 2020-07-16

## 2020-07-16 ENCOUNTER — OFFICE VISIT (OUTPATIENT)
Dept: INTERNAL MEDICINE | Facility: CLINIC | Age: 65
End: 2020-07-16
Payer: COMMERCIAL

## 2020-07-16 DIAGNOSIS — R17 ELEVATED BILIRUBIN: ICD-10-CM

## 2020-07-16 DIAGNOSIS — Z79.1 NSAID LONG-TERM USE: ICD-10-CM

## 2020-07-16 DIAGNOSIS — R29.898 JAW CLICKING: ICD-10-CM

## 2020-07-16 DIAGNOSIS — M79.7 FIBROMYALGIA: ICD-10-CM

## 2020-07-16 DIAGNOSIS — E66.9 CLASS 1 OBESITY WITH BODY MASS INDEX (BMI) OF 31.0 TO 31.9 IN ADULT, UNSPECIFIED OBESITY TYPE, UNSPECIFIED WHETHER SERIOUS COMORBIDITY PRESENT: ICD-10-CM

## 2020-07-16 DIAGNOSIS — G25.81 RLS (RESTLESS LEGS SYNDROME): ICD-10-CM

## 2020-07-16 DIAGNOSIS — F11.90 CHRONIC, CONTINUOUS USE OF OPIOIDS: ICD-10-CM

## 2020-07-16 DIAGNOSIS — R06.83 SNORING: ICD-10-CM

## 2020-07-16 DIAGNOSIS — I87.2 CHRONIC VENOUS INSUFFICIENCY: ICD-10-CM

## 2020-07-16 DIAGNOSIS — R60.9 EDEMA, UNSPECIFIED TYPE: ICD-10-CM

## 2020-07-16 DIAGNOSIS — R53.1 WEAKNESS: ICD-10-CM

## 2020-07-16 DIAGNOSIS — I10 ESSENTIAL HYPERTENSION: ICD-10-CM

## 2020-07-16 DIAGNOSIS — G60.0 CMT (CHARCOT-MARIE-TOOTH DISEASE): ICD-10-CM

## 2020-07-16 DIAGNOSIS — K21.9 GASTROESOPHAGEAL REFLUX DISEASE, ESOPHAGITIS PRESENCE NOT SPECIFIED: ICD-10-CM

## 2020-07-16 DIAGNOSIS — Z87.891 HISTORY OF TOBACCO ABUSE: ICD-10-CM

## 2020-07-16 PROBLEM — E66.811 CLASS 1 OBESITY WITH BODY MASS INDEX (BMI) OF 31.0 TO 31.9 IN ADULT: Status: ACTIVE | Noted: 2020-07-16

## 2020-07-16 PROCEDURE — 99499 UNLISTED E&M SERVICE: CPT | Mod: 95,COE,, | Performed by: HOSPITALIST

## 2020-07-16 PROCEDURE — 99499 NO LOS: ICD-10-PCS | Mod: 95,COE,, | Performed by: HOSPITALIST

## 2020-07-16 RX ORDER — ROPINIROLE 4 MG/1
TABLET, FILM COATED ORAL
COMMUNITY
Start: 2020-03-16

## 2020-07-16 RX ORDER — HYDROCHLOROTHIAZIDE 25 MG/1
25 TABLET ORAL DAILY
COMMUNITY
Start: 2020-05-02

## 2020-07-16 NOTE — ASSESSMENT & PLAN NOTE
Edema of legs better since reducing Amlodipine , addition of HCTZ    Edema- I suggested avoidance of added salt,avoidance of NSAID's, unless advised or ordered  and suggested Limb elevation and magalis hose use

## 2020-07-16 NOTE — PROGRESS NOTES
Virtual pre op evaluation   Audio only   Location of the patient  Consent obtained for virtual evaluation     Each patient to whom he or she provides medical services by telemedicine is:  (1) informed of the relationship between the physician and patient and the respective role of any other health care provider with respect to management of the patient; and (2) notified that he or she may decline to receive medical services by telemedicine and may withdraw from such care at any time.    Chief complaint-Preoperative evaluation , Perioperative Medical management, complication reduction plan     Date of Evaluation- 07/16/2020    PCP-  Shira Stewart NP    Future cases for Lora Goins [[id number]]     Case ID Status Date Time Roland Procedure Provider Location    [id number] [facility] 7/28/2020  7:00  ARTHROPLASTY, KNEE - MARZENA Elkins MD [[id number]] [facility] [place]      Rt     History of present illness- I had the pleasure of evaluating  this pleasant 64 y.o. lady i prior to her elective Orthopedic surgery. The patient is known  to me .     I have obtained the history by speaking to the patient and by reviewing the electronic health records.    Events leading up to surgery / History of presenting illness -    She has been troubled with moderate-severe Rt knee  pain for the past 1 year  .   Pain increases with activity and decreases with resting.  Hurts at night time also    Relevant health conditions of significance for the perioperative period/ History of presenting illness -    Had contralateral knee replacement Aug 2019   Did well     Subjectively describes health as good     Health conditions of significance for the perioperative period      VICENTE lopez     Lives with   Flat house  Help available post op     Not known to have heart disease , Diabetes Mellitus, Lung disease     Active cardiac conditions- none    Revised cardiac risk index predictors- None     Functional capacity -Examples  of physical activity , works,  house work and  can take a flight of stairs holding on to the railing----- She can undertake all the above activities without  chest pain,chest tightness, Shortness of breath ,dizziness,lightheadedness making her exercise tolerance more,   than 4 Mets.       Review of symptoms    ROS    Constitutional - No significant weight changes ,No fever, chill  Eyes- No new vision changes  ENT- STOP BANG - snoring, elevated blood pressure and age over 50    BMI 31.3   Cardiac-As above   Respiratory- No cough, expectoration and  no hemoptysis  GI- Bowel movements  regular  No overt GI/ blood losses   LMP-aged 39 Y  -No dysuria and  no urinary hesitancy   MS-As above  Neurologic-No unilateral weakness   Psychiatric- No depression,Anxiety  Endocrine-  Prednisone use for over 3 weeks -no  Hematological/Lymphatic-No spontaneous bruising, bleeding    Past Medical history- reviewed in EPIC    Pertinent negatives-   DVT-  Pulmonary embolism-  Heart disease -  Vascular stenting -    Past Surgical history - reviewed in EPIC      No Anaesthetic,Bleeding ,Cardiac problems , ponvwith previous surgeries      Family history- reviewed in EPIC    Gets cancer screening  FH- No ,bleeding / venous thrombosis ,  heart disease in family     Social history- reviewed in EPIC      Medications and Allergies - reviewed in EPIC    Exam    Physical Exam  Constitutional-   General appearance-Conscious,Coherent  Respiratory - Normal Respiratory Effort and  no wheeze    Psychiatric - normal effect    I    Assessment and plan    New problems to me      Preoperative  risk assessment    Cardiac    Revised cardiac risk index ( RCRI ) predictors-0 ---.Functional capacity  Is more than 4 Mets. She will be undergoing a Orthopedic procedure that carries a intermediate risk     Risk of a major Cardiac event ( Defined as death, myocardial infarction, or cardiac arrest at 30 days after noncardiac surgery), based on RCRI score      -3.9%         No further cardiac work up is indicated prior to proceeding with the surgery     American Society of Anesthesiologists Physical status classification ( ASA ) class-2               Perioperative Medical management / Optimization    Please see problem list           Preventive perioperative care    Thromboembolic prophylaxis:  Her risk factors for thrombosis include surgical procedure and age.I suggest  thromboembolic prophylaxis.I suggested being active in the post operative period.   The patient is a candidate for extended DVT prophylaxis    Postoperative pulmonary complication prophylaxis-Risk factors for post operative pulmonary complications include  possible sleep apnea - I suggest incentive Spirometry use ,  early ambulation ,  end tidal carbon dioxide  Monitoring ,  oral care  and  head end of bed elevation      Renal complication prophylaxis-Risk factors for renal complications include Hypertension . I suggest keeping her well hydrated  in the perioperative period    Surgical site Infection Prophylaxis-I  suggest appropriate antibiotic for Prophylaxis against Surgical site infections  No reported Staph infection     Delirium prophylaxis-Risk factors -  opioid use  - I suggest avoidance / minimizing the use of  Benzodiazepines ( unless the patient has been taking it on a regular basis ),Anticholinergic medication,Antihistamines ( like  Benadryl).I suggest minimizing the use of opioid medication and use of IV tylenol,if it is appropriate. I suggest using the lowest possible dose of opioids for the shortest duration possible in the perioperative period. I suggest to Keep shades/blinds open during the day, lights off and shades closed at night to encourage normal sleep/wake cycle.I encourage the presence of the family member with the patient at all times, if at all possible as mental status changes can be picked up early by the family members and they help with reorientation. I encouraged the  presence of family to help with orientation in the perioperative period. Benadryl avoidance suggested     In view of regional anesthesia  the patient  is at risk of postoperative urinary retention.  I suggest avoidance / minimizing the of  Benzodiazepines,Anticholinergic medication,antihistamines ( Benadryl) , if possible in the perioperative period. I suggest using the minimum possible use of opioids for the minimum period of time in the perioperative period. Benadryl avoidance suggested     This visit was focussed on Preoperative evaluation , Perioperative Medical management, complication reduction plans and I suggest that the patient follows up with the primary care ,relevant sub specialists for on going health care      I appreciate the opportunity to be involved in this  pleasant  lady's care.Please feel free to contact me if there were any questions about this consultation     Patient is Medically  optimized   for the above planned surgery/ procedure-Pending examination     Patient/ care giver/ Family member was instructed to call and update me about any changes to health,  medication, office visits ,testing out side of the yeison operative care center , hospitalizations between now and surgery      Dr BENJAMIN Cervantes MD MRCP ( ),[facility]   [facility]  [facility]   Cell [phone]     COVID screening     No fever -  No cough -  No SOB-  No sore throat -  No loss of taste or smell -  No new muscle aches -  No nausea, vomiting , diarrhea-    No calf pain

## 2020-07-16 NOTE — ASSESSMENT & PLAN NOTE
No locking or pain   I suggest that the perioperative team be aware of this so that appropriate  care can be taken

## 2020-07-16 NOTE — ASSESSMENT & PLAN NOTE
Nexium helping   Related to food   No radiation   No associated Sweating, SOB, Nausea, vomiting , diarrhea  Does not sound Cardiac

## 2020-07-16 NOTE — ASSESSMENT & PLAN NOTE
Tramadol use 2 times a day  for about 10 years for Neuropathy      Chronic continuous opioid use- In view of the opioid use, the patient may have opioid tolerance . I suggest considering the possibility of opioid tolerance  in planning post operative pain control      Not known to have seizures - Tramadol can lower seizure threshold

## 2020-07-16 NOTE — TELEPHONE ENCOUNTER
Spoke with patient, she requested the fax number for Dr. Elkins in order to have her disability paperwork completed.  Writer provided the fax number for the Disability office and explained the process.  Patient verbalized understanding.

## 2020-07-16 NOTE — ASSESSMENT & PLAN NOTE
Elevated Indirect Bilirubin -   No suggestion of  hepatic decompensation   Not known to have liver disease      No hemolysis per history      ? Fasting lab related   Likely Gilbert's      No history of cirrhosis of liver or suggestion hepatic decompensation

## 2020-07-16 NOTE — ASSESSMENT & PLAN NOTE
Amlodipine was reduced from 10 mg to 5 mg due to leg swellings   HCTZ  BP- usually 130/80's    \  Hypertension-  Blood pressure is acceptable . I suggest continuation of Amlodipine  during the entire perioperative period. I suggest holding -HCTZ-- on the morning of the surgery and can continue that  post operatively under blood pressure, electrolyte and renal function monitoring as long as they are acceptable.I suggest addressing pain control as uncontrolled pain can increased blood pressure

## 2020-07-16 NOTE — ASSESSMENT & PLAN NOTE
Was on Meloxicam and Vioxx in the past     On Celebrex for a long time   Tried once a day , but had to go back to Twice a day   Hold 1 week pre op      Renal , ulcer effects discussed

## 2020-07-16 NOTE — ASSESSMENT & PLAN NOTE
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

## 2020-07-27 ENCOUNTER — OFFICE VISIT (OUTPATIENT)
Dept: ORTHOPEDICS | Facility: CLINIC | Age: 65
End: 2020-07-27
Payer: COMMERCIAL

## 2020-07-27 ENCOUNTER — HOSPITAL ENCOUNTER (OUTPATIENT)
Dept: RADIOLOGY | Facility: HOSPITAL | Age: 65
Discharge: HOME | End: 2020-07-27
Attending: ORTHOPAEDIC SURGERY
Payer: COMMERCIAL

## 2020-07-27 ENCOUNTER — ANESTHESIA EVENT (OUTPATIENT)
Dept: SURGERY | Facility: HOSPITAL | Age: 65
DRG: 470 | End: 2020-07-27
Payer: COMMERCIAL

## 2020-07-27 ENCOUNTER — HOSPITAL ENCOUNTER (OUTPATIENT)
Dept: PREADMISSION TESTING | Facility: HOSPITAL | Age: 65
Discharge: HOME | End: 2020-07-27
Attending: ANESTHESIOLOGY
Payer: COMMERCIAL

## 2020-07-27 ENCOUNTER — LAB VISIT (OUTPATIENT)
Dept: SURGERY | Facility: CLINIC | Age: 65
End: 2020-07-27
Payer: COMMERCIAL

## 2020-07-27 ENCOUNTER — INITIAL CONSULT (OUTPATIENT)
Dept: INTERNAL MEDICINE | Facility: CLINIC | Age: 65
End: 2020-07-27
Payer: COMMERCIAL

## 2020-07-27 VITALS
DIASTOLIC BLOOD PRESSURE: 80 MMHG | BODY MASS INDEX: 30.48 KG/M2 | TEMPERATURE: 97 F | HEART RATE: 74 BPM | OXYGEN SATURATION: 98 % | WEIGHT: 165.63 LBS | SYSTOLIC BLOOD PRESSURE: 122 MMHG | HEIGHT: 62 IN

## 2020-07-27 VITALS — WEIGHT: 164.88 LBS | BODY MASS INDEX: 30.34 KG/M2 | HEIGHT: 62 IN

## 2020-07-27 DIAGNOSIS — M17.11 PRIMARY OSTEOARTHRITIS OF RIGHT KNEE: Primary | ICD-10-CM

## 2020-07-27 DIAGNOSIS — R60.9 EDEMA, UNSPECIFIED TYPE: ICD-10-CM

## 2020-07-27 DIAGNOSIS — Z01.818 PRE-OP TESTING: ICD-10-CM

## 2020-07-27 DIAGNOSIS — I10 ESSENTIAL HYPERTENSION: ICD-10-CM

## 2020-07-27 DIAGNOSIS — R53.1 WEAKNESS: ICD-10-CM

## 2020-07-27 DIAGNOSIS — N28.9 RENAL IMPAIRMENT: ICD-10-CM

## 2020-07-27 DIAGNOSIS — Z79.1 NSAID LONG-TERM USE: ICD-10-CM

## 2020-07-27 DIAGNOSIS — M79.7 FIBROMYALGIA: ICD-10-CM

## 2020-07-27 DIAGNOSIS — K21.9 GASTROESOPHAGEAL REFLUX DISEASE, ESOPHAGITIS PRESENCE NOT SPECIFIED: ICD-10-CM

## 2020-07-27 DIAGNOSIS — G25.81 RLS (RESTLESS LEGS SYNDROME): ICD-10-CM

## 2020-07-27 DIAGNOSIS — F11.90 CHRONIC, CONTINUOUS USE OF OPIOIDS: ICD-10-CM

## 2020-07-27 DIAGNOSIS — R29.898 JAW CLICKING: ICD-10-CM

## 2020-07-27 DIAGNOSIS — R73.03 PREDIABETES: ICD-10-CM

## 2020-07-27 DIAGNOSIS — R06.83 SNORING: ICD-10-CM

## 2020-07-27 DIAGNOSIS — E78.5 HYPERLIPIDEMIA, UNSPECIFIED HYPERLIPIDEMIA TYPE: ICD-10-CM

## 2020-07-27 DIAGNOSIS — G60.0 CMT (CHARCOT-MARIE-TOOTH DISEASE): ICD-10-CM

## 2020-07-27 DIAGNOSIS — R17 ELEVATED BILIRUBIN: ICD-10-CM

## 2020-07-27 DIAGNOSIS — Z87.891 HISTORY OF TOBACCO ABUSE: ICD-10-CM

## 2020-07-27 DIAGNOSIS — Z01.818 PREOP EXAMINATION: Primary | ICD-10-CM

## 2020-07-27 DIAGNOSIS — I87.2 CHRONIC VENOUS INSUFFICIENCY: ICD-10-CM

## 2020-07-27 DIAGNOSIS — E66.9 OBESITY (BMI 30-39.9): ICD-10-CM

## 2020-07-27 DIAGNOSIS — M17.11 PRIMARY OSTEOARTHRITIS OF RIGHT KNEE: ICD-10-CM

## 2020-07-27 DIAGNOSIS — M17.11 RIGHT KNEE DJD: ICD-10-CM

## 2020-07-27 DIAGNOSIS — Z96.651 STATUS POST TOTAL RIGHT KNEE REPLACEMENT: Primary | ICD-10-CM

## 2020-07-27 PROBLEM — R73.9 HYPERGLYCEMIA: Status: ACTIVE | Noted: 2020-07-27

## 2020-07-27 LAB — SARS-COV-2 RNA RESP QL NAA+PROBE: NOT DETECTED

## 2020-07-27 PROCEDURE — 99999 PR PBB SHADOW E&M-EST. PATIENT-LVL II: ICD-10-PCS | Mod: PBBFAC,COE,, | Performed by: NURSE PRACTITIONER

## 2020-07-27 PROCEDURE — 99499 NO LOS: ICD-10-PCS | Mod: S$GLB,COE,, | Performed by: NURSE PRACTITIONER

## 2020-07-27 PROCEDURE — 73560 XR KNEE 1 OR 2 VIEW RIGHT: ICD-10-PCS | Mod: 26,RT,COE, | Performed by: RADIOLOGY

## 2020-07-27 PROCEDURE — 99999 PR PBB SHADOW E&M-EST. PATIENT-LVL III: ICD-10-PCS | Mod: PBBFAC,COE,, | Performed by: ORTHOPAEDIC SURGERY

## 2020-07-27 PROCEDURE — U0003 INFECTIOUS AGENT DETECTION BY NUCLEIC ACID (DNA OR RNA); SEVERE ACUTE RESPIRATORY SYNDROME CORONAVIRUS 2 (SARS-COV-2) (CORONAVIRUS DISEASE [COVID-19]), AMPLIFIED PROBE TECHNIQUE, MAKING USE OF HIGH THROUGHPUT TECHNOLOGIES AS DESCRIBED BY CMS-2020-01-R: HCPCS

## 2020-07-27 PROCEDURE — 99499 UNLISTED E&M SERVICE: CPT | Mod: S$GLB,COE,, | Performed by: ORTHOPAEDIC SURGERY

## 2020-07-27 PROCEDURE — 73560 X-RAY EXAM OF KNEE 1 OR 2: CPT | Mod: 26,RT,COE, | Performed by: RADIOLOGY

## 2020-07-27 PROCEDURE — 99214 PR OFFICE/OUTPT VISIT, EST, LEVL IV, 30-39 MIN: ICD-10-PCS | Mod: S$GLB,COE,, | Performed by: HOSPITALIST

## 2020-07-27 PROCEDURE — 99999 PR PBB SHADOW E&M-EST. PATIENT-LVL III: CPT | Mod: PBBFAC,COE,, | Performed by: ORTHOPAEDIC SURGERY

## 2020-07-27 PROCEDURE — 99999 PR PBB SHADOW E&M-EST. PATIENT-LVL II: CPT | Mod: PBBFAC,COE,, | Performed by: NURSE PRACTITIONER

## 2020-07-27 PROCEDURE — 99999 PR PBB SHADOW E&M-EST. PATIENT-LVL II: ICD-10-PCS | Mod: PBBFAC,COE,, | Performed by: HOSPITALIST

## 2020-07-27 PROCEDURE — 99214 OFFICE O/P EST MOD 30 MIN: CPT | Mod: S$GLB,COE,, | Performed by: HOSPITALIST

## 2020-07-27 PROCEDURE — 99499 NO LOS: ICD-10-PCS | Mod: S$GLB,COE,, | Performed by: ORTHOPAEDIC SURGERY

## 2020-07-27 PROCEDURE — 99499 UNLISTED E&M SERVICE: CPT | Mod: S$GLB,COE,, | Performed by: NURSE PRACTITIONER

## 2020-07-27 PROCEDURE — 73560 X-RAY EXAM OF KNEE 1 OR 2: CPT | Mod: TC,RT

## 2020-07-27 PROCEDURE — 99999 PR PBB SHADOW E&M-EST. PATIENT-LVL II: CPT | Mod: PBBFAC,COE,, | Performed by: HOSPITALIST

## 2020-07-27 RX ORDER — AMOXICILLIN 250 MG
1 CAPSULE ORAL 2 TIMES DAILY
Status: CANCELLED | OUTPATIENT
Start: 2020-07-27

## 2020-07-27 RX ORDER — SODIUM CHLORIDE 9 MG/ML
INJECTION, SOLUTION INTRAVENOUS CONTINUOUS
Status: CANCELLED | OUTPATIENT
Start: 2020-07-27 | End: 2020-07-28

## 2020-07-27 RX ORDER — MUPIROCIN 20 MG/G
1 OINTMENT TOPICAL 2 TIMES DAILY
Status: CANCELLED | OUTPATIENT
Start: 2020-07-27 | End: 2020-08-01

## 2020-07-27 RX ORDER — POLYETHYLENE GLYCOL 3350 17 G/17G
17 POWDER, FOR SOLUTION ORAL DAILY
Status: CANCELLED | OUTPATIENT
Start: 2020-07-27

## 2020-07-27 RX ORDER — NALOXONE HCL 0.4 MG/ML
0.02 VIAL (ML) INJECTION
Status: CANCELLED | OUTPATIENT
Start: 2020-07-27

## 2020-07-27 RX ORDER — SODIUM CHLORIDE 9 MG/ML
INJECTION, SOLUTION INTRAVENOUS
Status: CANCELLED | OUTPATIENT
Start: 2020-07-27

## 2020-07-27 RX ORDER — MORPHINE SULFATE 10 MG/ML
2 INJECTION, SOLUTION INTRAMUSCULAR; INTRAVENOUS
Status: CANCELLED | OUTPATIENT
Start: 2020-07-27

## 2020-07-27 RX ORDER — LIDOCAINE HYDROCHLORIDE 10 MG/ML
1 INJECTION, SOLUTION EPIDURAL; INFILTRATION; INTRACAUDAL; PERINEURAL
Status: CANCELLED | OUTPATIENT
Start: 2020-07-27

## 2020-07-27 RX ORDER — ACETAMINOPHEN 500 MG
1000 TABLET ORAL EVERY 6 HOURS
Status: CANCELLED | OUTPATIENT
Start: 2020-07-27 | End: 2020-07-29

## 2020-07-27 RX ORDER — CELECOXIB 100 MG/1
200 CAPSULE ORAL DAILY
Status: CANCELLED | OUTPATIENT
Start: 2020-07-27

## 2020-07-27 RX ORDER — MUPIROCIN 20 MG/G
1 OINTMENT TOPICAL
Status: CANCELLED | OUTPATIENT
Start: 2020-07-27

## 2020-07-27 RX ORDER — ASPIRIN 81 MG/1
81 TABLET ORAL 2 TIMES DAILY
Status: CANCELLED | OUTPATIENT
Start: 2020-07-27

## 2020-07-27 RX ORDER — DOCUSATE SODIUM 100 MG/1
100 CAPSULE, LIQUID FILLED ORAL 2 TIMES DAILY PRN
Qty: 60 CAPSULE | Refills: 0 | Status: SHIPPED | OUTPATIENT
Start: 2020-07-27

## 2020-07-27 RX ORDER — SODIUM CHLORIDE 0.9 % (FLUSH) 0.9 %
10 SYRINGE (ML) INJECTION
Status: CANCELLED | OUTPATIENT
Start: 2020-07-27

## 2020-07-27 RX ORDER — OXYCODONE HYDROCHLORIDE 5 MG/1
5 TABLET ORAL
Status: CANCELLED | OUTPATIENT
Start: 2020-07-27

## 2020-07-27 RX ORDER — ACETAMINOPHEN 500 MG
1000 TABLET ORAL
Status: CANCELLED | OUTPATIENT
Start: 2020-07-27

## 2020-07-27 RX ORDER — OXYCODONE HYDROCHLORIDE 5 MG/1
TABLET ORAL
Qty: 50 TABLET | Refills: 0 | Status: SHIPPED | OUTPATIENT
Start: 2020-07-27

## 2020-07-27 RX ORDER — OXYCODONE HYDROCHLORIDE 5 MG/1
10 TABLET ORAL
Status: CANCELLED | OUTPATIENT
Start: 2020-07-27

## 2020-07-27 RX ORDER — FENTANYL CITRATE 50 UG/ML
25 INJECTION, SOLUTION INTRAMUSCULAR; INTRAVENOUS EVERY 5 MIN PRN
Status: CANCELLED | OUTPATIENT
Start: 2020-07-27

## 2020-07-27 RX ORDER — MIDAZOLAM HYDROCHLORIDE 1 MG/ML
1 INJECTION INTRAMUSCULAR; INTRAVENOUS EVERY 5 MIN PRN
Status: CANCELLED | OUTPATIENT
Start: 2020-07-27

## 2020-07-27 RX ORDER — ONDANSETRON 2 MG/ML
4 INJECTION INTRAMUSCULAR; INTRAVENOUS EVERY 8 HOURS PRN
Status: CANCELLED | OUTPATIENT
Start: 2020-07-27

## 2020-07-27 RX ORDER — AMLODIPINE BESYLATE 5 MG/1
5 TABLET ORAL DAILY
COMMUNITY

## 2020-07-27 RX ORDER — TALC
6 POWDER (GRAM) TOPICAL NIGHTLY PRN
Status: CANCELLED | OUTPATIENT
Start: 2020-07-27

## 2020-07-27 RX ORDER — DEXTROMETHORPHAN HYDROBROMIDE, GUAIFENESIN 5; 100 MG/5ML; MG/5ML
650 LIQUID ORAL EVERY 8 HOURS PRN
Qty: 120 TABLET | Refills: 0 | Status: SHIPPED | OUTPATIENT
Start: 2020-07-27

## 2020-07-27 RX ORDER — PREGABALIN 25 MG/1
75 CAPSULE ORAL
Status: CANCELLED | OUTPATIENT
Start: 2020-07-27

## 2020-07-27 RX ORDER — BISACODYL 10 MG
10 SUPPOSITORY, RECTAL RECTAL EVERY 12 HOURS PRN
Status: CANCELLED | OUTPATIENT
Start: 2020-07-27

## 2020-07-27 RX ORDER — ROPIVACAINE HYDROCHLORIDE 2 MG/ML
8 INJECTION, SOLUTION EPIDURAL; INFILTRATION; PERINEURAL CONTINUOUS
Status: CANCELLED | OUTPATIENT
Start: 2020-07-27

## 2020-07-27 RX ORDER — PREGABALIN 25 MG/1
75 CAPSULE ORAL NIGHTLY
Status: CANCELLED | OUTPATIENT
Start: 2020-07-27

## 2020-07-27 RX ORDER — CELECOXIB 100 MG/1
400 CAPSULE ORAL
Status: CANCELLED | OUTPATIENT
Start: 2020-07-27

## 2020-07-27 RX ORDER — FAMOTIDINE 20 MG/1
20 TABLET, FILM COATED ORAL 2 TIMES DAILY
Status: CANCELLED | OUTPATIENT
Start: 2020-07-27

## 2020-07-27 RX ORDER — ASPIRIN 81 MG/1
81 TABLET ORAL 2 TIMES DAILY
Qty: 60 TABLET | Refills: 0 | Status: SHIPPED | OUTPATIENT
Start: 2020-07-27 | End: 2020-08-27

## 2020-07-27 NOTE — ASSESSMENT & PLAN NOTE
Elevated Indirect Bilirubin -   No suggestion of  hepatic decompensation   Not known to have liver disease      No hemolysis per history      ? Fasting lab related   Likely Gilbert's      No history of cirrhosis of liver or suggestion hepatic decompensation     Labs from Dec 2019     Direct, Indirect Bilirubin- N

## 2020-07-27 NOTE — ASSESSMENT & PLAN NOTE
Nexium helping   Related to food   No radiation   No associated Sweating, SOB, Nausea, vomiting , diarrhea  Does not sound Cardiac      Tips to control acid reflux discussed   Contributors- Celebrex, Laying down within 3 hours after eating

## 2020-07-27 NOTE — PROGRESS NOTES
Lora Goins is a 64 y.o. year old here today for a pre-operative visit in preparation for a Right total knee arthroplasty to be performed by Dr. Elkins on 7/28/2020.  she was last seen and treated in the clinic on 7/27/2020. she will be medically optimized by the pre op center. There has been no significant change in medical status since last visit. No fever, chills, malaise, or unexplained weight change.      Allergies, Medications, past medical and surgical history reviewed.    Focused examination performed.    Patient saw surgeon in clinic today.  All questions answered. Patient encouraged to call with questions. Contact information given.     Pre, yeison, and post operative procedures and expectations discussed. Questions were answered. Lora Goins has been educated and is ready to proceed with surgery. Approximately 30 minutes was spent discussing surgical outcomes, plans, procedures pre, yeison, and post operative expections and care.  Surgical consent signed.    Lora Goins will contact us if there are any questions, concerns, or changes in medical status prior to surgery.       Joint class done via Zoom    COVID-19 test date: done in clinic per [facility] protocol     patient will be scheduled with [facility].

## 2020-07-27 NOTE — HPI
History of present illness- I had the pleasure of meeting this pleasant 64 y.o. lady in the pre op clinic prior to her elective Orthopedic surgery. The patient is known  to me . Lora was accompanied by  Mr Dasilva.    I have obtained the history by speaking to the patient and by reviewing the electronic health records.    Events leading up to surgery / History of presenting illness -    She has been troubled with moderate-severe Rt knee  pain for the past 1 year  .   Pain increases with activity and decreases with resting.  Hurts at night time also    No previous Rt knee surgeries, injuries, infections    Relevant health conditions of significance for the perioperative period/ History of presenting illness -    Had contralateral knee replacement Aug 2019   Did well      Subjectively describes health as good      Health conditions of significance for the perioperative period       VICENTE lopez      Lives with   Flat house  Help available post op     Works for [employer]   Physical work     Not known to have heart disease , Diabetes Mellitus, Lung disease

## 2020-07-27 NOTE — H&P (VIEW-ONLY)
CC: Right knee pain    Lora Goins is a 64 y.o. female with history of Right knee pain. Pain is worse with activity and weight bearing.  Patient has experienced interference of activities of daily living due to decreased range of motion and an increase in joint pain and swelling.  Patient has failed non-operative treatment including NSAIDs, corticosteroid injections, viscosupplement injections, and activity modification.  Lora Goins currently ambulates independently.     Relevant medical conditions of significance in perioperative period:  HTN- on medication managed by pcp  Hyperlipidemia- on medication managed by pcp  GERD- on medication managed by pcp    Past Medical History:   Diagnosis Date    Arthritis     Charcot Kayla Tooth muscular atrophy     Fibromyalgia     GERD (gastroesophageal reflux disease)     Hyperlipidemia     Hypertension     Hypertension     Restless leg syndrome        Past Surgical History:   Procedure Laterality Date     SECTION      2     SECTION      FOOT SURGERY      JOINT REPLACEMENT      REMOVAL OF PLANTAR WART USING LASER      TONSILLECTOMY      TOTAL KNEE ARTHROPLASTY Left 2019    Procedure: ARTHROPLASTY, KNEE, TOTAL-MARZENA NELSY;  Surgeon: Chapito Elkins MD;  Location: [facility] OR [address] [place] [address];  Service: Orthopedics;  Laterality: Left;       Family History   Problem Relation Age of Onset    Cancer Mother         brain tumor , hip, blood stream, lungs    Cancer Father     Cancer Sister         lung tumor       Review of patient's allergies indicates:   Allergen Reactions    Ezetimibe-simvastatin      Cramps  Can take Ezetimibe    Lovastatin      cramps    Pitavastatin      cramps    Pravastatin      Muscle cramps         Current Outpatient Medications:     amLODIPine (NORVASC) 5 MG tablet, Take 5 mg by mouth once daily. Take in am of surgery, Disp: , Rfl:     celecoxib (CELEBREX) 200 MG capsule, Take 200 mg by mouth 2 (two) times daily.  Hold for 1 week prior to surgery, Disp: , Rfl:     DULoxetine (CYMBALTA) 30 MG capsule, Take 30 mg by mouth once daily. Take in am of surgery, Disp: , Rfl:     esomeprazole (NEXIUM) 40 MG capsule, Take 40 mg by mouth once daily. Take in am of surgery, Disp: , Rfl:     ezetimibe (ZETIA) 10 mg tablet, Take 10 mg by mouth every evening. Takes at night, Disp: , Rfl:     gabapentin (NEURONTIN) 300 MG capsule, Take 300 mg by mouth 2 (two) times daily. Take if needed, Disp: , Rfl:     hydroCHLOROthiazide (HYDRODIURIL) 25 MG tablet, Take 25 mg by mouth once daily. Hold am of surgery, Disp: , Rfl:     rOPINIRole (REQUIP) 4 MG tablet, Takes at night, Disp: , Rfl:     traMADol (ULTRAM) 50 mg tablet, 2 (two) times daily as needed. Pain   Take if needed, Disp: , Rfl:     Review of Systems:  Constitutional: no fever or chills  Eyes: no visual changes  ENT: no nasal congestion or sore throat  Respiratory: no cough or shortness of breath  Cardiovascular: no chest pain or palpitations  Gastrointestinal: no nausea or vomiting, tolerating diet  Genitourinary: no hematuria or dysuria  Integument/Breast: no rash or pruritis  Hematologic/Lymphatic: no easy bruising or lymphadenopathy  Musculoskeletal: positive for knee pain  Neurological: no seizures or tremors  Behavioral/Psych: no auditory or visual hallucinations  Endocrine: no heat or cold intolerance    PE:  There were no vitals taken for this visit.  General: Pleasant, cooperative, NAD   Gait: antalgic  HEENT: NCAT, sclera nonicteric   Lungs: Respirations clear bilaterally; equal and unlabored.   CV: S1S2; 2+ bilateral upper and lower extremity pulses.   Skin: Intact throughout with no rashes, erythema, or lesions  Extremities: No LE edema,  no erythema or warmth of the skin in either lower extremity.    Right knee exam:  Knee Range of Motion:normal, pain with passive range of motion  Effusion:none  Condition of skin:intact  Location of tenderness:Medial joint line    Strength:normal  Stability: stable to testing    Hip Examination: painless PROM of hip     Radiographs: Radiographs reveal advanced degenerative changes including subchondral cyst formation, subchondral sclerosis, osteophyte formation, joint space narrowing.     Knee Alignment: normal    Diagnosis: osteoarthritis Right knee    Plan: Right total knee arthroplasty    Due to the serious nature of total joint infection and the high prevalence of community acquired MRSA, vancomycin will be used perioperatively.

## 2020-07-27 NOTE — ASSESSMENT & PLAN NOTE
No thrombosis history      Stands 7 hours a day - been working for 38 years     Uses compression stockings- while on her feet     Increased risk of thrombosis in the yeison operative period , compression stocking use discussed    US Sept 2018

## 2020-07-27 NOTE — PROGRESS NOTES
Subjective:      Patient ID: Lora Goins is a 64 y.o. female.    Chief Complaint: Pain of the Right Knee  Left knee doing well  HPI  Lora Goins is a 64 year old female here with a several year history of right knee pain. The patient works in shipping and recieving. There was not a history of trauma.  The pain is severe The pain is located in the global aspect of the knee. There is is not radiation.  There is not catching or locking.   The pain is described as achy. The patient has not had prior surgery. It is aggravated by sitting, standing and walking.  It is not alleviated by rest. There is not numbness or tingling of the lower extremity.  There is not back pain.  She  has tried medications and injections. They have not helped.  She does have difficulty getting in or out of a car, getting dressed, or going up or down stairs.  The patient does not use an assistive device.      Past Medical History:   Diagnosis Date    Arthritis     Charcot Kayla Tooth muscular atrophy     Fibromyalgia     GERD (gastroesophageal reflux disease)     Hyperlipidemia     Hypertension     Hypertension     Restless leg syndrome      Past Surgical History:   Procedure Laterality Date     SECTION      2     SECTION      FOOT SURGERY      JOINT REPLACEMENT      REMOVAL OF PLANTAR WART USING LASER      TONSILLECTOMY      TOTAL KNEE ARTHROPLASTY Left 2019    Procedure: ARTHROPLASTY, KNEE, TOTAL-MARZENA NELSY;  Surgeon: Chapito Elkins MD;  Location: [facility] OR [address] [place] [address];  Service: Orthopedics;  Laterality: Left;     Family History   Problem Relation Age of Onset    Cancer Mother         brain tumor , hip, blood stream, lungs    Cancer Father     Cancer Sister         lung tumor     Social History     Socioeconomic History    Marital status:      Spouse name: Not on file    Number of children: Not on file    Years of education: Not on file    Highest education level: Not on file    Occupational History    Not on file   Social Needs    Financial resource strain: Not on file    Food insecurity     Worry: Not on file     Inability: Not on file    Transportation needs     Medical: Not on file     Non-medical: Not on file   Tobacco Use    Smoking status: Former Smoker     Packs/day: 0.50     Years: 30.00     Pack years: 15.00     Quit date: 2003     Years since quittin.9    Smokeless tobacco: Never Used   Substance and Sexual Activity    Alcohol use: Not Currently    Drug use: Never    Sexual activity: Not on file     Comment: No cycle   Lifestyle    Physical activity     Days per week: Not on file     Minutes per session: Not on file    Stress: Not on file   Relationships    Social connections     Talks on phone: Not on file     Gets together: Not on file     Attends [employer] service: Not on file     Active member of club or organization: Not on file     Attends meetings of clubs or organizations: Not on file     Relationship status: Not on file   Other Topics Concern    Not on file   Social History Narrative    Not on file     Current Outpatient Medications on File Prior to Visit   Medication Sig Dispense Refill    amLODIPine (NORVASC) 10 MG tablet Take 5 mg by mouth once daily.  5    celecoxib (CELEBREX) 200 MG capsule Take 200 mg by mouth 2 (two) times daily.       DULoxetine (CYMBALTA) 30 MG capsule Take 30 mg by mouth once daily.       esomeprazole (NEXIUM) 40 MG capsule Take 40 mg by mouth once daily.       ezetimibe (ZETIA) 10 mg tablet Take 10 mg by mouth every evening.       gabapentin (NEURONTIN) 300 MG capsule Take 300 mg by mouth 2 (two) times daily.       hydroCHLOROthiazide (HYDRODIURIL) 25 MG tablet Take 25 mg by mouth once daily.      rOPINIRole (REQUIP) 4 MG tablet TAKE 1 TABLET BY MOUTH ONCE DAILY AT BEDTIME      traMADol (ULTRAM) 50 mg tablet 2 (two) times daily as needed. Pain       No current facility-administered medications on file prior to  "visit.      Review of patient's allergies indicates:   Allergen Reactions    Ezetimibe-simvastatin      Cramps  Can take Ezetimibe    Lovastatin      cramps    Pitavastatin      cramps    Pravastatin      Muscle cramps       Review of Systems   Constitution: Negative for chills, fever and night sweats.   HENT: Negative for hearing loss.    Eyes: Negative for blurred vision and double vision.   Cardiovascular: Negative for chest pain, claudication and leg swelling.   Respiratory: Negative for shortness of breath.    Endocrine: Negative for polydipsia, polyphagia and polyuria.   Hematologic/Lymphatic: Negative for adenopathy and bleeding problem. Does not bruise/bleed easily.   Skin: Negative for poor wound healing.   Musculoskeletal: Positive for joint pain.   Gastrointestinal: Negative for diarrhea and heartburn.   Genitourinary: Negative for bladder incontinence.   Neurological: Negative for focal weakness, headaches, numbness, paresthesias and sensory change.   Psychiatric/Behavioral: The patient is not nervous/anxious.    Allergic/Immunologic: Negative for persistent infections.         Objective:      Body mass index is 30.16 kg/m².  Vitals:    07/27/20 1048   Weight: 74.8 kg (164 lb 14.5 oz)   Height: 5' 2" (1.575 m)         General    Constitutional: She is oriented to person, place, and time. She appears well-developed and well-nourished.   HENT:   Head: Normocephalic and atraumatic.   Eyes: EOM are normal.   Cardiovascular: Normal rate.    Pulmonary/Chest: Effort normal.   Neurological: She is alert and oriented to person, place, and time.   Psychiatric: She has a normal mood and affect. Her behavior is normal.             Radiographs taken today and reviewed by me demonstrate moderately severe arthritic change of the left KNEE(s).There  is not bone destruction.  There is not a fracture. The changes are tricompartmental.            Assessment:       Encounter Diagnosis   Name Primary?    Primary " osteoarthritis of right knee Yes          Plan:       Lora was seen today for pain.    Diagnoses and all orders for this visit:    Primary osteoarthritis of right knee      Treatment options were discussed. The surgical process of right knee replacement was discussed in detail with the patient including a detailed discussion of the procedure itself (including visual model, x-ray review, and literature review). The typical perioperative and post-operative course was discussed and perioperative risks were discussed to the patient's satisfaction.  Risks and complications discussed included but were not limited to the risks of anesthetic complications, infection, bleeding, wound healing complications, stiffness, aseptic loosening, instability, limb length inequality, neurologic dysfunction including numbness and weakness, additional surgery,  DVT, pulmonary embolism, perioperative medical risks (cardiac, pulmonary, renal, neurologic), and death and the patient elects to proceed.  The patient should get medically cleared and attend the joint seminar.     ASA for DVT prophylaxis

## 2020-07-27 NOTE — LETTER
July 27, 2020      Chapito Elkins MD  [address]  [place] [address]           [facility] - Pre Op Consult  [address] [facility] [address]  Phone: [phone]          Patient: Lora Goins   MR Number: [id number]   YOB: 1955   Date of Visit: 7/27/2020       Dear Dr. Chapito Elkins:    Thank you for referring Lora Goins to me for evaluation. Attached you will find relevant portions of my assessment and plan of care.    If you have questions, please do not hesitate to call me. I look forward to following Lora Goins along with you.    Sincerely,    Myrtle Cervantes MD    Enclosure  CC:  Shira Stewart NP    If you would like to receive this communication electronically, please contact [email or web address] or [phone] to request more information on [employer] access.    For providers and/or their staff who would like to refer a patient to [facility], please contact us through our one-stop-shop provider referral line, [facility], at [phone].    If you feel you have received this communication in error or would no longer like to receive these types of communications, please e-mail [email or web address]

## 2020-07-27 NOTE — ASSESSMENT & PLAN NOTE
Functional   Stays active at work, home   Neuropathy - takes Gabapentin  Does not ware braces  Mother had CMT and had to wear Braces   Feels that she can use a walker post op     Has shooting pain in both arms, legs from Neuropathy  - long standing   Has difficulty picking herself up - long standing     Not a Diabetic   Not known to have low Vit B12    Feet care suggested     No longer under Neurology care

## 2020-07-27 NOTE — ASSESSMENT & PLAN NOTE
Has weight related conditions  - HTN  - HLD  - Acid reflux   - Osteo arthritis      Not known to have fatty liver , type 2 DM     Encouraged weight loss

## 2020-07-27 NOTE — ASSESSMENT & PLAN NOTE
Was on Meloxicam and Vioxx in the past      On Celebrex for a long time   Tried once a day , but had to go back to Twice a day   Holding 1 week pre op      Renal , ulcer effects discussed

## 2020-07-27 NOTE — PLAN OF CARE
Contacted patient to review the Covid-19 Procedure/Surgery Confirmation questionnaire.  Patient receptive to questions and all information provided on instructions concerning our temporary temperature, hand washing/sanitizing and visitor policy as per CDC recommendations. No visitors will be allowed into the hospital at this time.  Patient will be dropped off and picked up at the same location.  We will receive permission to text that person with updates,(if patient wishes to do so) as well as when the patient is ready for discharge.  Phone number [phone] provided for patients to call if they were to develop fever, cough or SOB prior to surgery. Instructed to take temperature the night before and the morning of surgery. Travel questions as per travel questionnaire reviewed. Our cleaning protocols for every surgery and every procedure were reviewed, and reassurance provided that safety, as well as following CDC guidelines, are our top priority. Voiced understanding.    Arrival time of 600 given per blanca for surgery tomorrow at [facility], [address] [place] [address], 1st floor [address]. Instructions given on food/fluid intake, medications, COVID info and visitor policy.  Voiced understanding    Patient is a non smoker

## 2020-07-27 NOTE — H&P
CC: Right knee pain    Lora Goins is a 64 y.o. female with history of Right knee pain. Pain is worse with activity and weight bearing.  Patient has experienced interference of activities of daily living due to decreased range of motion and an increase in joint pain and swelling.  Patient has failed non-operative treatment including NSAIDs, corticosteroid injections, viscosupplement injections, and activity modification.  Lora Goins currently ambulates independently.     Relevant medical conditions of significance in perioperative period:  HTN- on medication managed by pcp  Hyperlipidemia- on medication managed by pcp  GERD- on medication managed by pcp    Past Medical History:   Diagnosis Date    Arthritis     Charcot Kayla Tooth muscular atrophy     Fibromyalgia     GERD (gastroesophageal reflux disease)     Hyperlipidemia     Hypertension     Hypertension     Restless leg syndrome        Past Surgical History:   Procedure Laterality Date     SECTION      2     SECTION      FOOT SURGERY      JOINT REPLACEMENT      REMOVAL OF PLANTAR WART USING LASER      TONSILLECTOMY      TOTAL KNEE ARTHROPLASTY Left 2019    Procedure: ARTHROPLASTY, KNEE, TOTAL-MARZENA NELSY;  Surgeon: Chapito Elkins MD;  Location: [facility] OR [address] [place] [address];  Service: Orthopedics;  Laterality: Left;       Family History   Problem Relation Age of Onset    Cancer Mother         brain tumor , hip, blood stream, lungs    Cancer Father     Cancer Sister         lung tumor       Review of patient's allergies indicates:   Allergen Reactions    Ezetimibe-simvastatin      Cramps  Can take Ezetimibe    Lovastatin      cramps    Pitavastatin      cramps    Pravastatin      Muscle cramps         Current Outpatient Medications:     amLODIPine (NORVASC) 5 MG tablet, Take 5 mg by mouth once daily. Take in am of surgery, Disp: , Rfl:     celecoxib (CELEBREX) 200 MG capsule, Take 200 mg by mouth 2 (two) times daily.  Hold for 1 week prior to surgery, Disp: , Rfl:     DULoxetine (CYMBALTA) 30 MG capsule, Take 30 mg by mouth once daily. Take in am of surgery, Disp: , Rfl:     esomeprazole (NEXIUM) 40 MG capsule, Take 40 mg by mouth once daily. Take in am of surgery, Disp: , Rfl:     ezetimibe (ZETIA) 10 mg tablet, Take 10 mg by mouth every evening. Takes at night, Disp: , Rfl:     gabapentin (NEURONTIN) 300 MG capsule, Take 300 mg by mouth 2 (two) times daily. Take if needed, Disp: , Rfl:     hydroCHLOROthiazide (HYDRODIURIL) 25 MG tablet, Take 25 mg by mouth once daily. Hold am of surgery, Disp: , Rfl:     rOPINIRole (REQUIP) 4 MG tablet, Takes at night, Disp: , Rfl:     traMADol (ULTRAM) 50 mg tablet, 2 (two) times daily as needed. Pain   Take if needed, Disp: , Rfl:     Review of Systems:  Constitutional: no fever or chills  Eyes: no visual changes  ENT: no nasal congestion or sore throat  Respiratory: no cough or shortness of breath  Cardiovascular: no chest pain or palpitations  Gastrointestinal: no nausea or vomiting, tolerating diet  Genitourinary: no hematuria or dysuria  Integument/Breast: no rash or pruritis  Hematologic/Lymphatic: no easy bruising or lymphadenopathy  Musculoskeletal: positive for knee pain  Neurological: no seizures or tremors  Behavioral/Psych: no auditory or visual hallucinations  Endocrine: no heat or cold intolerance    PE:  There were no vitals taken for this visit.  General: Pleasant, cooperative, NAD   Gait: antalgic  HEENT: NCAT, sclera nonicteric   Lungs: Respirations clear bilaterally; equal and unlabored.   CV: S1S2; 2+ bilateral upper and lower extremity pulses.   Skin: Intact throughout with no rashes, erythema, or lesions  Extremities: No LE edema,  no erythema or warmth of the skin in either lower extremity.    Right knee exam:  Knee Range of Motion:normal, pain with passive range of motion  Effusion:none  Condition of skin:intact  Location of tenderness:Medial joint line    Strength:normal  Stability: stable to testing    Hip Examination: painless PROM of hip     Radiographs: Radiographs reveal advanced degenerative changes including subchondral cyst formation, subchondral sclerosis, osteophyte formation, joint space narrowing.     Knee Alignment: normal    Diagnosis: osteoarthritis Right knee    Plan: Right total knee arthroplasty    Due to the serious nature of total joint infection and the high prevalence of community acquired MRSA, vancomycin will be used perioperatively.

## 2020-07-27 NOTE — DISCHARGE INSTRUCTIONS
Your surgery has been scheduled for:__________________________________________    You should report to:  ____[facility], located on the [place] side of the first floor of the           [facility] ([phone])  ____[facility], located on the [facility] side of the            Second floor of the [facility] ([phone])  ____[facility] ([facility]) [facility]ated [facility] [address]  Please Note   - Tell your doctor if you take Aspirin, products containing Aspirin, herbal medications  or blood thinners, such as Coumadin, Ticlid, or Plavix.  (Consult your provider regarding holding or stopping before surgery).  - Arrange for someone to drive you home following surgery.  You will not be allowed to leave the surgical facility alone or drive yourself home following sedation and anesthesia.  Before Surgery  - Stop taking all herbal medications 14days prior to surgery  - No Motrin/Advil (Ibuprofen) 7 days before surgery  - No Aleve (Naproxen) 7 days before surgery  - Stop Taking Aspirin, products containing Aspirin _____days before surgery  - Stop taking blood thinners_______days before surgery  - No Goody's/BC  Powder 7 days before surgery  - Refrain from drinking alcoholic beverages for 24hours before and after surgery  - Stop or limit smoking _________days before surgery  - You may take Tylenol for pain  Night before Surgery   Stop ALL solid food, gum, candy (including vitamins) 8 hours before arrival time.  (Please note: If your surgeon gives you different eating and drinking instructions, please follow surgeon's directions.)   Stop all CLOUDY liquids: coffee with creamer, formula, tube feeds, cloudy juices, non-human milk and breast milk with additives, 6 hours prior to arrival time.   Stop plain breast milk 4 hours prior to arrival time.   The patient should be ENCOURAGED to drink carbohydrate-rich clear liquids (sports  drinks, clear juices) until 2 hours prior to arrival time.   CLEAR liquids include only water, black coffee NO creamer, clear oral rehydration drinks, clear sports drinks or clear fruit juices (no orange juice, no pulpy juices, no apple cider). Advise patients if they can read newsprint through the liquid, it qualifies as clear liquid.    IF IN DOUBT, drink water instead.   - Take a shower or bath (shower is recommended).  Bathe with Hibiclens soap or an antibacterial soap from the neck down.  If not supplied by your surgeon, hibiclens soap will need to be purchased over the counter in pharmacy.  Rinse soap off thoroughly.  - Shampoo your hair with your regular shampoo  The Day of Surgery  · NOTHING TO  DRINK 2 hours before arrival time. If you are told to take medication on the morning of surgery, it may be taken with a sip of water.   - Take another bath or shower with hibiclens or any antibacterial soap, to reduce the chance of infection.  - Take heart and blood pressure medications with a small sip of water, as advised by the perioperative team.  - Do not take fluid pills  - You may brush your teeth and rinse your mouth, but do not swallow any additional water.   - Do not apply perfumes, powder, body lotions or deodorant on the day of surgery.  - Nail polish should be removed.  - Do not wear makeup or moisturizer  - Wear comfortable clothes, such as a button front shirt and loose fitting pants.  - Leave all jewelry, including body piercings, and valuables at home.    - Bring any devices you will neeed after surgery such as crutches or canes.  - If you have sleep apnea, please bring your CPAP machine  In the event that your physical condition changes including the onset of a cold or respiratory illness, or if you have to delay or cancel your surgery, please notify your surgeon.  Anesthesia: Regional Anesthesia    Youre scheduled for surgery. During surgery, youll receive medicine called anesthesia to keep you  comfortable and pain-free. Your surgeon has decided that youll receive regional anesthesia. This sheet tells you what to expect with this type of anesthesia.  What is regional anesthesia?  Regional anesthesia numbs one region of your body. The anesthesia may be given around nerves or into veins in your arms, neck, or legs (nerve block or Tommy block). Or it may be sent into the spinal fluid (spinal anesthesia) or into the space just outside the spinal fluid (epidural anesthesia). You may also be given sedatives to help you relax.  Nerve block or Tommy block  A small area of the body, such as an arm or leg, can be numbed using a nerve block or Belding block.  · Nerve block. During a nerve block, your skin is numbed. A needle is then inserted near nerves that serve the area to be numbed. Anesthetic is sent through the needle.  · IV regional or Tommy block. For this type of block, an IV line is put into a vein. The blood flow to the area to be numbed is blocked for a short time. Anesthetic is sent through the IV.  Spinal anesthesia  Spinal anesthesia numbs your body from about the waist down.  · Anesthetic is injected into the spinal fluid. This is a substance that surrounds the spinal cord in your spinal column. The anesthetic blocks pain traveling from the body to the brain.  · To receive the anesthetic, your skin is numbed at the injection site on your back.  · A needle is then inserted into the spinal space. Anesthetic is sent into the spinal fluid through the needle.  Epidural anesthesia  Epidural anesthesia is most commonly used during childbirth and may also be used after surgical procedures of the chest, belly, and legs.  · Anesthetic is injected into the epidural space. This is just outside the dural sac which contains the spinal fluid.  · To receive the anesthetic, your skin is numbed at the injection site on your back.  · A needle is then inserted into the epidural space. Anesthetic is sent into the epidural  space through the needle.  · A small flexible catheter may be attached to the needle and left in place. This allows for continuous injections or infusions of anesthetic.  Anesthesia tools and medicines that might be near you during your procedure  · Local anesthetic. This medicine is given through a needle numbs one region of your body.  · Electrocardiography leads (electrodes). These are used to record your heart rate and rhythm.  · Blood pressure cuff. A cuff is placed on your arm to keep track of your blood pressure.  · Pulse oximeter. This small clip is placed on the end of the finger. It measures your blood oxygen level.  · Sedatives. These medicines may be given through an IV. They help to relax you and keep you comfortable. You may stay awake or sleep lightly.  · Oxygen. You may be given oxygen through a facemask.  Risks and possible complications  Regional anesthesia carries some risks. These include:  · Nausea and vomiting  · Headache  · Backache  · Decreased blood pressure  · Allergic reaction to the anesthetic  · Ongoing numbness (rare)  · Irregular heartbeat (rare)  · Cardiac arrest (rare)   Date Last Reviewed: 12/1/2016  © 1261-8820 [employer]. [address] [place] [address]. All rights reserved. This information is not intended as a substitute for professional medical care. Always follow your healthcare professional's instructions.

## 2020-07-27 NOTE — ASSESSMENT & PLAN NOTE
Creatinine from today  1, was 0.84 - Jan 2020   Creatinine was 1- July 2019   Likely cause of renal impairment - Celebrex use         I  suggest monitoring renal function, in put and out put status yeison-operatively. I  suggest avoiding nephrotoxic medication including NSAIDs, COX2 inhibitors, intravenous contrast agent,avoiding hypotension to prevent further renal impairment.

## 2020-07-27 NOTE — ANESTHESIA PAT ROS NOTE
07/27/2020  Lora Goins is a 64 y.o., female.      Pre-op Assessment          Review of Systems  Anesthesia Hx:  No problems with previous Anesthesia  History of prior surgery of interest to airway management or planning: Previous anesthesia: Spinal  Aug 2019 left knee replacement with spinal.  Procedure performed at an [facility]. Denies Family Hx of Anesthesia complications.   Denies Personal Hx of Anesthesia complications.   Social:  Former Smoker, No Alcohol Use    Hematology/Oncology:  Hematology Normal   Oncology Normal     EENT/Dental:   Jaw Problems:  Clicking (TMJ)   Cardiovascular:    Denies Angina. hyperlipidemia  Functional Capacity good / => 4 METS  Chronic Venous Insufficiency, bilateral lower extremity  Hypertension , Well Controlled on Rx , Recent typical clinic B/P of 122/80    Pulmonary:  Pulmonary Normal    Hepatic/GI:  Esophageal / Stomach Disorders Gerd Controlled by chronic antireflux medication.    Musculoskeletal:   Charcot Kayla Tooth Musculoskeletal General/Symptoms: joint stiffness, joint pain. Functional capacity is ambulatory without assistance.  Joint Disease:  Arthritis, Osteoarthritis, knee    Neurological:  Pain , onset is chronic , location of right knee , alleviating factors are Tramadol 2/day. Osteoarthritis, knee        Physical Exam  General:  Well nourished    Airway/Jaw/Neck:  Airway Findings: Mouth Opening: Normal Tongue: Normal  Jaw/Neck Findings:  Neck ROM: Normal ROM      Dental:  Dental Findings: In tact, Lower partial dentures   Chest/Lungs:  Chest/Lungs Findings: Clear to auscultation     Heart/Vascular:  Heart Findings: Rate: Normal  Vascular Findings:  Edema Locations: BLE  Edema: +1 or +2        Mental Status:  Mental Status Findings:  Cooperative, Alert and Oriented         7/27/20 Lab results noted, Covid pending, Dr Cervantes clearance  noted:  Preoperative cardiac risk assessment-  The patient does not have any active cardiac conditions . Revised cardiac risk index predictors-0 ---.Functional capacity is more than 4 Mets. She will be undergoing a Orthopedic procedure that carries a Moderate Risk risk      Risk of a major Cardiac event ( Defined as death, myocardial infarction, or cardiac arrest at 30 days after noncardiac surgery), based on RCRI score      -3.9%            No further cardiac work up is indicated prior to proceeding with the surgery            American Society of Anesthesiologists Physical status classification ( ASA ) class:2     Postoperative pulmonary complication risk assessment:      ARISCAT ( Canet) risk index- risk class -  Low, if duration of surgery is under 3 hours, intermediate, if duration of surgery is over 3 hours

## 2020-07-27 NOTE — ASSESSMENT & PLAN NOTE
Lab from 7/27 /2020 - showed glucose of 161    Not a known Diabetic    I suggest monitoring the glucose in the perioperative period as  glucose may be high from stress hyperglycemia in which case Insulin may be required    Called lab  , added A1c to lab    Likely prediabetes        Weight loss with diet and exercise , if able ,helps lower A1c  Increased risk of becoming a diabetic   Suggested  follow up   --    A1c is 5.7 in the Pre diabetic range    Called and spoke to her  earlier in the night  Pre Diabetes , follow up discussed   Pre Diabetic A1c range of 5.7 - 6.4 discussed

## 2020-07-27 NOTE — ASSESSMENT & PLAN NOTE
Requip   Not known to have sleep apnea  Not known to have low Iron  -     Had a vein procedure on the Rt leg about 6 months to help rest less legs  Was re evaluated and to her understanding no thrombosis    Dr Velazquez      Chart review suggests ablation of Rt Great Saphenous Vein

## 2020-07-27 NOTE — PATIENT INSTRUCTIONS
Tips to Control Acid Reflux    To control acid reflux, youll need to make some basic diet and lifestyle changes. The simple steps outlined below may be all youll need to ease discomfort.  Watch what you eat  · Avoid fatty foods and spicy foods.  · Eat fewer acidic foods, such as citrus and tomato-based foods. These can increase symptoms.  · Limit drinking alcohol, caffeine, and fizzy beverages. All increase acid reflux.  · Try limiting chocolate, peppermint, and spearmint. These can worsen acid reflux in some people.  Watch when you eat  · Avoid lying down for 3 hours after eating.  · Do not snack before going to bed.  Raise your head  Raising your head and upper body by 4 to 6 inches helps limit reflux when youre lying down. Put blocks under the head of your bed frame to raise it.  Other changes  · Lose weight, if you need to  · Dont exercise near bedtime  · Avoid tight-fitting clothes  · Limit aspirin and ibuprofen  · Stop smoking   Date Last Reviewed: 7/1/2016  © 6992-7156 [employer]. [address] [place] [address], [place] [address]. All rights reserved. This information is not intended as a substitute for professional medical care. Always follow your healthcare professional's instructions.

## 2020-07-27 NOTE — PROGRESS NOTES
[facility] - Pre Op Consult  Progress Note    Patient Name: Lora Goins  MRN: [id number]  Date of Evaluation- 07/27/2020  PCP- Shira Stewart NP    Future cases for Lora Goins [[id number]]     Case ID Status Date Time Roland Procedure Provider Location    [id number] [facility] 7/28/2020  2:00  ARTHROPLASTY, KNEE - MARZENA Elkins MD [[id number]] [facility] OR      Rt     HPI:  History of present illness- I had the pleasure of meeting this pleasant 64 y.o. lady in the pre op clinic prior to her elective Orthopedic surgery. The patient is known  to me . Lora was accompanied by  Mr Dasilva.    I have obtained the history by speaking to the patient and by reviewing the electronic health records.    Events leading up to surgery / History of presenting illness -    She has been troubled with moderate-severe Rt knee  pain for the past 1 year  .   Pain increases with activity and decreases with resting.  Hurts at night time also    No previous Rt knee surgeries, injuries, infections    Relevant health conditions of significance for the perioperative period/ History of presenting illness -    Had contralateral knee replacement Aug 2019   Did well      Subjectively describes health as good      Health conditions of significance for the perioperative period       HTN     Maite lopez      Lives with   Flat house  Help available post op     Works for [employer]   Physical work     Not known to have heart disease , Diabetes Mellitus, Lung disease             Subjective/ Objective:       Chief complaint-Preoperative evaluation, Perioperative Medical management, complication reduction plan     Active cardiac conditions- none    Revised cardiac risk index predictors- none    Functional capacity -Examples of physical activity  house work and can take a flight of stairs holding on to the railing----- She can undertake all the above activities without  chest pain,chest tightness, Shortness of breath  ,dizziness,lightheadedness making her exercise tolerance more,   than 4 Mets.       Review of Systems   Constitutional: Negative for chills and fever.        Lost weight by eating better   HENT:        STOPBANG score 3 / 8    Snoring   HTN  Age over 50        Eyes:        No new visual changes   Respiratory:        No cough , phlegm    No Hemoptysis   Cardiovascular:        As noted   Gastrointestinal:        No overt GI/ blood losses  LMP at age 37 Y  Bowel movements- Regular    Endocrine:        Prednisone use > 20 mg daily for 3 weeks- none   Genitourinary: Negative for dysuria.        No urinary hesitancy    Musculoskeletal:        As above      Skin: Negative for rash.   Neurological: Negative for syncope.        No unilateral weakness   Hematological:        Current use of Anticoagulants  none   Psychiatric/Behavioral:        No Depression,Anxiety     No vascular stenting             No anesthesia, bleeding, cardiac problems , PONV with previous surgeries/procedures.  Medications and Allergies reviewed in epic.   Gets cancer screening   FH- No bleeding / venous thrombosis , early onset heart disease in family     Physical Exam      Physical Exam  Constitutional- Vitals - There is no height or weight on file to calculate BMI., There were no vitals filed for this visit.  General appearance-Conscious,Coherent  Eyes- No conjunctival icterus,pupils  round  and reactive to light   ENT-Oral cavity- lower partial denture  , Hearing grossly normal   Neck- No thyromegaly ,Trachea -central, No jugular venous distension,   No Carotid Bruit   Cardiovascular -Heart Sounds- Normal  and  no murmur   , No gallop rhythm   Respiratory - Normal Respiratory Effort, Normal breath sounds,  no wheeze  and  no forced expiratory wheeze    Peripheral pitting pedal edema-- mild, no calf pain   Gastrointestinal -Soft abdomen, No palpable masses, Non Tender,Liver,Spleen not palpable. No-- free fluid and shifting  dullness  Musculoskeletal- No finger Clubbing. Strength grossly normal   Lymphatic-No Palpable cervical, axillary,Inguinal lymphadenopathy   Psychiatric - normal effect,Orientation  Rt Dorsalis pedis pulses-palpable    Lt Dorsalis pedis pulses- palpable   Rt Posterior tibial pulses -palpable   Left posterior tibial pulses -palpable   Miscellaneous -  Surgical scarabdomen - lower abdomen , umbilicus  and  no renal bruit    Investigations  Telemetry   Aug 2019 - Sinus      Review of old records- Was done and information gathered regards to events leading to surgery and health conditions of significance in the perioperative period.        Preoperative cardiac risk assessment-  The patient does not have any active cardiac conditions . Revised cardiac risk index predictors-0 ---.Functional capacity is more than 4 Mets. She will be undergoing a Orthopedic procedure that carries a Moderate Risk risk     Risk of a major Cardiac event ( Defined as death, myocardial infarction, or cardiac arrest at 30 days after noncardiac surgery), based on RCRI score     -3.9%         No further cardiac work up is indicated prior to proceeding with the surgery          American Society of Anesthesiologists Physical status classification ( ASA ) class:2     Postoperative pulmonary complication risk assessment:      ARISCAT ( Canet) risk index- risk class -  Low, if duration of surgery is under 3 hours, intermediate, if duration of surgery is over 3 hours        Assessment/Plan:     CMT (Charcot-Kayla-Tooth disease)  Functional   Stays active at work, home   Neuropathy - takes Gabapentin  Does not ware braces  Mother had CMT and had to wear Braces   Feels that she can use a walker post op     Has shooting pain in both arms, legs from Neuropathy  - long standing   Has difficulty picking herself up - long standing     Not a Diabetic   Not known to have low Vit B12    Feet care suggested     No longer under Neurology care     Peripheral  neuropathy  Charcot Kayla Tooth related   Not a diabetic  Feet care suggested  No open areas of feet     RLS (restless legs syndrome)  Requip   Not known to have sleep apnea  Not known to have low Iron  -     Had a vein procedure on the Rt leg about 6 months to help rest less legs  Was re evaluated and to her understanding no thrombosis    Dr Velazquez      Chart review suggests ablation of Rt Great Saphenous Vein     Chronic, continuous use of opioids  Tramadol use 2 times a day  for about 10 years for Neuropathy      Chronic continuous opioid use- In view of the opioid use, the patient may have opioid tolerance . I suggest considering the possibility of opioid tolerance  in planning post operative pain control      Not known to have seizures - Tramadol can lower seizure threshold        Chronic venous insufficiency  No thrombosis history      Stands 7 hours a day - been working for 38 years     Uses compression stockings- while on her feet     Increased risk of thrombosis in the yeison operative period , compression stocking use discussed    US Sept 2018         Essential hypertension  Amlodipine was reduced from 10 mg to 5 mg due to leg swellings   HCTZ  BP- usually 130/80's     \  Hypertension-  Blood pressure is acceptable . I suggest continuation of Amlodipine  during the entire perioperative period. I suggest holding -HCTZ-- on the morning of the surgery and can continue that  post operatively under blood pressure, electrolyte and renal function monitoring as long as they are acceptable.I suggest addressing pain control as uncontrolled pain can increased blood pressure     Hyperlipidemia  Zetia   Statin intolerance    Obesity (BMI 30-39.9)  Has weight related conditions  - HTN  - HLD  - Acid reflux   - Osteo arthritis      Not known to have fatty liver , type 2 DM     Encouraged weight loss    Elevated bilirubin  Elevated Indirect Bilirubin -   No suggestion of  hepatic decompensation   Not known to have liver  disease      No hemolysis per history      ? Fasting lab related   Likely Gilbert's      No history of cirrhosis of liver or suggestion hepatic decompensation     Labs from Dec 2019     Direct, Indirect Bilirubin- N      GERD (gastroesophageal reflux disease)  Nexium helping   Related to food   No radiation   No associated Sweating, SOB, Nausea, vomiting , diarrhea  Does not sound Cardiac      Tips to control acid reflux discussed   Contributors- Celebrex, Laying down within 3 hours after eating    Fibromyalgia  Fibromyalgia   Helped by Cymbalta  I suggest monitoring the sodium as SIADH from Cymblata  use and hypersecretion of ADH associated with surgery can reduce sodium in the perioperative period    Jaw clicking  No locking or pain   I suggest that the perioperative team be aware of this so that appropriate  care can be taken     Edema  Edema of legs better since reducing Amlodipine , addition of HCTZ     Edema- I suggested avoidance of added salt,avoidance of NSAID's, unless advised or ordered  and suggested Limb elevation and magalis hose use    History of tobacco abuse  15 pack years   Quit in 2003      Not known to have COPD, Bronchitis, Emphysema, wheezing , chronic phlegm   No inhaler, oxygen use     NSAID long-term use  Was on Meloxicam and Vioxx in the past      On Celebrex for a long time   Tried once a day , but had to go back to Twice a day   Holding 1 week pre op      Renal , ulcer effects discussed    Snoring  Possible sleep apnea- I suggest a sleep study and suggest caution with usage of medication that can cause respiratory suppression in the perioperative period     potential ramifications of untreated sleep apnea, which could include daytime sleepiness, hypertension, heart disease and stroke were discussed     Avoidance of  supine sleep, weight gain , alcoholic beverages , care with , sedative , CNS depressant use indicated  since all of these can worsen GOLDIE     She deferred sleep evaluation post op          Weakness  Leg weakness from Charra Garza  Not new  Not known to have stroke    Prediabetes  Lab from 7/27 /2020 - showed glucose of 161    Not a known Diabetic    I suggest monitoring the glucose in the perioperative period as  glucose may be high from stress hyperglycemia in which case Insulin may be required    Called lab  , added A1c to lab    Likely prediabetes        Weight loss with diet and exercise , if able ,helps lower A1c  Increased risk of becoming a diabetic   Suggested  follow up   --    A1c is 5.7 in the Pre diabetic range    Called and spoke to her  earlier in the night  Pre Diabetes , follow up discussed   Pre Diabetic A1c range of 5.7 - 6.4 discussed      Renal impairment  Creatinine from today  1, was 0.84 - Jan 2020   Creatinine was 1- July 2019   Likely cause of renal impairment - Celebrex use         I  suggest monitoring renal function, in put and out put status yeison-operatively. I  suggest avoiding nephrotoxic medication including NSAIDs, COX2 inhibitors, intravenous contrast agent,avoiding hypotension to prevent further renal impairment.         Preventive perioperative care    Thromboembolic prophylaxis:  Her risk factors for thrombosis include surgical procedure and age.I suggest  thromboembolic prophylaxis ( mechanical/pharmacological, weighing the risk benefits of pharmacological agent use considering yeison procedural bleeding )  during the perioperative period.I suggested being active in the post operative period.   The patient is a candidate for extended DVT prophylaxis     Postoperative pulmonary complication prophylaxis-- I suggest incentive spirometry use, early ambulation and end tidal carbon dioxide monitoring  , oral care , head end of bed elevation      Renal complication prophylaxis-Risk factors for renal complications include hypertension . I suggest keeping her well hydrated  in the perioperative period.     Surgical site Infection Prophylaxis-I  suggest  appropriate antibiotic for Prophylaxis against Surgical site infections  No reported Staph infection      Delirium prophylaxis-Risk factors - opioid use - I suggest avoidance / minimizing the use of  Benzodiazepines ( unless the patient has been taking it on a regular basis ),Anticholinergic medication,Antihistamines ( like  Benadryl).I suggest minimizing the use of opioid medication and use of IV tylenol,if it is appropriate. I suggest using the lowest possible dose of opioids for the shortest duration possible in the perioperative period. I suggest to Keep shades/blinds open during the day, lights off and shades closed at night to encourage normal sleep/wake cycle.I encourage the presence of the family member with the patient at all times, if at all possible as mental status changes can be picked up early by the family members and they help with reorientation. I encouraged the presence of family to help with orientation in the perioperative period. Benadryl avoidance suggested      In view of regional anesthesia  the patient  is at risk of postoperative urinary retention.  I suggest avoidance / minimizing the of  Benzodiazepines,Anticholinergic medication,antihistamines ( Benadryl) , if possible in the perioperative period. I suggest using the minimum possible use of opioids for the minimum period of time in the perioperative period. Benadryl avoidance suggested      This visit was focused on Preoperative evaluation, Perioperative Medical management, complication reduction plans. I suggest that the patient follows up with primary care or relevant sub specialists for ongoing health care.    I appreciate the opportunity to be involved in this patients care. Please feel free to contact me if there were any questions about this consultation.    Patient is optimized     Patient  was instructed to call and update me about any changes to health,  medication, office visits ,testing out side of the yeison operative care  "center , hospitalizations between now and surgery     Myrtle Cervantes MD  Perioperative Medicine  [facility]   Pager [phone]    COVID screening     No fever -  No cough -  No SOB-  No sore throat -  No loss of taste or smell -  No new muscle aches -  No nausea, vomiting , diarrhea-  --  7/27- 10 42    Older records reviewed   Jan 2020 - Hb, HCT,PLT- N  INR 0.89   Creatinine - 0.84   Dec 2029 - ALT,AST - N  EKG 9/6/2018- Sinus - no acute appearing changes   -   7/27- 11 30     Looked for Cardiology records with reference to what sounds like a venous procedure she had towards end of 2019/ early 2020 on the Rt lower extremity   It seems that she went back a few days after the procedure  and the ultra sound scan showed no thrombosis  Will obtain records    --  7/27- 13 51  -  7/27- 17 45     Vitals -   Vitals - 1 value per visit 7/27/2020   SYSTOLIC 122   DIASTOLIC 80   PULSE 74   TEMPERATURE 96.7   RESPIRATIONS    SPO2 98   Weight (lb) 165.6   Weight (kg) 75.116   HEIGHT 5' 2"   BODY MASS INDEX 30.29     Out of [facility] records reviewed     Lower extremity Venous US 12/23/2019       EKG 12/11/2019     Sinus     Labs   Hb , HCT, PLT- N  Creatinine 1- was 0,84 - Jan 2020   Glucose 161     Called to discuss labs   Unable to speak   Care with Celebrex ,Follow up about Renal function , glucose suggested     Called to follow up , to address any concerns with the up coming surgery or any questions on Medication instructions   Unable to speak   Left a message to call, if needed   -   Called back   Left a message     Deleterious effects NSAID's , Beneficial effects of Hydration discussed   Tylenol as needed for pain     I  suggest monitoring renal function, in put and out put status yeison-operatively. I  suggest avoiding nephrotoxic medication including NSAIDs, COX2 inhibitors, intravenous contrast agent,avoiding hypotension to prevent further renal impairment.   --  7/27- 19 48     A1c in process   Called " and spoke to her   Glucose this afternoon was after cookies and milk   Not a diabetic     Called to follow up , spoke to her  to address any concerns with the up coming surgery or any questions on Medication instructions -  Doing well ,No changes to Medication, Health -    SARS-CoV2 (COVID-19) Qualitative PCR  Not Detected      ---  7/27- 23 14     A1c is 5.7 in the Pre diabetic range    Called and spoke to her  earlier in the night  Pre Diabetes , follow up discussed   Pre Diabetic A1c range of 5.7 - 6.4 discussed

## 2020-07-27 NOTE — OUTPATIENT SUBJECTIVE & OBJECTIVE
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       Review of Systems   Constitutional: Negative for chills and fever.        Lost weight by eating better   HENT:        STOPBANG score 3 / 8    Snoring   HTN  Age over 50        Eyes:        No new visual changes   Respiratory:        No cough , phlegm    No Hemoptysis   Cardiovascular:        As noted   Gastrointestinal:        No overt GI/ blood losses  LMP at age 37 Y  Bowel movements- Regular    Endocrine:        Prednisone use > 20 mg daily for 3 weeks- none   Genitourinary: Negative for dysuria.        No urinary hesitancy    Musculoskeletal:        As above      Skin: Negative for rash.   Neurological: Negative for syncope.        No unilateral weakness   Hematological:        Current use of Anticoagulants  none   Psychiatric/Behavioral:        No Depression,Anxiety     No vascular stenting             No anesthesia, bleeding, cardiac problems , PONV with previous surgeries/procedures.  Medications and Allergies reviewed in epic.   Gets cancer screening   FH- No bleeding / venous thrombosis , early onset heart disease in family     Physical Exam      Physical Exam  Constitutional- Vitals - There is no height or weight on file to calculate BMI., There were no vitals filed for this visit.  General appearance-Conscious,Coherent  Eyes- No conjunctival icterus,pupils  round  and reactive to light   ENT-Oral cavity- lower partial denture  , Hearing grossly normal   Neck- No thyromegaly  ,Trachea -central, No jugular venous distension,   No Carotid Bruit   Cardiovascular -Heart Sounds- Normal  and  no murmur   , No gallop rhythm   Respiratory - Normal Respiratory Effort, Normal breath sounds,  no wheeze  and  no forced expiratory wheeze    Peripheral pitting pedal edema-- mild, no calf pain   Gastrointestinal -Soft abdomen, No palpable masses, Non Tender,Liver,Spleen not palpable. No-- free fluid and shifting dullness  Musculoskeletal- No finger Clubbing. Strength grossly normal   Lymphatic-No Palpable cervical, axillary,Inguinal lymphadenopathy   Psychiatric - normal effect,Orientation  Rt Dorsalis pedis pulses-palpable    Lt Dorsalis pedis pulses- palpable   Rt Posterior tibial pulses -palpable   Left posterior tibial pulses -palpable   Miscellaneous -  Surgical scarabdomen - lower abdomen , umbilicus  and  no renal bruit    Investigations  Telemetry   Aug 2019 - Sinus      Review of old records- Was done and information gathered regards to events leading to surgery and health conditions of significance in the perioperative period.    Outpatient Subjective & Objective

## 2020-07-27 NOTE — ASSESSMENT & PLAN NOTE
Possible sleep apnea- I suggest a sleep study and suggest caution with usage of medication that can cause respiratory suppression in the perioperative period     potential ramifications of untreated sleep apnea, which could include daytime sleepiness, hypertension, heart disease and stroke were discussed     Avoidance of  supine sleep, weight gain , alcoholic beverages , care with , sedative , CNS depressant use indicated  since all of these can worsen GOLDIE     She deferred sleep evaluation post op

## 2020-07-28 ENCOUNTER — ANESTHESIA (OUTPATIENT)
Dept: SURGERY | Facility: HOSPITAL | Age: 65
DRG: 470 | End: 2020-07-28
Payer: COMMERCIAL

## 2020-07-28 ENCOUNTER — HOSPITAL ENCOUNTER (INPATIENT)
Facility: HOSPITAL | Age: 65
LOS: 1 days | Discharge: HOME | DRG: 470 | End: 2020-07-29
Attending: ORTHOPAEDIC SURGERY | Admitting: ORTHOPAEDIC SURGERY
Payer: COMMERCIAL

## 2020-07-28 DIAGNOSIS — M17.11 RIGHT KNEE DJD: ICD-10-CM

## 2020-07-28 DIAGNOSIS — M17.11 PRIMARY OSTEOARTHRITIS OF RIGHT KNEE: ICD-10-CM

## 2020-07-28 DIAGNOSIS — R73.03 PREDIABETES: ICD-10-CM

## 2020-07-28 DIAGNOSIS — Z96.651 STATUS POST TOTAL RIGHT KNEE REPLACEMENT: Primary | ICD-10-CM

## 2020-07-28 LAB
POCT GLUCOSE: 101 MG/DL (ref 70–110)
POCT GLUCOSE: 120 MG/DL (ref 70–110)
POCT GLUCOSE: 243 MG/DL (ref 70–110)

## 2020-07-28 PROCEDURE — C1713 ANCHOR/SCREW BN/BN,TIS/BN: HCPCS | Performed by: ORTHOPAEDIC SURGERY

## 2020-07-28 PROCEDURE — 94770 HC EXHALED C02 TEST: CPT

## 2020-07-28 PROCEDURE — 27447 PR TOTAL KNEE ARTHROPLASTY: ICD-10-PCS | Mod: AS,RT,COE, | Performed by: PHYSICIAN ASSISTANT

## 2020-07-28 PROCEDURE — 63600175 PHARM REV CODE 636 W HCPCS: Performed by: NURSE PRACTITIONER

## 2020-07-28 PROCEDURE — 94799 UNLISTED PULMONARY SVC/PX: CPT

## 2020-07-28 PROCEDURE — 25000003 PHARM REV CODE 250: Performed by: ORTHOPAEDIC SURGERY

## 2020-07-28 PROCEDURE — 94761 N-INVAS EAR/PLS OXIMETRY MLT: CPT

## 2020-07-28 PROCEDURE — 63600175 PHARM REV CODE 636 W HCPCS: Performed by: NURSE ANESTHETIST, CERTIFIED REGISTERED

## 2020-07-28 PROCEDURE — 25000003 PHARM REV CODE 250: Performed by: NURSE ANESTHETIST, CERTIFIED REGISTERED

## 2020-07-28 PROCEDURE — 64448 PR NERVE BLOCK INJ, ANES/STEROID, FEMORAL, CONT INFUSION, INCL IMAG GUIDANCE: ICD-10-PCS | Mod: 59,RT,COE, | Performed by: ANESTHESIOLOGY

## 2020-07-28 PROCEDURE — 64448 NJX AA&/STRD FEM NRV NFS IMG: CPT | Performed by: ANESTHESIOLOGY

## 2020-07-28 PROCEDURE — 25000003 PHARM REV CODE 250: Performed by: NURSE PRACTITIONER

## 2020-07-28 PROCEDURE — 64448 NJX AA&/STRD FEM NRV NFS IMG: CPT | Mod: 59,RT,COE, | Performed by: ANESTHESIOLOGY

## 2020-07-28 PROCEDURE — 82962 GLUCOSE BLOOD TEST: CPT | Performed by: ORTHOPAEDIC SURGERY

## 2020-07-28 PROCEDURE — 25000003 PHARM REV CODE 250: Performed by: STUDENT IN AN ORGANIZED HEALTH CARE EDUCATION/TRAINING PROGRAM

## 2020-07-28 PROCEDURE — S0028 INJECTION, FAMOTIDINE, 20 MG: HCPCS | Performed by: NURSE ANESTHETIST, CERTIFIED REGISTERED

## 2020-07-28 PROCEDURE — 27447 TOTAL KNEE ARTHROPLASTY: CPT | Mod: AS,RT,COE, | Performed by: PHYSICIAN ASSISTANT

## 2020-07-28 PROCEDURE — 76942 ECHO GUIDE FOR BIOPSY: CPT | Performed by: ANESTHESIOLOGY

## 2020-07-28 PROCEDURE — 97530 THERAPEUTIC ACTIVITIES: CPT

## 2020-07-28 PROCEDURE — C1776 JOINT DEVICE (IMPLANTABLE): HCPCS | Performed by: ORTHOPAEDIC SURGERY

## 2020-07-28 PROCEDURE — D9220A PRA ANESTHESIA: Mod: COE,,, | Performed by: NURSE ANESTHETIST, CERTIFIED REGISTERED

## 2020-07-28 PROCEDURE — 11000001 HC ACUTE MED/SURG PRIVATE ROOM

## 2020-07-28 PROCEDURE — 76942 ECHO GUIDE FOR BIOPSY: CPT | Mod: 26,COE,, | Performed by: ANESTHESIOLOGY

## 2020-07-28 PROCEDURE — 63600175 PHARM REV CODE 636 W HCPCS: Performed by: ANESTHESIOLOGY

## 2020-07-28 PROCEDURE — D9220A PRA ANESTHESIA: Mod: COE,,, | Performed by: ANESTHESIOLOGY

## 2020-07-28 PROCEDURE — 37000008 HC ANESTHESIA 1ST 15 MINUTES: Performed by: ORTHOPAEDIC SURGERY

## 2020-07-28 PROCEDURE — 27201423 OPTIME MED/SURG SUP & DEVICES STERILE SUPPLY: Performed by: ORTHOPAEDIC SURGERY

## 2020-07-28 PROCEDURE — D9220A PRA ANESTHESIA: ICD-10-PCS | Mod: COE,,, | Performed by: ANESTHESIOLOGY

## 2020-07-28 PROCEDURE — 27000221 HC OXYGEN, UP TO 24 HOURS

## 2020-07-28 PROCEDURE — 37000009 HC ANESTHESIA EA ADD 15 MINS: Performed by: ORTHOPAEDIC SURGERY

## 2020-07-28 PROCEDURE — 71000039 HC RECOVERY, EACH ADD'L HOUR: Performed by: ORTHOPAEDIC SURGERY

## 2020-07-28 PROCEDURE — 97165 OT EVAL LOW COMPLEX 30 MIN: CPT

## 2020-07-28 PROCEDURE — 99900035 HC TECH TIME PER 15 MIN (STAT)

## 2020-07-28 PROCEDURE — 27447 TOTAL KNEE ARTHROPLASTY: CPT | Mod: RT,COE,, | Performed by: ORTHOPAEDIC SURGERY

## 2020-07-28 PROCEDURE — 76942 PR U/S GUIDANCE FOR NEEDLE GUIDANCE: ICD-10-PCS | Mod: 26,COE,, | Performed by: ANESTHESIOLOGY

## 2020-07-28 PROCEDURE — 71000033 HC RECOVERY, INTIAL HOUR: Performed by: ORTHOPAEDIC SURGERY

## 2020-07-28 PROCEDURE — 36000711: Performed by: ORTHOPAEDIC SURGERY

## 2020-07-28 PROCEDURE — 36000710: Performed by: ORTHOPAEDIC SURGERY

## 2020-07-28 PROCEDURE — 97116 GAIT TRAINING THERAPY: CPT

## 2020-07-28 PROCEDURE — 27447 PR TOTAL KNEE ARTHROPLASTY: ICD-10-PCS | Mod: RT,COE,, | Performed by: ORTHOPAEDIC SURGERY

## 2020-07-28 PROCEDURE — D9220A PRA ANESTHESIA: ICD-10-PCS | Mod: COE,,, | Performed by: NURSE ANESTHETIST, CERTIFIED REGISTERED

## 2020-07-28 PROCEDURE — 97161 PT EVAL LOW COMPLEX 20 MIN: CPT

## 2020-07-28 DEVICE — CEMENT BONE WHOLE BATCH: Type: IMPLANTABLE DEVICE | Site: KNEE | Status: FUNCTIONAL

## 2020-07-28 DEVICE — COMP FEM POST STAB CEM SZ 3 RT: Type: IMPLANTABLE DEVICE | Site: KNEE | Status: FUNCTIONAL

## 2020-07-28 DEVICE — PATELLA TRIATHLON 29X8 SYMTRC: Type: IMPLANTABLE DEVICE | Site: KNEE | Status: FUNCTIONAL

## 2020-07-28 DEVICE — BASEPLATE TIB CEM PRIM SZ 3: Type: IMPLANTABLE DEVICE | Site: KNEE | Status: FUNCTIONAL

## 2020-07-28 DEVICE — TIBIAL POST STAB SZ 3 9X3 POLY: Type: IMPLANTABLE DEVICE | Site: KNEE | Status: FUNCTIONAL

## 2020-07-28 RX ORDER — PANTOPRAZOLE SODIUM 40 MG/1
40 TABLET, DELAYED RELEASE ORAL DAILY
Status: DISCONTINUED | OUTPATIENT
Start: 2020-07-28 | End: 2020-07-29 | Stop reason: HOSPADM

## 2020-07-28 RX ORDER — OXYCODONE HYDROCHLORIDE 5 MG/1
5 TABLET ORAL
Status: DISCONTINUED | OUTPATIENT
Start: 2020-07-28 | End: 2020-07-29 | Stop reason: HOSPADM

## 2020-07-28 RX ORDER — FENTANYL CITRATE 50 UG/ML
INJECTION, SOLUTION INTRAMUSCULAR; INTRAVENOUS
Status: DISCONTINUED | OUTPATIENT
Start: 2020-07-28 | End: 2020-07-28

## 2020-07-28 RX ORDER — HYDRALAZINE HYDROCHLORIDE 10 MG/1
10 TABLET, FILM COATED ORAL EVERY 6 HOURS PRN
Status: DISCONTINUED | OUTPATIENT
Start: 2020-07-28 | End: 2020-07-29 | Stop reason: HOSPADM

## 2020-07-28 RX ORDER — MIDAZOLAM HYDROCHLORIDE 1 MG/ML
INJECTION, SOLUTION INTRAMUSCULAR; INTRAVENOUS
Status: DISCONTINUED | OUTPATIENT
Start: 2020-07-28 | End: 2020-07-28

## 2020-07-28 RX ORDER — FENTANYL CITRATE 50 UG/ML
25 INJECTION, SOLUTION INTRAMUSCULAR; INTRAVENOUS EVERY 5 MIN PRN
Status: DISCONTINUED | OUTPATIENT
Start: 2020-07-28 | End: 2020-07-28 | Stop reason: HOSPADM

## 2020-07-28 RX ORDER — AMOXICILLIN 250 MG
1 CAPSULE ORAL 2 TIMES DAILY
Status: DISCONTINUED | OUTPATIENT
Start: 2020-07-28 | End: 2020-07-29 | Stop reason: HOSPADM

## 2020-07-28 RX ORDER — PROPOFOL 10 MG/ML
VIAL (ML) INTRAVENOUS CONTINUOUS PRN
Status: DISCONTINUED | OUTPATIENT
Start: 2020-07-28 | End: 2020-07-28

## 2020-07-28 RX ORDER — SODIUM CHLORIDE 0.9 % (FLUSH) 0.9 %
3 SYRINGE (ML) INJECTION
Status: DISCONTINUED | OUTPATIENT
Start: 2020-07-28 | End: 2020-07-28 | Stop reason: HOSPADM

## 2020-07-28 RX ORDER — MUPIROCIN 20 MG/G
1 OINTMENT TOPICAL 2 TIMES DAILY
Status: DISCONTINUED | OUTPATIENT
Start: 2020-07-28 | End: 2020-07-29 | Stop reason: HOSPADM

## 2020-07-28 RX ORDER — CEFAZOLIN SODIUM 1 G/3ML
2 INJECTION, POWDER, FOR SOLUTION INTRAMUSCULAR; INTRAVENOUS
Status: COMPLETED | OUTPATIENT
Start: 2020-07-28 | End: 2020-07-28

## 2020-07-28 RX ORDER — ACETAMINOPHEN 500 MG
1000 TABLET ORAL
Status: COMPLETED | OUTPATIENT
Start: 2020-07-28 | End: 2020-07-28

## 2020-07-28 RX ORDER — LIDOCAINE HYDROCHLORIDE 10 MG/ML
1 INJECTION, SOLUTION EPIDURAL; INFILTRATION; INTRACAUDAL; PERINEURAL
Status: DISCONTINUED | OUTPATIENT
Start: 2020-07-28 | End: 2020-07-28 | Stop reason: HOSPADM

## 2020-07-28 RX ORDER — GLUCAGON 1 MG
1 KIT INJECTION
Status: DISCONTINUED | OUTPATIENT
Start: 2020-07-28 | End: 2020-07-29 | Stop reason: HOSPADM

## 2020-07-28 RX ORDER — IBUPROFEN 200 MG
24 TABLET ORAL
Status: DISCONTINUED | OUTPATIENT
Start: 2020-07-28 | End: 2020-07-29 | Stop reason: HOSPADM

## 2020-07-28 RX ORDER — CELECOXIB 200 MG/1
200 CAPSULE ORAL DAILY
Status: DISCONTINUED | OUTPATIENT
Start: 2020-07-29 | End: 2020-07-29 | Stop reason: HOSPADM

## 2020-07-28 RX ORDER — DEXAMETHASONE SODIUM PHOSPHATE 4 MG/ML
INJECTION, SOLUTION INTRA-ARTICULAR; INTRALESIONAL; INTRAMUSCULAR; INTRAVENOUS; SOFT TISSUE
Status: DISCONTINUED | OUTPATIENT
Start: 2020-07-28 | End: 2020-07-28

## 2020-07-28 RX ORDER — AMLODIPINE BESYLATE 5 MG/1
5 TABLET ORAL DAILY
Status: DISCONTINUED | OUTPATIENT
Start: 2020-07-29 | End: 2020-07-29 | Stop reason: HOSPADM

## 2020-07-28 RX ORDER — MIDAZOLAM HYDROCHLORIDE 1 MG/ML
1 INJECTION INTRAMUSCULAR; INTRAVENOUS EVERY 5 MIN PRN
Status: DISCONTINUED | OUTPATIENT
Start: 2020-07-28 | End: 2020-07-28 | Stop reason: HOSPADM

## 2020-07-28 RX ORDER — SODIUM CHLORIDE 9 MG/ML
INJECTION, SOLUTION INTRAVENOUS
Status: COMPLETED | OUTPATIENT
Start: 2020-07-28 | End: 2020-07-28

## 2020-07-28 RX ORDER — ACETAMINOPHEN 500 MG
1000 TABLET ORAL EVERY 6 HOURS
Status: DISCONTINUED | OUTPATIENT
Start: 2020-07-28 | End: 2020-07-29 | Stop reason: HOSPADM

## 2020-07-28 RX ORDER — POLYETHYLENE GLYCOL 3350 17 G/17G
17 POWDER, FOR SOLUTION ORAL DAILY
Status: DISCONTINUED | OUTPATIENT
Start: 2020-07-29 | End: 2020-07-29 | Stop reason: HOSPADM

## 2020-07-28 RX ORDER — NALOXONE HCL 0.4 MG/ML
0.02 VIAL (ML) INJECTION
Status: DISCONTINUED | OUTPATIENT
Start: 2020-07-28 | End: 2020-07-29 | Stop reason: HOSPADM

## 2020-07-28 RX ORDER — PREGABALIN 75 MG/1
75 CAPSULE ORAL
Status: COMPLETED | OUTPATIENT
Start: 2020-07-28 | End: 2020-07-28

## 2020-07-28 RX ORDER — TALC
6 POWDER (GRAM) TOPICAL NIGHTLY PRN
Status: DISCONTINUED | OUTPATIENT
Start: 2020-07-28 | End: 2020-07-28 | Stop reason: HOSPADM

## 2020-07-28 RX ORDER — FAMOTIDINE 10 MG/ML
INJECTION INTRAVENOUS
Status: DISCONTINUED | OUTPATIENT
Start: 2020-07-28 | End: 2020-07-28

## 2020-07-28 RX ORDER — GABAPENTIN 300 MG/1
300 CAPSULE ORAL 2 TIMES DAILY
Status: DISCONTINUED | OUTPATIENT
Start: 2020-07-28 | End: 2020-07-29 | Stop reason: HOSPADM

## 2020-07-28 RX ORDER — INSULIN ASPART 100 [IU]/ML
1-10 INJECTION, SOLUTION INTRAVENOUS; SUBCUTANEOUS
Status: DISCONTINUED | OUTPATIENT
Start: 2020-07-28 | End: 2020-07-29 | Stop reason: HOSPADM

## 2020-07-28 RX ORDER — HYDROCHLOROTHIAZIDE 25 MG/1
25 TABLET ORAL DAILY
Status: DISCONTINUED | OUTPATIENT
Start: 2020-07-30 | End: 2020-07-29 | Stop reason: HOSPADM

## 2020-07-28 RX ORDER — ONDANSETRON 2 MG/ML
4 INJECTION INTRAMUSCULAR; INTRAVENOUS EVERY 8 HOURS PRN
Status: DISCONTINUED | OUTPATIENT
Start: 2020-07-28 | End: 2020-07-29 | Stop reason: HOSPADM

## 2020-07-28 RX ORDER — MORPHINE SULFATE 2 MG/ML
2 INJECTION, SOLUTION INTRAMUSCULAR; INTRAVENOUS
Status: DISCONTINUED | OUTPATIENT
Start: 2020-07-28 | End: 2020-07-29 | Stop reason: HOSPADM

## 2020-07-28 RX ORDER — ROPIVACAINE HYDROCHLORIDE 2 MG/ML
8 INJECTION, SOLUTION EPIDURAL; INFILTRATION; PERINEURAL CONTINUOUS
Status: DISCONTINUED | OUTPATIENT
Start: 2020-07-28 | End: 2020-07-28

## 2020-07-28 RX ORDER — VANCOMYCIN HCL IN 5 % DEXTROSE 1G/250ML
1000 PLASTIC BAG, INJECTION (ML) INTRAVENOUS
Status: COMPLETED | OUTPATIENT
Start: 2020-07-28 | End: 2020-07-28

## 2020-07-28 RX ORDER — CELECOXIB 200 MG/1
400 CAPSULE ORAL
Status: COMPLETED | OUTPATIENT
Start: 2020-07-28 | End: 2020-07-28

## 2020-07-28 RX ORDER — ASPIRIN 81 MG/1
81 TABLET ORAL 2 TIMES DAILY
Status: DISCONTINUED | OUTPATIENT
Start: 2020-07-28 | End: 2020-07-29 | Stop reason: HOSPADM

## 2020-07-28 RX ORDER — SODIUM CHLORIDE 0.9 % (FLUSH) 0.9 %
10 SYRINGE (ML) INJECTION
Status: DISCONTINUED | OUTPATIENT
Start: 2020-07-28 | End: 2020-07-28 | Stop reason: HOSPADM

## 2020-07-28 RX ORDER — ONDANSETRON 2 MG/ML
INJECTION INTRAMUSCULAR; INTRAVENOUS
Status: DISCONTINUED | OUTPATIENT
Start: 2020-07-28 | End: 2020-07-28

## 2020-07-28 RX ORDER — BISACODYL 10 MG
10 SUPPOSITORY, RECTAL RECTAL EVERY 12 HOURS PRN
Status: DISCONTINUED | OUTPATIENT
Start: 2020-07-28 | End: 2020-07-29 | Stop reason: HOSPADM

## 2020-07-28 RX ORDER — DULOXETIN HYDROCHLORIDE 30 MG/1
30 CAPSULE, DELAYED RELEASE ORAL DAILY
Status: DISCONTINUED | OUTPATIENT
Start: 2020-07-29 | End: 2020-07-29 | Stop reason: HOSPADM

## 2020-07-28 RX ORDER — LIDOCAINE HYDROCHLORIDE 20 MG/ML
INJECTION INTRAVENOUS
Status: DISCONTINUED | OUTPATIENT
Start: 2020-07-28 | End: 2020-07-28

## 2020-07-28 RX ORDER — ROPINIROLE 2 MG/1
4 TABLET, FILM COATED ORAL NIGHTLY
Status: DISCONTINUED | OUTPATIENT
Start: 2020-07-28 | End: 2020-07-29 | Stop reason: HOSPADM

## 2020-07-28 RX ORDER — PROPOFOL 10 MG/ML
VIAL (ML) INTRAVENOUS
Status: DISCONTINUED | OUTPATIENT
Start: 2020-07-28 | End: 2020-07-28

## 2020-07-28 RX ORDER — EZETIMIBE 10 MG/1
10 TABLET ORAL NIGHTLY
Status: DISCONTINUED | OUTPATIENT
Start: 2020-07-28 | End: 2020-07-29 | Stop reason: HOSPADM

## 2020-07-28 RX ORDER — ROPIVACAINE HYDROCHLORIDE 2 MG/ML
8 INJECTION, SOLUTION EPIDURAL; INFILTRATION; PERINEURAL
Status: DISCONTINUED | OUTPATIENT
Start: 2020-07-28 | End: 2020-07-29 | Stop reason: HOSPADM

## 2020-07-28 RX ORDER — MUPIROCIN 20 MG/G
1 OINTMENT TOPICAL
Status: COMPLETED | OUTPATIENT
Start: 2020-07-28 | End: 2020-07-28

## 2020-07-28 RX ORDER — SODIUM CHLORIDE 9 MG/ML
INJECTION, SOLUTION INTRAVENOUS CONTINUOUS
Status: ACTIVE | OUTPATIENT
Start: 2020-07-28 | End: 2020-07-29

## 2020-07-28 RX ORDER — OXYCODONE HYDROCHLORIDE 10 MG/1
10 TABLET ORAL
Status: DISCONTINUED | OUTPATIENT
Start: 2020-07-28 | End: 2020-07-29 | Stop reason: HOSPADM

## 2020-07-28 RX ORDER — IBUPROFEN 200 MG
16 TABLET ORAL
Status: DISCONTINUED | OUTPATIENT
Start: 2020-07-28 | End: 2020-07-29 | Stop reason: HOSPADM

## 2020-07-28 RX ORDER — ROPIVACAINE HYDROCHLORIDE 2 MG/ML
6 INJECTION, SOLUTION EPIDURAL; INFILTRATION; PERINEURAL CONTINUOUS
Status: DISCONTINUED | OUTPATIENT
Start: 2020-07-28 | End: 2020-07-29 | Stop reason: HOSPADM

## 2020-07-28 RX ADMIN — TRANEXAMIC ACID 1000 MG: 100 INJECTION, SOLUTION INTRAVENOUS at 07:07

## 2020-07-28 RX ADMIN — LIDOCAINE HYDROCHLORIDE 50 MG: 20 INJECTION, SOLUTION INTRAVENOUS at 07:07

## 2020-07-28 RX ADMIN — MIDAZOLAM HYDROCHLORIDE 2 MG: 1 INJECTION, SOLUTION INTRAMUSCULAR; INTRAVENOUS at 07:07

## 2020-07-28 RX ADMIN — ACETAMINOPHEN 1000 MG: 500 TABLET ORAL at 05:07

## 2020-07-28 RX ADMIN — EZETIMIBE 10 MG: 10 TABLET ORAL at 08:07

## 2020-07-28 RX ADMIN — MUPIROCIN 1 G: 20 OINTMENT TOPICAL at 08:07

## 2020-07-28 RX ADMIN — SODIUM CHLORIDE, SODIUM GLUCONATE, SODIUM ACETATE, POTASSIUM CHLORIDE, MAGNESIUM CHLORIDE, SODIUM PHOSPHATE, DIBASIC, AND POTASSIUM PHOSPHATE: .53; .5; .37; .037; .03; .012; .00082 INJECTION, SOLUTION INTRAVENOUS at 08:07

## 2020-07-28 RX ADMIN — FENTANYL CITRATE 50 MCG: 50 INJECTION, SOLUTION INTRAMUSCULAR; INTRAVENOUS at 07:07

## 2020-07-28 RX ADMIN — SODIUM CHLORIDE: 0.9 INJECTION, SOLUTION INTRAVENOUS at 10:07

## 2020-07-28 RX ADMIN — GABAPENTIN 300 MG: 300 CAPSULE ORAL at 08:07

## 2020-07-28 RX ADMIN — DOCUSATE SODIUM 50MG AND SENNOSIDES 8.6MG 1 TABLET: 8.6; 5 TABLET, FILM COATED ORAL at 11:07

## 2020-07-28 RX ADMIN — CELECOXIB 400 MG: 200 CAPSULE ORAL at 06:07

## 2020-07-28 RX ADMIN — ROPINIROLE HYDROCHLORIDE 4 MG: 2 TABLET, FILM COATED ORAL at 08:07

## 2020-07-28 RX ADMIN — CEFAZOLIN 2 G: 330 INJECTION, POWDER, FOR SOLUTION INTRAMUSCULAR; INTRAVENOUS at 11:07

## 2020-07-28 RX ADMIN — ACETAMINOPHEN 1000 MG: 500 TABLET ORAL at 11:07

## 2020-07-28 RX ADMIN — ACETAMINOPHEN 1000 MG: 500 TABLET ORAL at 06:07

## 2020-07-28 RX ADMIN — ASPIRIN 81 MG: 81 TABLET, COATED ORAL at 08:07

## 2020-07-28 RX ADMIN — ONDANSETRON 4 MG: 2 INJECTION INTRAMUSCULAR; INTRAVENOUS at 07:07

## 2020-07-28 RX ADMIN — OXYCODONE HYDROCHLORIDE 10 MG: 10 TABLET ORAL at 05:07

## 2020-07-28 RX ADMIN — TRANEXAMIC ACID 1000 MG: 100 INJECTION, SOLUTION INTRAVENOUS at 08:07

## 2020-07-28 RX ADMIN — ROPIVACAINE HYDROCHLORIDE 4 ML/HR: 2 INJECTION, SOLUTION EPIDURAL; INFILTRATION at 09:07

## 2020-07-28 RX ADMIN — SODIUM CHLORIDE: 0.9 INJECTION, SOLUTION INTRAVENOUS at 06:07

## 2020-07-28 RX ADMIN — DOCUSATE SODIUM 50MG AND SENNOSIDES 8.6MG 1 TABLET: 8.6; 5 TABLET, FILM COATED ORAL at 08:07

## 2020-07-28 RX ADMIN — MIDAZOLAM 2 MG: 1 INJECTION INTRAMUSCULAR; INTRAVENOUS at 06:07

## 2020-07-28 RX ADMIN — FAMOTIDINE 20 MG: 10 INJECTION, SOLUTION INTRAVENOUS at 07:07

## 2020-07-28 RX ADMIN — MORPHINE SULFATE 2 MG: 2 INJECTION, SOLUTION INTRAMUSCULAR; INTRAVENOUS at 07:07

## 2020-07-28 RX ADMIN — MORPHINE SULFATE 2 MG: 2 INJECTION, SOLUTION INTRAMUSCULAR; INTRAVENOUS at 11:07

## 2020-07-28 RX ADMIN — MUPIROCIN 1 G: 20 OINTMENT TOPICAL at 06:07

## 2020-07-28 RX ADMIN — OXYCODONE HYDROCHLORIDE 5 MG: 5 TABLET ORAL at 10:07

## 2020-07-28 RX ADMIN — PREGABALIN 75 MG: 75 CAPSULE ORAL at 06:07

## 2020-07-28 RX ADMIN — FENTANYL CITRATE 25 MCG: 50 INJECTION INTRAMUSCULAR; INTRAVENOUS at 06:07

## 2020-07-28 RX ADMIN — DEXAMETHASONE SODIUM PHOSPHATE 8 MG: 4 INJECTION, SOLUTION INTRAMUSCULAR; INTRAVENOUS at 07:07

## 2020-07-28 RX ADMIN — OXYCODONE HYDROCHLORIDE 10 MG: 10 TABLET ORAL at 01:07

## 2020-07-28 RX ADMIN — OXYCODONE HYDROCHLORIDE 10 MG: 10 TABLET ORAL at 11:07

## 2020-07-28 RX ADMIN — VANCOMYCIN HYDROCHLORIDE 1000 MG: 1 INJECTION, POWDER, LYOPHILIZED, FOR SOLUTION INTRAVENOUS at 07:07

## 2020-07-28 RX ADMIN — CEFAZOLIN 2 G: 330 INJECTION, POWDER, FOR SOLUTION INTRAMUSCULAR; INTRAVENOUS at 07:07

## 2020-07-28 RX ADMIN — PROPOFOL 50 MG: 10 INJECTION, EMULSION INTRAVENOUS at 07:07

## 2020-07-28 RX ADMIN — PROPOFOL 50 MCG/KG/MIN: 10 INJECTION, EMULSION INTRAVENOUS at 07:07

## 2020-07-28 RX ADMIN — CEFAZOLIN 2 G: 330 INJECTION, POWDER, FOR SOLUTION INTRAMUSCULAR; INTRAVENOUS at 03:07

## 2020-07-28 NOTE — ANESTHESIA PREPROCEDURE EVALUATION
07/28/2020  Lora Goins is a 64 y.o., female.      Anesthesia Evaluation    I have reviewed the Patient Summary Reports.    I have reviewed the Nursing Notes. I have reviewed the NPO Status.      Review of Systems  Anesthesia Hx:  No problems with previous Anesthesia  History of prior surgery of interest to airway management or planning: Previous anesthesia: Spinal  Aug 2019 left knee replacement with spinal.  Procedure performed at an [facility]. Denies Family Hx of Anesthesia complications.   Denies Personal Hx of Anesthesia complications.   Social:  Former Smoker, No Alcohol Use    Hematology/Oncology:  Hematology Normal   Oncology Normal     EENT/Dental:   Jaw Problems:  Clicking (TMJ)   Cardiovascular:    Denies Angina. hyperlipidemia  Functional Capacity good / => 4 METS  Chronic Venous Insufficiency, bilateral lower extremity  Hypertension , Well Controlled on Rx , Recent typical clinic B/P of 122/80    Pulmonary:  Pulmonary Normal    Hepatic/GI:  Esophageal / Stomach Disorders Gerd Controlled by chronic antireflux medication.    Musculoskeletal:   Charcot Kayla Tooth Musculoskeletal General/Symptoms: joint stiffness, joint pain. Functional capacity is ambulatory without assistance.  Joint Disease:  Arthritis, Osteoarthritis, knee    Neurological:  Pain , onset is chronic , location of right knee , alleviating factors are Tramadol 2/day. Osteoarthritis, knee        Physical Exam  General:  Well nourished    Airway/Jaw/Neck:  Airway Findings: Mouth Opening: Normal Tongue: Normal  Jaw/Neck Findings:  Neck ROM: Normal ROM      Dental:  Dental Findings: In tact, Lower partial dentures   Chest/Lungs:  Chest/Lungs Findings: Clear to auscultation     Heart/Vascular:  Heart Findings: Rate: Normal  Vascular Findings:  Edema Locations: BLE  Edema: +1 or +2        Mental Status:  Mental Status Findings:   Cooperative, Alert and Oriented         7/27/20 Lab results noted, Covid pending, Dr Cervantes clearance noted:  Preoperative cardiac risk assessment-  The patient does not have any active cardiac conditions . Revised cardiac risk index predictors-0 ---.Functional capacity is more than 4 Mets. She will be undergoing a Orthopedic procedure that carries a Moderate Risk risk      Risk of a major Cardiac event ( Defined as death, myocardial infarction, or cardiac arrest at 30 days after noncardiac surgery), based on RCRI score      -3.9%            No further cardiac work up is indicated prior to proceeding with the surgery            American Society of Anesthesiologists Physical status classification ( ASA ) class:2     Postoperative pulmonary complication risk assessment:      ARISCAT ( Canet) risk index- risk class -  Low, if duration of surgery is under 3 hours, intermediate, if duration of surgery is over 3 hours          Anesthesia Plan  Type of Anesthesia, risks & benefits discussed:  Anesthesia Type:  CSE, spinal, regional, general  Patient's Preference:   Intra-op Monitoring Plan: standard ASA monitors  Intra-op Monitoring Plan Comments:   Post Op Pain Control Plan: peripheral nerve block, multimodal analgesia and IV/PO Opioids PRN  Post Op Pain Control Plan Comments:   Induction:    Beta Blocker:  Patient is not currently on a Beta-Blocker (No further documentation required).       Informed Consent: Patient understands risks and agrees with Anesthesia plan.  Questions answered. Anesthesia consent signed with patient.  ASA Score: 2     Day of Surgery Review of History & Physical:    H&P update referred to the surgeon.         Ready For Surgery From Anesthesia Perspective.

## 2020-07-28 NOTE — ASSESSMENT & PLAN NOTE
Home meds   Type 2 diabetes mellitus with stage 3 chronic kidney disease, without long-term current use of insulin

## 2020-07-28 NOTE — PLAN OF CARE
Patient transferred to room 318, at a tolerable pain level, all belongings sent with patient and patient's  is aware of transfer.  PIV and perinueral catheter secured for transfer in bed via PCT.  All questions answered to receiving nurse.

## 2020-07-28 NOTE — OP NOTE
DATE OF PROCEDURE: 7/28/2020  PREOPERATIVE DIAGNOSIS: Arthritis, Right knee.   POSTOPERATIVE DIAGNOSIS: Arthritis, Right knee.   PROCEDURES PERFORMED: Right total knee arthroplasty.   SURGEON: Chapito Elkins M.D.   ASSISTANT: Caryn Gardiner PA-C (due to the fact that there was no available   qualified resident, Physician's Assistant Caryn Gardiner acted as first   assistant during this case).     ANESTHESIA: Regional.   COMPLICATIONS: None.   COUNTS: Correct.   DISPOSITION: Recovery Room, stable.   SPECIMENS: Bone and cartilage.   FINDINGS: Tricompartmental degenerative change.   FLUIDS: 2000 mL.   BLOOD LOSS: Less than 50 mL.   IMPLANTS: [place] Triathlon, size 3 Right posterior stabilized   cemented femoral component with a 3 cemented tibial tray, a 29 mm 3-peg   patella and a size 3, 9 mm posterior stabilized polyethylene insert.   INDICATIONS FOR PROCEDURE: Lora Goins is a 64-year-old female who has   severe arthritis in the Right knee . She is having continued pain in the   Right knee and did not respond to nonoperative measures, decided to undergo   Right knee replacement. She is aware of reasonable treatment options as   well as risks and benefits. {She did not respond to NSAIDS or injections. She received a course or pre-operative physical therapy.  PROCEDURE IN DETAIL: After appropriate consent was obtained, the patient   Was brought in the Operating Room, anesthesia was administered. She received   antibiotic prophylaxis. Cast   padding and tourniquet was applied to the proximal Right thigh. Right  lower extremity was prepped and draped in usual sterile fashion. Limb was   elevated, tourniquet was inflated. The knee was flexed. An incision was   made from the tibial tubercle just proximal to the superior pole of the   patella. It was taken down through the skin and retinacular. A medial   parapatellar arthrotomy was performed followed by a standard medial   release. Patellar fat pad was partially  excised. ACL was resected.   Femoral punch was used to make the guide hole for the femoral drill. The   distal cutting guide was set at 6 degrees valgus right.A standard distal femoral cut was made.We were pleased with the alignment and quality of the cut.  The PCL retractor was used to bring   the tibia into the field. Meniscal remnants were resected. The   extramedullary tibial guide was pinned in place using the lateral side, as most   defective, 2 mm bone was taken off of this. We checked the alignment in   both planes both before and after making the cut. We were satisfied with the   alignment of the cut and the amount of bone removed.The femur was then  sized, found to be a size 3. A size 3 cutting guide was pinned in 3   degrees of external rotation. This was based off the posterior condyle,   checked the transepicondylar axis. Anterior, posterior and chamfer cuts   were made. The box guide was pinned in position. Femoral box cut was   made. Bone fragments were removed. Lamina spreaders were   then used to open up posteriorly. The PCL was resected and some soft   tissue debris was removed.  A 9 mm spacer block gave excellent flexion-extension gap balance.   I was also satisfied with the medial and lateral stability. At this   point, the femoral trial was placed. The tibia was sized, found to be a   Size 3. A size 3 tibial trial was pinned in appropriate alignment and   rotation. This was based on the medial one third of the tibial tubercle.  A stem extension hole was drilled. A keel hole was punched and a   trial reduction was performed with a size 3, 9 mm insert. She  achieved full   extension. There was no recurvatum. She easily had  130 degrees of flexion.   The femoral component ranged symmetrically in the tibial tray. There was   no lift off. There was no instability of full extension, 30 degrees of   flexion, mid flexion and full flexion. Patella was measured and found to   be 24 mm thick, 8 mm of bone  removed. The 3-peg holes were drilled 29 mm   3-peg trial was placed. The knee was brought through range of motion. The   patella tracked extremely well. Therefore, at this point, we were   satisfied with knee range of motion and stability, component position,   sizing and alignment as well as patella tracking. All trial components   were removed. Bone was prepared for cementing by pulsatile lavage and   drying. Components were cemented into place, femur followed by tibia.   Meticulous care was taken to remove excess cement. Tibial insert was   firmly seated in the tibial tray. The knee was inspected. There was no   loose body, foreign body or soft tissue interposition. Knee was then   reduced, brought into extension. Patella button was cemented in place.   Excess cement was removed. The wound was then copiously irrigated with   antibiotic-impregnated solution. Once the cement was dried, tranexamic   acid was applied. The arthrotomy was closed with #1 Vicryl. Once the   arthrotomy was closed, the knee was brought through range of motion, stable   as previously described. Patella tracked very well. Retinacular was   closed with 0 Vicryl, subcutaneous layer with 2-0 Vicryl, skin with   stratofix and dermabond. Sterile dressing was applied. Tourniquet was let down.  She was   transferred to a stretcher, brought to Recovery Room in stable condition,   tolerated the procedure well, no known complications.

## 2020-07-28 NOTE — PLAN OF CARE
Pre op complete. Questions answered. Resting comfortably. Call light in reach. Dr rodriguez aware of GFR result. Instructed to give Celebrex

## 2020-07-28 NOTE — OPERATIVE NOTE ADDENDUM
Certification of Assistant at Surgery       Surgery Date: 7/28/2020     Participating Surgeons:  Surgeon(s) and Role:     * Chapito Elkins MD - Primary    Procedures:  Procedure(s) (LRB):  ARTHROPLASTY, KNEE - [facility] WHITTINGTON (Right)    Assistant Surgeon's Certification of Necessity:  I understand that section 1842 (b) (6) (d) of the Social Security Act generally prohibits Medicare Part B reasonable charge payment for the services of assistants at surgery in teaching hospitals when qualified residents are available to furnish such services. I certify that the services for which payment is claimed were medically necessary, and that no qualified resident was available to perform the services. I further understand that these services are subject to post-payment review by the Medicare carrier.      Caryn Gardiner PA-C    07/28/2020  9:10 AM

## 2020-07-28 NOTE — PLAN OF CARE
Patient tolerated PT session fairly well. She ambulated 100ft with RW and CGA. She is okay to ambulate with nursing staff assistance and RW.     Problem: Physical Therapy Goal  Goal: Physical Therapy Goal  Description: Goals to be met by: 2020    Patient will increase functional independence with mobility by performin. Supine to sit with supervision  2. Sit to stand transfer with Supervision  3. Gait x400 feet with Supervision using Rolling Walker  4. Ascend/Descend 6 inch curb step with Stand-by Assistance using Rolling Walker  5. Lower extremity exercise program x30 reps per handout, with supervision    Outcome: Ongoing, Progressing

## 2020-07-28 NOTE — PT/OT/SLP EVAL
Physical Therapy Evaluation and Treatment    Patient Name:  Lora Goins   MRN:  [id number]    Recommendations:     Discharge Recommendations:  outpatient PT(7/30/2020)   Discharge Equipment Recommendations: bedside commode   Barriers to discharge: patient is going to stay in the [facility] for 1 week prior to going home    Assessment:     Lora Goins is a 64 y.o. female admitted with a medical diagnosis of Primary osteoarthritis of right knee.  She presents with the following impairments/functional limitations:  weakness, impaired functional mobilty, gait instability, impaired balance, decreased lower extremity function, pain, decreased ROM, impaired skin, orthopedic precautions. Patient tolerated PT session fairly well. She ambulated 100ft with RW and CGA. She is okay to ambulate with nursing staff assistance and RW.     Rehab Prognosis: Good; patient would benefit from acute skilled PT services to address these deficits and reach maximum level of function.    Recent Surgery: Procedure(s) (LRB):  ARTHROPLASTY, KNEE - [facility] (Right) Day of Surgery    Plan:     During this hospitalization, patient to be seen daily to address the identified rehab impairments via gait training, therapeutic activities, therapeutic exercises and progress toward the following goals:    · Plan of Care Expires:  07/31/20    Subjective     Chief Complaint: Pain in right knee. Also, she said this is different from her left knee in August 2019 when she did not have any pain.   Patient/Family Comments/goals: To walk without pain.  Pain/Comfort:  · Pain Rating 1: 5/10  · Location - Side 1: Right  · Location - Orientation 1: anterior  · Location 1: knee  · Pain Addressed 1: Pre-medicate for activity, Distraction, Cessation of Activity, Nurse notified    Living Environment:  Patient lives with her  in a [facility] with 1 threshold to enter. She had her L TKA in August 2019 and had no difficulties during that recovery. Prior to admission,  patients level of function was independent for functional mobility. She works as a  for [employer]. Equipment used at home: walker, rolling, shower chair.  Upon discharge, patient will have assistance from .    Objective:     Communicated with RN prior to session.  Patient found supine in bed with cryotherapy, FCD, perineural catheter, peripheral IV  upon PT entry to room.  present during PT session.     General Precautions: Standard, fall   Orthopedic Precautions:RLE weight bearing as tolerated   Braces: N/A     Exams:  · Cognitive Exam:  Patient is oriented to Person, Place, Time and Situation  · Sensation:    · -       Intact  · RLE ROM: WFL at hip and ankle but limited at knee due to pain  · RLE Strength: grossly 3+/5 but did not formally assess due to pain  · LLE ROM: WFL  · LLE Strength: WFL    Functional Mobility:  · Bed Mobility:     · Scooting: stand by assistance  · Supine to Sit: stand by assistance  · Transfers:     · Sit to Stand:  contact guard assistance with rolling walker x1 from bed with verbal cues for hand placement   · Gait:  Patient ambulated 100ft with Rolling Walker and CGA using 3-point gait. Patient demonstrated decreased jose j and decreased step length during gait due to pain, decreased ROM and decreased strength. Verbal cues provided for gait sequence and RW management.     Therapeutic Activities and Exercises:  Patient educated in:  -PT role and POC  -safety with transfers including hand placement  -gait sequencing and RW management  -OOB activity to maximize recovery including ambulating with nursing staff assistance and RW    AM-PAC 6 CLICK MOBILITY  Total Score:21     Patient left up in chair with all lines intact, call button in reach, RN notified and  present. Dr Elkins present during part of PT session.     GOALS:   Multidisciplinary Problems     Physical Therapy Goals        Problem: Physical Therapy Goal    Goal Priority Disciplines Outcome Goal  Variances Interventions   Physical Therapy Goal     PT, PT/OT Ongoing, Progressing     Description: Goals to be met by: 2020    Patient will increase functional independence with mobility by performin. Supine to sit with supervision  2. Sit to stand transfer with Supervision  3. Gait x400 feet with Supervision using Rolling Walker  4. Ascend/Descend 6 inch curb step with Stand-by Assistance using Rolling Walker  5. Lower extremity exercise program x30 reps per handout, with supervision                     History:     Past Medical History:   Diagnosis Date    Arthritis     Charcot Kayla Tooth muscular atrophy     Fibromyalgia     GERD (gastroesophageal reflux disease)     Hyperlipidemia     Hypertension     Hypertension     Restless leg syndrome        Past Surgical History:   Procedure Laterality Date     SECTION      2     SECTION      FOOT SURGERY      JOINT REPLACEMENT      LITHOTRIPSY      around      REMOVAL OF PLANTAR WART USING LASER      SINUS SURGERY      2012    TONSILLECTOMY      TOTAL KNEE ARTHROPLASTY Left 2019    Procedure: ARTHROPLASTY, KNEE, TOTAL-[facility];  Surgeon: Chapito Elkins MD;  Location: [facility] OR [address] [place] [address];  Service: Orthopedics;  Laterality: Left;       Time Tracking:     PT Received On: 20  PT Start Time: 1340     PT Stop Time: 1356  PT Total Time (min): 16 min     Billable Minutes: Evaluation  8 and Gait Training 8      Kate Bailey, PT  2020

## 2020-07-28 NOTE — ASSESSMENT & PLAN NOTE
64 y.o. female s/p R TKA 7/28/20    VS:  stable  Nerve block:  Adductor PNC, periop spinal  PT/OT:  WBAT  DVT PPx:  ASA 81 BID  Cultures:  non  Abx:  Postop Ancef  Labs:  none  Drain:  none  Clark:  none    Dispo: likely home today; OP PT set up 7/30/20

## 2020-07-28 NOTE — ANESTHESIA PROCEDURE NOTES
Adductor Canal Catheter    Patient location during procedure: pre-op   Block not for primary anesthetic.  Reason for block: at surgeon's request and post-op pain management   Post-op Pain Location: right knee pain  Start time: 7/28/2020 6:53 AM  Timeout: 7/28/2020 6:50 AM   End time: 7/28/2020 7:05 AM    Staffing  Authorizing Provider: Jenifer David MD  Performing Provider: Jenifer David MD    Preanesthetic Checklist  Completed: patient identified, site marked, surgical consent, pre-op evaluation, timeout performed, IV checked, risks and benefits discussed and monitors and equipment checked  Peripheral Block  Patient position: supine  Prep: ChloraPrep and site prepped and draped  Patient monitoring: heart rate, cardiac monitor, continuous pulse ox, continuous capnometry and frequent blood pressure checks  Block type: adductor canal  Laterality: right  Injection technique: continuous  Needle  Needle type: Tuohy   Needle gauge: 17 G  Needle length: 3.5 in  Needle localization: anatomical landmarks and ultrasound guidance  Catheter type: spring wound  Catheter size: 19 G  Test dose: lidocaine 1.5% with Epi 1-to-200,000 and negative   -ultrasound image captured on disc.  Assessment  Injection assessment: negative aspiration, negative parasthesia and local visualized surrounding nerve  Paresthesia pain: none  Heart rate change: no  Slow fractionated injection: yes  Additional Notes  VSS.  [facility] RN monitoring vitals throughout procedure.  Patient tolerated procedure well.     15 cc of 0.25% ropivacaine with 1/300 k epi used as local

## 2020-07-28 NOTE — PT/OT/SLP EVAL
"Occupational Therapy   Evaluation    Name: Lora Goins  MRN: [id number]  Admitting Diagnosis:  Primary osteoarthritis of right knee Day of Surgery    Recommendations:     Discharge Recommendations: home  Discharge Equipment Recommendations:  bedside commode  Barriers to discharge:       Assessment:     Lora Goins is a 64 y.o. female with a medical diagnosis of Primary osteoarthritis of right knee.  Patient is s/p right TKA. Patient found ambulating with nursing from bathroom. She completed chair transfer at stand by assistance. She would benefit from skilled OT needs s/p right TKA to increase independence with ADLs and ADL transfers.  Performance deficits affecting function: weakness, impaired self care skills, impaired functional mobilty, gait instability, impaired balance, decreased lower extremity function, decreased ROM, orthopedic precautions.      Rehab Prognosis: Good; patient would benefit from acute skilled OT services to address these deficits and reach maximum level of function.       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  Patient found walking with nursing  with cryotherapy, FCD, perineural catheter, peripheral IV upon OT entry to room.    General Precautions: Standard, fall   Orthopedic Precautions:RLE weight bearing as tolerated   Braces: N/A     Occupational Performance:    Functional Mobility/Transfers:  · Patient completed Sit <> Stand Transfer with supervision  with  rolling walker x 1 bout from chair  · Patient completed chair transfer from step transfer with rolling walker with stand by assistance  · Functional Mobility: patient ambulated in room with rolling walker at supervision     Cognitive/Visual Perceptual:  Cognitive/Psychosocial Skills:     -       Oriented to: Person, Place and Time   -       Follows Commands/attention:Follows multistep  commands    Physical Exam:  Upper Extremity Range of Motion:     -       Right Upper Extremity: WFL  -       Left Upper Extremity: WFL  Upper Extremity Strength:    -       Right Upper Extremity: WFL  -       Left Upper Extremity: WFL    AMPAC 6 Click ADL:  AMPAC Total Score: 19    Treatment & Education:  -Patient educated on hand placement for transfers   -Patient educated on rolling walker placement for ADLs  -Patient educated on importance of sitting in chair vs supine in bed   -Patient educated on polar ice use  -Patient educated on role of OT and plan of care   Education:    Patient left up in chair with all lines intact, call button in reach, RN notified and  present    GOALS:   Multidisciplinary Problems     Occupational Therapy Goals        Problem: Occupational Therapy Goal    Goal Priority Disciplines Outcome Interventions   Occupational Therapy Goal     OT, PT/OT Ongoing, Progressing    Description: Goals to be met by: 7-31-20    Patient will increase functional independence with ADLs by performing:    UE Dressing  with Modified [place].  LE Dressing with Supervision.  Grooming while standing at sink with Modified [place].  Toileting from toilet with Modified [place] for hygiene and clothing management.   Toilet transfer to toilet with Supervision.                     History:     Past Medical History:   Diagnosis Date    Arthritis     Charcot Kayla Tooth muscular atrophy     Fibromyalgia     GERD (gastroesophageal reflux disease)     Hyperlipidemia     Hypertension     Hypertension     Restless leg syndrome        Past Surgical History:   Procedure Laterality Date     SECTION      2     SECTION      FOOT SURGERY      JOINT REPLACEMENT      LITHOTRIPSY      around      REMOVAL OF PLANTAR WART USING LASER      SINUS SURGERY      2012    TONSILLECTOMY      TOTAL KNEE ARTHROPLASTY Left 2019    Procedure: ARTHROPLASTY, KNEE, TOTAL-[employer] [facility];  Surgeon: Chapito Elkins MD;  Location: [facility] OR [address] [place] [address];  Service: Orthopedics;  Laterality: Left;       Time Tracking:     OT Date of Treatment: 20  OT Start Time: 1504  OT Stop Time: 1520  OT Total Time (min): 16 min    Billable Minutes:Evaluation 8  Therapeutic Activity 8    Herlinda Scott OT  2020

## 2020-07-28 NOTE — TRANSFER OF CARE
"Anesthesia Transfer of Care Note    Patient: Lora Goins    Procedure(s) Performed: Procedure(s) (LRB):  ARTHROPLASTY, KNEE - [employer] [facility] (Right)    Patient location: PACU    Anesthesia Type: general    Transport from OR: Transported from OR on 6-10 L/min O2 by face mask with adequate spontaneous ventilation    Post pain: adequate analgesia    Post assessment: no apparent anesthetic complications and tolerated procedure well    Post vital signs: stable    Level of consciousness: awake    Nausea/Vomiting: no nausea/vomiting    Complications: none    Transfer of care protocol was followed      Last vitals:   Visit Vitals  /65   Pulse 71   Temp 36.7 °C (98.1 °F) (Oral)   Resp 20   Ht 5' 2" (1.575 m)   Wt 74.8 kg (165 lb)   SpO2 100%   Breastfeeding No   BMI 30.18 kg/m²     "

## 2020-07-28 NOTE — PLAN OF CARE
Problem: Occupational Therapy Goal  Goal: Occupational Therapy Goal  Description: Goals to be met by: 7-31-20    Patient will increase functional independence with ADLs by performing:    UE Dressing with Modified [place].  LE Dressing with Supervision.  Grooming while standing at sink with Modified [place].  Toileting from toilet with Modified [place] for hygiene and clothing management.   Toilet transfer to toilet with Supervision.    Outcome: Ongoing, Progressing    OT evaluation with goals established.     Herlinda Scott, OT  7/28/2020

## 2020-07-28 NOTE — NURSING
Pt arrived to floor via bed. Pt is AAO*4. Vitals stable. Bed locked in lowest position, call button in reach

## 2020-07-28 NOTE — NURSING
Pt refused insuline, glucose 243. Educated pt on need for insuline. Pt said she may allow administration of insuline tomorrow. Will continue to monitor.

## 2020-07-29 ENCOUNTER — PATIENT OUTREACH (OUTPATIENT)
Dept: ORTHOPEDICS | Facility: CLINIC | Age: 65
End: 2020-07-29

## 2020-07-29 VITALS
WEIGHT: 165 LBS | OXYGEN SATURATION: 97 % | BODY MASS INDEX: 30.36 KG/M2 | DIASTOLIC BLOOD PRESSURE: 69 MMHG | HEIGHT: 62 IN | SYSTOLIC BLOOD PRESSURE: 120 MMHG | RESPIRATION RATE: 16 BRPM | HEART RATE: 74 BPM | TEMPERATURE: 98 F

## 2020-07-29 LAB — POCT GLUCOSE: 103 MG/DL (ref 70–110)

## 2020-07-29 PROCEDURE — 97116 GAIT TRAINING THERAPY: CPT

## 2020-07-29 PROCEDURE — 97110 THERAPEUTIC EXERCISES: CPT

## 2020-07-29 PROCEDURE — 63600175 PHARM REV CODE 636 W HCPCS: Performed by: ANESTHESIOLOGY

## 2020-07-29 PROCEDURE — 94761 N-INVAS EAR/PLS OXIMETRY MLT: CPT

## 2020-07-29 PROCEDURE — 97535 SELF CARE MNGMENT TRAINING: CPT

## 2020-07-29 PROCEDURE — 25000003 PHARM REV CODE 250: Performed by: ANESTHESIOLOGY

## 2020-07-29 PROCEDURE — 25000003 PHARM REV CODE 250: Performed by: STUDENT IN AN ORGANIZED HEALTH CARE EDUCATION/TRAINING PROGRAM

## 2020-07-29 PROCEDURE — 99900035 HC TECH TIME PER 15 MIN (STAT)

## 2020-07-29 PROCEDURE — 25000003 PHARM REV CODE 250: Performed by: NURSE PRACTITIONER

## 2020-07-29 PROCEDURE — 63600175 PHARM REV CODE 636 W HCPCS: Performed by: NURSE PRACTITIONER

## 2020-07-29 RX ORDER — METHOCARBAMOL 750 MG/1
750 TABLET, FILM COATED ORAL 3 TIMES DAILY PRN
Status: DISCONTINUED | OUTPATIENT
Start: 2020-07-29 | End: 2020-07-29 | Stop reason: HOSPADM

## 2020-07-29 RX ADMIN — DOCUSATE SODIUM 50MG AND SENNOSIDES 8.6MG 1 TABLET: 8.6; 5 TABLET, FILM COATED ORAL at 09:07

## 2020-07-29 RX ADMIN — ASPIRIN 81 MG: 81 TABLET, COATED ORAL at 09:07

## 2020-07-29 RX ADMIN — OXYCODONE HYDROCHLORIDE 5 MG: 5 TABLET ORAL at 10:07

## 2020-07-29 RX ADMIN — METHOCARBAMOL TABLETS 750 MG: 750 TABLET, COATED ORAL at 10:07

## 2020-07-29 RX ADMIN — ROPIVACAINE HYDROCHLORIDE 6 ML/HR: 2 INJECTION, SOLUTION EPIDURAL; INFILTRATION at 01:07

## 2020-07-29 RX ADMIN — OXYCODONE HYDROCHLORIDE 10 MG: 10 TABLET ORAL at 06:07

## 2020-07-29 RX ADMIN — CELECOXIB 200 MG: 200 CAPSULE ORAL at 09:07

## 2020-07-29 RX ADMIN — ACETAMINOPHEN 1000 MG: 500 TABLET ORAL at 06:07

## 2020-07-29 RX ADMIN — POLYETHYLENE GLYCOL (3350) 17 G: 17 POWDER, FOR SOLUTION ORAL at 09:07

## 2020-07-29 RX ADMIN — MUPIROCIN 1 G: 20 OINTMENT TOPICAL at 09:07

## 2020-07-29 RX ADMIN — MORPHINE SULFATE 2 MG: 2 INJECTION, SOLUTION INTRAMUSCULAR; INTRAVENOUS at 01:07

## 2020-07-29 RX ADMIN — DULOXETINE 30 MG: 30 CAPSULE, DELAYED RELEASE ORAL at 09:07

## 2020-07-29 RX ADMIN — AMLODIPINE BESYLATE 5 MG: 5 TABLET ORAL at 09:07

## 2020-07-29 RX ADMIN — GABAPENTIN 300 MG: 300 CAPSULE ORAL at 09:07

## 2020-07-29 RX ADMIN — ROPIVACAINE HYDROCHLORIDE: 2 INJECTION, SOLUTION EPIDURAL; INFILTRATION at 09:07

## 2020-07-29 RX ADMIN — PANTOPRAZOLE SODIUM 40 MG: 40 TABLET, DELAYED RELEASE ORAL at 09:07

## 2020-07-29 NOTE — PLAN OF CARE
"Patient tolerated PT session well. She ambulated 280ft x2 trials with RW and supervision. No LOB or SOB noted. She ascended/descended 1 6" curb step with RW and CGA. She performed R LE therex 2x10. She has an OPPT appointment on 2020. She is ready to discharge back to the [facility].     Problem: Physical Therapy Goal  Goal: Physical Therapy Goal  Description: Goals to be met by: 2020    Patient will increase functional independence with mobility by performin. Supine to sit with supervision  2. Sit to stand transfer with Supervision  3. Gait x400 feet with Supervision using Rolling Walker  4. Ascend/Descend 6 inch curb step with Stand-by Assistance using Rolling Walker  5. Lower extremity exercise program x30 reps per handout, with supervision    Outcome: Met     "

## 2020-07-29 NOTE — PLAN OF CARE
Future Appointments   Date Time Provider Department Center   7/30/2020  1:45 PM Evin Valenzuela, PT [facility]   7/31/2020  1:30 PM Evin Valenzuela, PT [facility]   8/3/2020  8:15 AM Arthur Jacob, PT [facility]   8/3/2020  9:30 AM Herlinda Garcia, NP [facility] [address]     [employer] patient discharged to [facility] with family. BSC delivered to room at [facility]. Patient to begin therapy on 7/30/20 @ 145PM [facility] location.   07/29/20 1414   Final Note   Assessment Type Final Discharge Note   Anticipated Discharge Disposition Home   What phone number can be called within the next 1-3 days to see how you are doing after discharge?   ([phone])   Hospital Follow Up  Appt(s) scheduled? Yes   Discharge plans and expectations educations in teach back method with documentation complete? Yes

## 2020-07-29 NOTE — PROGRESS NOTES
Acute Pain Service and Perioperative Surgical Home Progress Note    HPI  Lora Goins is a 64 y.o., female,     Interval history      Surgery:  Procedure(s) (LRB):  ARTHROPLASTY, KNEE - [employer] [facility] (Right)    Post Op Day #: 1    Catheter type: Perineural Adductor Canal    Infusion type: Ropivacaine 0.2%  7 mL Q3 hours; 5mL Q30 min PRN    Problem List:    Active Hospital Problems    Diagnosis  POA    *Primary osteoarthritis of right knee s/p TKA 7/28/20 [M17.11]  Yes    Prediabetes [R73.03]  Yes    Obesity (BMI 30-39.9) [E66.9]  Yes    History of tobacco abuse [Z87.891]  Not Applicable    Chronic venous insufficiency [I87.2]  Yes    Essential hypertension [I10]  Yes    GERD (gastroesophageal reflux disease) [K21.9]  Yes    Hyperlipidemia [E78.5]  Yes    Peripheral neuropathy [G62.9]  Yes     DR Goodwin  - Rx Ultram      Fibromyalgia [M79.7]  Yes    RLS (restless legs syndrome) [G25.81]  Yes      Resolved Hospital Problems   No resolved problems to display.       Subjective:       General appearance of alert, oriented, no complaints   Pain with rest: 4    Numbers   Pain with movement: 2    Numbers   Side Effects    1. Pruritis No    2. Nausea No    3. Motor Blockade No    4. Sedation No, 1=awake and alert    Schedule Medications:    acetaminophen  1,000 mg Oral Q6H    amLODIPine  5 mg Oral Daily    aspirin  81 mg Oral BID    celecoxib  200 mg Oral Daily    DULoxetine  30 mg Oral Daily    ezetimibe  10 mg Oral QHS    gabapentin  300 mg Oral BID    [START ON 7/30/2020] hydroCHLOROthiazide  25 mg Oral Daily    mupirocin  1 g Nasal BID    pantoprazole  40 mg Oral Daily    polyethylene glycol  17 g Oral Daily    rOPINIRole  4 mg Oral QHS    senna-docusate 8.6-50 mg  1 tablet Oral BID        Continuous Infusions:   sodium chloride 0.9% 150 mL/hr at 07/28/20 1031    ropivacaine (PF) 2 mg/ml (0.2%) 6 mL/hr (07/29/20 0113)    ropivacaine          PRN Medications:  bisacodyL, dextrose 50%,  dextrose 50%, glucagon (human recombinant), glucose, glucose, hydrALAZINE, insulin aspart U-100, morphine, naloxone, ondansetron, oxyCODONE, oxyCODONE, promethazine (PHENERGAN) IVPB, ropivacaine (PF) 2 mg/ml (0.2%), ropivacaine       Antibiotics:  Antibiotics (From admission, onward)    Start     Stop Route Frequency Ordered    07/28/20 2100  mupirocin 2 % ointment 1 g      08/02 2059 Nasl 2 times daily 07/28/20 1610             Objective:     Catheter site clean, dry, intact          Vital Signs (Most Recent):  Temp: 98.3 °F (36.8 °C) (07/29/20 0018)  Pulse: 70 (07/29/20 0018)  Resp: 16 (07/29/20 0112)  BP: (!) 107/57 (07/29/20 0018)  SpO2: 95 % (07/29/20 0018) Vital Signs Range (Last 24H):  Temp:  [97.5 °F (36.4 °C)-98.3 °F (36.8 °C)]   Pulse:  [67-84]   Resp:  [14-24]   BP: (104-141)/(55-93)   SpO2:  [95 %-100 %]          I & O (Last 24H):    Intake/Output Summary (Last 24 hours) at 7/29/2020 0353  Last data filed at 7/28/2020 0858  Gross per 24 hour   Intake 1800 ml   Output --   Net 1800 ml       Physical Exam:    GA: Alert, comfortable, no acute distress.   Pulmonary: Clear to auscultation A/P/L. No wheezing, crackles, or rhonchi.  Cardiac: RRR S1 & S2 w/o rubs/murmurs/gallops.   Abdominal:Bowel sounds present. No tenderness to palpation or distension. No appreciable hepatosplenomegaly.   Skin: No jaundice, rashes, or visible lesions.         Laboratory:  CBC:   Recent Labs     07/27/20  1304   WBC 6.03   RBC 5.12   HGB 13.1   HCT 43.5      MCV 85   MCH 25.6*   MCHC 30.1*       BMP:   Recent Labs     07/27/20  1304      K 3.9   CO2 30*      BUN 19   CREATININE 1.0   *   CALCIUM 9.4                Anticoagulant dose ASA at 81mg BID.    Assessment:         Pain control adequate    Plan:     1) Pain:    Adductor canal perineural catheter in place and infusing. Good level. Multimodal pain regimen with acetaminophen, Celebrex, Lyrica, and prn oxycodone given  Will continue to monitor. Plan  to discharge with Tawanna on POD #1.   2) HTN: Continue home regimen of amlodipine and HCTZ   3) FEN/GI: Tolerating liquids, advance diet as tolerated. Denies flatus. Denies BM.   4) Dispo: Pt working well with PT/OT. Case management and SW for placement. Plan for discharge POD #1.        Evaluator Pepper Saeed PA-C

## 2020-07-29 NOTE — ANESTHESIA POSTPROCEDURE EVALUATION
Anesthesia Post Evaluation    Patient: Lora Goins    Procedure(s) Performed: Procedure(s) (LRB):  ARTHROPLASTY, KNEE - [employer] [facility] (Right)    Final Anesthesia Type: spinal    Patient location during evaluation: floor  Patient participation: Yes- Able to Participate  Level of consciousness: awake and alert  Post-procedure vital signs: reviewed and stable  Pain management: adequate  Airway patency: patent    PONV status at discharge: No PONV  Anesthetic complications: no      Cardiovascular status: blood pressure returned to baseline  Respiratory status: unassisted  Hydration status: euvolemic  Follow-up not needed.          Vitals Value Taken Time   /69 07/29/20 0700   Temp 36.4 °C (97.5 °F) 07/29/20 0700   Pulse 74 07/29/20 0811   Resp 16 07/29/20 1003   SpO2 97 % 07/29/20 0811         Event Time   Out of Recovery 10:59:00         Pain/Micaela Score: Pain Rating Prior to Med Admin: 7 (7/29/2020 10:03 AM)  Micaela Score: 9 (7/28/2020 10:15 AM)

## 2020-07-29 NOTE — SUBJECTIVE & OBJECTIVE
Principal Problem:Primary osteoarthritis of right knee    Principal Orthopedic Problem: same    Interval History: Patient seen and examined at bedside.  No acute events overnight.  Pain controlled.  Walked 100 feet with PT yesterday.  Refused insulin.      Review of patient's allergies indicates:   Allergen Reactions    Ezetimibe-simvastatin      Cramps  Can take Ezetimibe    Lovastatin      cramps    Pitavastatin      cramps    Pravastatin      Muscle cramps       Current Facility-Administered Medications   Medication    0.9%  NaCl infusion    acetaminophen tablet 1,000 mg    amLODIPine tablet 5 mg    aspirin EC tablet 81 mg    bisacodyL suppository 10 mg    celecoxib capsule 200 mg    dextrose 50% injection 12.5 g    dextrose 50% injection 25 g    DULoxetine DR capsule 30 mg    ezetimibe tablet 10 mg    gabapentin capsule 300 mg    glucagon (human recombinant) injection 1 mg    glucose chewable tablet 16 g    glucose chewable tablet 24 g    hydrALAZINE tablet 10 mg    [START ON 7/30/2020] hydroCHLOROthiazide tablet 25 mg    insulin aspart U-100 pen 1-10 Units    morphine injection 2 mg    mupirocin 2 % ointment 1 g    naloxone 0.4 mg/mL injection 0.02 mg    ondansetron injection 4 mg    oxyCODONE immediate release tablet 10 mg    oxyCODONE immediate release tablet 5 mg    pantoprazole EC tablet 40 mg    polyethylene glycol packet 17 g    promethazine (PHENERGAN) 6.25 mg in dextrose 5 % 50 mL IVPB    rOPINIRole tablet 4 mg    ropivacaine (PF) 2 mg/ml (0.2%) infusion 8 mL    ropivacaine (PF) 2 mg/ml (0.2%) infusion    ropivacaine 0.2% ON-Q C-BLOC 400 ML (SELECT A FLOW)    senna-docusate 8.6-50 mg per tablet 1 tablet     Objective:     Vital Signs (Most Recent):  Temp: 97.9 °F (36.6 °C) (07/29/20 0403)  Pulse: 68 (07/29/20 0403)  Resp: 16 (07/29/20 0625)  BP: 118/65 (07/29/20 0403)  SpO2: 96 % (07/29/20 0403) Vital Signs (24h Range):  Temp:  [97.5 °F (36.4 °C)-98.3 °F (36.8 °C)]  "97.9 °F (36.6 °C)  Pulse:  [67-84] 68  Resp:  [14-24] 16  SpO2:  [95 %-100 %] 96 %  BP: (104-132)/(55-93) 118/65     Weight: 74.8 kg (165 lb)  Height: 5' 2" (157.5 cm)  Body mass index is 30.18 kg/m².      Intake/Output Summary (Last 24 hours) at 7/29/2020 0628  Last data filed at 7/28/2020 0858  Gross per 24 hour   Intake 1800 ml   Output --   Net 1800 ml       Ortho/SPM Exam  NAD  No increased WOB  Dressing c/d/i  SILT T/SP/DP  Motor intact T/DP  SLR intact  WWP extremities    Significant Labs:   POCT Glucose:   Recent Labs   Lab 07/28/20  1627 07/28/20  2127 07/29/20  0617   POCTGLUCOSE 243* 120* 103     All pertinent labs within the past 24 hours have been reviewed.    Significant Imaging: I have reviewed all pertinent imaging results/findings.  "

## 2020-07-29 NOTE — DISCHARGE SUMMARY
[facility]  Orthopedics  Discharge Summary      Patient Name: Lora Goins  MRN: [id number]  Admission Date: 7/28/2020  Hospital Length of Stay: 1 days  Discharge Date and Time: 7/29/2020 11:58 AM  Attending Physician: Cahpito Elkins MD  Discharging Provider: Roosevelt Dominguez MD  Primary Care Provider: Shira Stewart NP    HPI: presented for R TKA.    Procedure(s) (LRB):  ARTHROPLASTY, KNEE - [employer] [facility] (Right)      Hospital Course: Uncomplicated.  DC home POD1 after cleared by PT.  ASA 81 BID for DVT PPx.  WBAT.  F/u 2 weeks for wound check.      Consults (From admission, onward)        Status Ordering Provider     Inpatient consult to Pain Management  Once     Provider:  (Not yet assigned)    Acknowledged HERLINDA GARCIA     Inpatient consult to Respiratory Care  Once     Provider:  (Not yet assigned)    Acknowledged HERLINDA GARCIA     Inpatient consult to Social Work  Once     Provider:  (Not yet assigned)    Acknowledged HERLINDA GARCIA          Significant Diagnostic Studies: No pertinent studies.    Pending Diagnostic Studies:     None        Final Active Diagnoses:    Diagnosis Date Noted POA    PRINCIPAL PROBLEM:  Primary osteoarthritis of right knee s/p TKA 7/28/20 [M17.11] 07/28/2020 Yes    Prediabetes [R73.03] 07/27/2020 Yes    Obesity (BMI 30-39.9) [E66.9] 07/16/2020 Yes    History of tobacco abuse [Z87.891] 08/28/2019 Not Applicable    Chronic venous insufficiency [I87.2] 08/23/2019 Yes    Essential hypertension [I10] 08/22/2019 Yes    GERD (gastroesophageal reflux disease) [K21.9] 08/22/2019 Yes    Hyperlipidemia [E78.5] 08/22/2019 Yes    Peripheral neuropathy [G62.9] 08/22/2019 Yes    Fibromyalgia [M79.7] 08/22/2019 Yes    RLS (restless legs syndrome) [G25.81] 08/05/2016 Yes      Problems Resolved During this Admission:      Discharged Condition: stable    Disposition: Home or Self Care    Follow Up:  Follow-up Information     Herlinda Garcia NP On  "8/3/2020.    Specialty: Orthopedic Surgery  Contact information:  [address]  [facility] [address]  [phone]                 Patient Instructions:      BATH/SHOWER CHAIR FOR HOME USE     Order Specific Question Answer Comments   Height: 5' 2" (1.575 m)    Weight: 74.8 kg (165 lb)    Length of need (1-99 months): 99    Type: Without back      COMMODE FOR HOME USE     Order Specific Question Answer Comments   Type: Standard    Height: 5' 2" (1.575 m)    Weight: 74.8 kg (165 lb)    Length of need (1-99 months): 1      WALKER FOR HOME USE     Order Specific Question Answer Comments   Type of Walker: Adult (5'4"-6'6")    With wheels? Yes    Height: 5' 2" (1.575 m)    Weight: 74.8 kg (165 lb)    Length of need (1-99 months): 1    Please check all that apply: Walker will be used for gait training.      TRANSFER TUB BENCH FOR HOME USE     Order Specific Question Answer Comments   Type of Transfer Tub Bench: Unpadded    Height: 5' 2" (1.575 m)    Weight: 74.8 kg (165 lb)    Length of need (1-99 months): 1      Medications:  Reconciled Home Medications:      Medication List      CONTINUE taking these medications    amLODIPine 5 MG tablet  Commonly known as: NORVASC  Take 5 mg by mouth once daily. Take in am of surgery     aspirin 81 MG EC tablet  Commonly known as: ECOTRIN  Take 1 tablet (81 mg total) by mouth 2 (two) times daily.     celecoxib 200 MG capsule  Commonly known as: CeleBREX  Take 200 mg by mouth 2 (two) times daily. Hold for 1 week prior to surgery     DULoxetine 30 MG capsule  Commonly known as: CYMBALTA  Take 30 mg by mouth once daily. Take in am of surgery     ezetimibe 10 mg tablet  Commonly known as: ZETIA  Take 10 mg by mouth every evening. Takes at night     gabapentin 300 MG capsule  Commonly known as: NEURONTIN  Take 300 mg by mouth 2 (two) times daily. Take if needed     hydroCHLOROthiazide 25 MG tablet  Commonly known as: HYDRODIURIL  Take 25 mg by mouth once daily. Hold am of surgery   "   MAPAP ARTHRITIS PAIN 650 MG Tbsr  Generic drug: acetaminophen  Take 1 tablet (650 mg total) by mouth every 8 (eight) hours as needed.     NexIUM 40 MG capsule  Generic drug: esomeprazole  Take 40 mg by mouth once daily. Take in am of surgery     oxyCODONE 5 MG immediate release tablet  Commonly known as: ROXICODONE  Take 1-2 tabs by mouth every 4-6 hours as needed for pain     rOPINIRole 4 MG tablet  Commonly known as: REQUIP  Takes at night     STOOL SOFTENER 100 MG capsule  Generic drug: docusate sodium  Take 1 capsule (100 mg total) by mouth 2 (two) times daily as needed for Constipation.     traMADoL 50 mg tablet  Commonly known as: ULTRAM  2 (two) times daily as needed. Pain    Take if needed            Roosevelt Dominguez MD  Orthopedics  [facility]

## 2020-07-29 NOTE — PROGRESS NOTES
[facility]  Orthopedics  Progress Note    Patient Name: Lora Goins  MRN: [id number]  Admission Date: 7/28/2020  Hospital Length of Stay: 1 days  Attending Provider: Chapito Elkins MD  Primary Care Provider: Shira Stewart NP  Follow-up For: Procedure(s) (LRB):  ARTHROPLASTY, KNEE - [facility] NELSY (Right)    Post-Operative Day: 1 Day Post-Op  Subjective:     Principal Problem:Primary osteoarthritis of right knee    Principal Orthopedic Problem: same    Interval History: Patient seen and examined at bedside.  No acute events overnight.  Pain controlled.  Walked 100 feet with PT yesterday.  Refused insulin.      Review of patient's allergies indicates:   Allergen Reactions    Ezetimibe-simvastatin      Cramps  Can take Ezetimibe    Lovastatin      cramps    Pitavastatin      cramps    Pravastatin      Muscle cramps       Current Facility-Administered Medications   Medication    0.9%  NaCl infusion    acetaminophen tablet 1,000 mg    amLODIPine tablet 5 mg    aspirin EC tablet 81 mg    bisacodyL suppository 10 mg    celecoxib capsule 200 mg    dextrose 50% injection 12.5 g    dextrose 50% injection 25 g    DULoxetine DR capsule 30 mg    ezetimibe tablet 10 mg    gabapentin capsule 300 mg    glucagon (human recombinant) injection 1 mg    glucose chewable tablet 16 g    glucose chewable tablet 24 g    hydrALAZINE tablet 10 mg    [START ON 7/30/2020] hydroCHLOROthiazide tablet 25 mg    insulin aspart U-100 pen 1-10 Units    morphine injection 2 mg    mupirocin 2 % ointment 1 g    naloxone 0.4 mg/mL injection 0.02 mg    ondansetron injection 4 mg    oxyCODONE immediate release tablet 10 mg    oxyCODONE immediate release tablet 5 mg    pantoprazole EC tablet 40 mg    polyethylene glycol packet 17 g    promethazine (PHENERGAN) 6.25 mg in dextrose 5 % 50 mL IVPB    rOPINIRole tablet 4 mg    ropivacaine (PF) 2 mg/ml (0.2%) infusion 8 mL    ropivacaine (PF) 2 mg/ml  "(0.2%) infusion    ropivacaine 0.2% ON-Q C-BLOC 400 ML (SELECT A FLOW)    senna-docusate 8.6-50 mg per tablet 1 tablet     Objective:     Vital Signs (Most Recent):  Temp: 97.9 °F (36.6 °C) (07/29/20 0403)  Pulse: 68 (07/29/20 0403)  Resp: 16 (07/29/20 0625)  BP: 118/65 (07/29/20 0403)  SpO2: 96 % (07/29/20 0403) Vital Signs (24h Range):  Temp:  [97.5 °F (36.4 °C)-98.3 °F (36.8 °C)] 97.9 °F (36.6 °C)  Pulse:  [67-84] 68  Resp:  [14-24] 16  SpO2:  [95 %-100 %] 96 %  BP: (104-132)/(55-93) 118/65     Weight: 74.8 kg (165 lb)  Height: 5' 2" (157.5 cm)  Body mass index is 30.18 kg/m².      Intake/Output Summary (Last 24 hours) at 7/29/2020 0628  Last data filed at 7/28/2020 0858  Gross per 24 hour   Intake 1800 ml   Output --   Net 1800 ml       Ortho/SPM Exam  NAD  No increased WOB  Dressing c/d/i  SILT T/SP/DP  Motor intact T/DP  SLR intact  WWP extremities    Significant Labs:   POCT Glucose:   Recent Labs   Lab 07/28/20  1627 07/28/20  2127 07/29/20  0617   POCTGLUCOSE 243* 120* 103     All pertinent labs within the past 24 hours have been reviewed.    Significant Imaging: I have reviewed all pertinent imaging results/findings.    Assessment/Plan:     * Primary osteoarthritis of right knee s/p TKA 7/28/20  64 y.o. female s/p R TKA 7/28/20    VS:  stable  Nerve block:  Adductor PNC, periop spinal  PT/OT:  WBAT  DVT PPx:  ASA 81 BID  Cultures:  non  Abx:  Postop Ancef  Labs:  none  Drain:  none  Clark:  none    Dispo: likely home today; OP PT set up 7/30/20      Prediabetes  Stable     Obesity (BMI 30-39.9)  Stable     History of tobacco abuse  Cessation counseling    Chronic venous insufficiency  Stable     Fibromyalgia  Home meds    RLS (restless legs syndrome)  Home meds    Peripheral neuropathy  Home meds    Hyperlipidemia  Home meds    GERD (gastroesophageal reflux disease)  Home meds    Essential hypertension  Home meds          Roosevelt Dominguez MD  Orthopedics  [facility]  "

## 2020-07-29 NOTE — PLAN OF CARE
Patient is AAOX4. Frequent checks maintained q 2 hours. Vital signs have remained stable throughout shift. Ambulated to bathroom X2 with no issue. Pain controlled with prn medication. Will continue to monitor.

## 2020-07-29 NOTE — PT/OT/SLP PROGRESS
Occupational Therapy   Treatment/Discharge    Name: Lora Goins  MRN: [id number]  Admitting Diagnosis:  Primary osteoarthritis of right knee  1 Day Post-Op    Recommendations:     Discharge Recommendations: home  Discharge Equipment Recommendations:  bedside commode  Barriers to discharge:       Assessment:     Lora Goins is a 64 y.o. female with a medical diagnosis of Primary osteoarthritis of right knee.  Patient is s/p right TKA. Patient did well with session today. She completed ADLs and ADL transfers at MOD I. She is function near baseline level of function s/p right TKA for ADLs and is staying at [facility] x 1 week with assistance of .     Rehab Prognosis:  Good; patient would benefit from acute skilled OT services to address these deficits and reach maximum level of function.       Plan:     · DC from OT    Subjective     Patient stated she felt better after freshening up.     Pain/Comfort:  · Pain Rating 1: 3/10  · Location - Side 1: Right  · Location - Orientation 1: generalized  · Location 1: knee  · Pain Addressed 1: Reposition, Distraction    Objective:     Communicated with: RN prior to session.  Patient found up in chair with cryotherapy, FCD, perineural catheter upon OT entry to room.    General Precautions: Standard, fall   Orthopedic Precautions:RLE weight bearing as tolerated   Braces: N/A     Occupational Performance:     Functional Mobility/Transfers:  · Patient completed Sit <> Stand Transfer with modified independence  with  rolling walker   · Patient completed Toilet Transfer Step Transfer technique with modified independence with  rolling walker   · Patient completed chair transfer with step transfer technique with modified independence with rolling walker   · Functional Mobility: patient ambulated to/from bathroom with use of rolling walker at supervision     Activities of Daily Living:  · Grooming: modified independence standing at sink to brush teeth, wash face and wash  hands  · Upper Body Dressing: modified independence sitting on bedside commode placed over toilet to don bra and overhead shirt  · Lower Body Dressing: modified independence sitting on bedside commode placed over toilet for underwear/shorts, donned shoes sitting in chair  · Toileting: modified independence completed on bedside commode placed over toilet    [facility] 6 Click ADL: 24    Treatment & Education:  -Patient educated to dress surgical side first and further dressing techniques s/p surgery   -Patient educated on hand placement for transfers   -Patient educated on rolling walker placement for ADLs  -Patient educated on polar ice use  -Patient educated on car transfers   -Patient educated on role of OT and plan of care     Patient left up in chair with all lines intact, call button in reach and RN notifiedEducation:      GOALS:   Multidisciplinary Problems     Occupational Therapy Goals     Not on file          Multidisciplinary Problems (Resolved)        Problem: Occupational Therapy Goal    Goal Priority Disciplines Outcome Interventions   Occupational Therapy Goal   (Resolved)     OT, PT/OT Met    Description: Goals to be met by: 7-31-20    Patient will increase functional independence with ADLs by performing:    UE Dressing with Modified [place].  LE Dressing with Supervision.  Grooming while standing at sink with Modified [place].  Toileting from toilet with Modified [place] for hygiene and clothing management.   Toilet transfer to toilet with Supervision.                     Time Tracking:     OT Date of Treatment: 07/29/20  OT Start Time: 0911  OT Stop Time: 0937  OT Total Time (min): 26 min    Billable Minutes:Self Care/Home Management 26    Herlinda Scott OT  7/29/2020

## 2020-07-29 NOTE — PLAN OF CARE
Problem: Occupational Therapy Goal  Goal: Occupational Therapy Goal  Description: Goals to be met by: 7-31-20    Patient will increase functional independence with ADLs by performing:    UE Dressing with Modified [place].  LE Dressing with Supervision.  Grooming while standing at sink with Modified [place].  Toileting from toilet with Modified [place] for hygiene and clothing management.   Toilet transfer to toilet with Supervision.    Outcome: Met       OT goal met for discharge. Staying at [facility] x 1 week with assistance of      Herlinda Scott, OT  7/29/2020     [facility] phone call message-patient reporting a symptom:     Symptom: Red eyes     When did symptoms begin? Yesterday    Characteristics: (location on body, intensity, what makes it better or worse, associated symptoms )      Additional Details Patient does  for two grand daughters who both had pink eye last week, so she is sure that is what is is. Would like a prescription called in to [facility] on [address] with out an office visit.      Same Day Visit Offered: Yes, declined    Additional comments:     OK to leave message on voice mail? Yes    Advised patient that RN would call back within 3 hours, unless emergent   Primary language: English      needed? No    Call taken on July 9, 2019 at 8:56 AM by Kamla Cordova

## 2020-07-29 NOTE — PT/OT/SLP PROGRESS
"Physical Therapy Treatment and Discharge     Patient Name:  Lora Goins   MRN:  [id number]    Recommendations:     Discharge Recommendations:  outpatient PT(7/30/2020)   Discharge Equipment Recommendations: bedside commode   Barriers to discharge: patient going to stay at the [facility] for a week prior to going home    Assessment:     Lora Goins is a 64 y.o. female admitted with a medical diagnosis of Primary osteoarthritis of right knee.  She presents with the following impairments/functional limitations:  weakness, impaired functional mobilty, gait instability, impaired balance, decreased lower extremity function, pain, decreased ROM, impaired skin, orthopedic precautions. Patient tolerated PT session well. She ambulated 280ft x2 trials with RW and supervision. No LOB or SOB noted. She ascended/descended 1 6" curb step with RW and CGA. She performed R LE therex 2x10. She has an OPPT appointment on 7/30/2020. She is ready to discharge back to the [facility].     Rehab Prognosis: Good; patient would benefit from acute skilled PT services to address these deficits and reach maximum level of function.    Recent Surgery: Procedure(s) (LRB):  ARTHROPLASTY, KNEE - [facility] [facility] (Right) 1 Day Post-Op    Plan:     During this hospitalization, patient to be seen daily to address the identified rehab impairments via gait training, therapeutic activities, therapeutic exercises and progress toward the following goals:    · Plan of Care Expires:  07/31/20    Subjective     Chief Complaint: Pain in back of right knee and leg feels heavy today.   Patient/Family Comments/goals: To walk without pain.   Pain/Comfort:  · Pain Rating 1: 5/10  · Location - Side 1: Right  · Location - Orientation 1: posterior  · Location 1: knee  · Pain Addressed 1: Pre-medicate for activity, Reposition, Distraction, Cessation of Activity, Nurse notified      Objective:     Communicated with RN prior to session. Patient found up in chair with " "cryotherapy, perineural catheter upon PT entry to room.  present throughout PT session.     General Precautions: Standard, fall   Orthopedic Precautions:RLE weight bearing as tolerated   Braces: N/A     Functional Mobility:  · Mat Mobility:     · Supine to Sit: supervision  · Sit to Supine: supervision  · Transfers:     · Sit to Stand:  supervision with rolling walker x1 from bedside chair and x1 from mat with verbal cues for hand placement   · Gait: Patient ambulated 280ft x2 trials with Rolling Walker and supervision using 3-point gait. Patient demonstrated decreased jose j and decreased step length during gait due to pain, decreased ROM and decreased strength. Verbal cues provided for gait sequence, knee extension in stance phase, and RW management.   · Stairs:  Pt ascended/descended 6" curb step with Rolling Walker and Contact Guard Assistance. Verbal cues provided for technique.     AM-PAC 6 CLICK MOBILITY  Turning over in bed (including adjusting bedclothes, sheets and blankets)?: 4  Sitting down on and standing up from a chair with arms (e.g., wheelchair, bedside commode, etc.): 4  Moving from lying on back to sitting on the side of the bed?: 4  Moving to and from a bed to a chair (including a wheelchair)?: 4  Need to walk in hospital room?: 4  Climbing 3-5 steps with a railing?: 3  Basic Mobility Total Score: 23     Therapeutic Activities and Exercises:  Patient educated in and performed R LE exercises 2x10 for ankle pumps, quad set, glute set, SAQ over bolster, heel slides, hip abd/add, SLR, and LAQ.    Patient and  educated in:  -PT role and POC  -safety with transfers including hand placement  -gait sequencing and RW management  -OOB activity to maximize recovery including ambulating at home to prevent DVT   -car and truck transfer  -curb training  -HEP for therex at home with handout provided   -polar ice use and management    Patient left up in chair with all lines intact, call button in " jessica, RN notified and  present.    GOALS:   Multidisciplinary Problems     Physical Therapy Goals     Not on file          Multidisciplinary Problems (Resolved)        Problem: Physical Therapy Goal    Goal Priority Disciplines Outcome Goal Variances Interventions   Physical Therapy Goal   (Resolved)     PT, PT/OT Met     Description: Goals to be met by: 2020    Patient will increase functional independence with mobility by performin. Supine to sit with supervision  2. Sit to stand transfer with Supervision  3. Gait x400 feet with Supervision using Rolling Walker  4. Ascend/Descend 6 inch curb step with Stand-by Assistance using Rolling Walker  5. Lower extremity exercise program x30 reps per handout, with supervision                     Time Tracking:     PT Received On: 20  PT Start Time: 1018     PT Stop Time: 1056  PT Total Time (min): 38 min     Billable Minutes: Gait Training 23 and Therapeutic Exercise 15    Treatment Type: Treatment  PT/PTA: PT       Kate Bailey, PT  2020

## 2020-07-29 NOTE — PLAN OF CARE
[facility] patient   07/29/20 1000   Discharge Assessment   Assessment Type Discharge Planning Assessment   Confirmed/corrected address and phone number on facesheet? Yes   Assessment information obtained from? Medical Record   Expected Length of Stay (days) 1   Communicated expected length of stay with patient/caregiver yes   Prior to hospitilization cognitive status: Alert/Oriented   Prior to hospitalization functional status: Independent   Current cognitive status: Alert/Oriented   Current Functional Status: Assistive Equipment   Facility Arrived From: Home   Lives With spouse   Able to Return to Prior Arrangements yes   Is patient able to care for self after discharge? Yes   Who are your caregiver(s) and their phone number(s)? Brown Goins (spouse) [phone]   Readmission Within the Last 30 Days no previous admission in last 30 days   Patient currently being followed by outpatient case management? No   Patient currently receives any other outside agency services? No   Equipment Currently Used at Home bedside commode   Do you have any problems affording any of your prescribed medications? No   Is the patient taking medications as prescribed? yes   Does the patient have transportation home? Yes   Transportation Anticipated family or friend will provide   Does the patient receive services at the [facility]? No   Discharge Plan A Home with family   Discharge Plan B Home with family   DME Needed Upon Discharge  bedside commode   Patient/Family in Agreement with Plan yes

## 2020-07-29 NOTE — NURSING
IV removed. Discharge instructions, follow up given to and explained to patient. On Q infusing at 6ml/hr. Patient and  educated on On Q. All questions acknowledged. Patient discharged to [facility]. Safety ensured.

## 2020-07-29 NOTE — TELEPHONE ENCOUNTER
[id number] - [id number] Visited patient in [facility], met her in the lobby,  present, escorted her to the room, s/p R TKA 7-28-20, reports pain 2/10, pedal pulse 2+, dressing c/d/i, no s/s of infection, Polar Ice machine not intact, On Q Pump patent, assessed medications, advised on how to administer.  Understanding verbalized.

## 2020-07-30 ENCOUNTER — TELEPHONE (OUTPATIENT)
Dept: ORTHOPEDICS | Facility: CLINIC | Age: 65
End: 2020-07-30

## 2020-07-30 ENCOUNTER — PATIENT OUTREACH (OUTPATIENT)
Dept: ORTHOPEDICS | Facility: CLINIC | Age: 65
End: 2020-07-30

## 2020-07-30 ENCOUNTER — CLINICAL SUPPORT (OUTPATIENT)
Dept: REHABILITATION | Facility: HOSPITAL | Age: 65
End: 2020-07-30
Payer: COMMERCIAL

## 2020-07-30 DIAGNOSIS — M17.11 PRIMARY OSTEOARTHRITIS OF RIGHT KNEE: ICD-10-CM

## 2020-07-30 DIAGNOSIS — M25.661 DECREASED ROM OF RIGHT KNEE: ICD-10-CM

## 2020-07-30 PROCEDURE — 97161 PT EVAL LOW COMPLEX 20 MIN: CPT | Mod: PO

## 2020-07-30 PROCEDURE — 97110 THERAPEUTIC EXERCISES: CPT | Mod: PO

## 2020-07-30 NOTE — TELEPHONE ENCOUNTER
1100 - 1105 Visited patient in [facility], as she was waiting for her room to get cleaned,  present, s/p R TKA 7-28-20, reports pain 0/10, 0 BM, unable to palpate pedal pulse, foot warm, capillary refill < 2 seconds, dressing c/d/i, no s/s of infection, swelling noted, Polar Ice machine not intact, On Q Pump patent, reports taking medications as prescribed, understanding verbalized.     Therapy today at 1345.

## 2020-07-30 NOTE — TELEPHONE ENCOUNTER
Spoke with patient, she agreed to a visit.  She is currently sitting in the [facility] lobby.

## 2020-07-30 NOTE — PROGRESS NOTES
7/30/2020 1126    Patient called at home. Reports pain well controlled with On-Q. Rating pain to right knee 3/10. Patient denies signs of local anesthetic toxicity. PNC dressing remains clean, dry, and intact. All questions answered. Encouraged to call if any issues arise.

## 2020-07-30 NOTE — PLAN OF CARE
[facility]  Physical Therapy Initial Evaluation    Name: Lora Goins  Clinic Number: [id number]    Therapy Diagnosis:   Diagnosis:   Encounter Diagnoses   Name Primary?    Primary osteoarthritis of right knee     Decreased ROM of right knee      Physician: Doctor, Outside    Physician Orders: PT Eval and Treat (Direct to Outpatient TKA)  Medical Diagnosis from Referral: M17.11 (ICD-10-CM) - Primary osteoarthritis of right knee  Evaluation Date: 2020  Authorization Period Expiration: 2021  Plan of Care Expiration: 8/3/2020  Visit # / Visits authorized:     Time In: 150pm  Time Out: 2300,  Total Billable Time: 40 minutes (Eval + TE)    Precautions: Standard    Subjective   Date of onset: 2020  History of current condition - Lora reports: having R TKA 2 days ago. She been dealing with the arthritis for many years. She originally had the surgery scheduled in April but had to move it due to COVID-19. She has received multiple injections, which worked for a few months at a time but eventually wore off. She report all of the pain at this time is in the posterior aspect of her knee.        Past Medical History:   Diagnosis Date    Arthritis     Charcot Kayla Tooth muscular atrophy     Fibromyalgia     GERD (gastroesophageal reflux disease)     Hyperlipidemia     Hypertension     Hypertension     Restless leg syndrome      Lora Goins  has a past surgical history that includes  section; Tonsillectomy; Removal of plantar wart using laser;  section; Foot surgery; Total knee arthroplasty (Left, 2019); Joint replacement; Lithotripsy; Sinus surgery; and Knee Arthroplasty (Right, 2020).    Lora has a current medication list which includes the following prescription(s): acetaminophen, amlodipine, aspirin, celecoxib, docusate sodium, duloxetine, esomeprazole, ezetimibe, gabapentin, hydrochlorothiazide, oxycodone, ropinirole, and  "tramadol.    Review of patient's allergies indicates:   Allergen Reactions    Ezetimibe-simvastatin      Cramps  Can take Ezetimibe    Lovastatin      cramps    Pitavastatin      cramps    Pravastatin      Muscle cramps        Imaging, X-ray Right Knee:   FINDINGS:  Two views: There is a TKA in place good alignment no complication.    Prior Therapy: Yes, for L TKA  Social History: no steps or stairs, lives with their spouse  Occupation: [employer]   Prior Level of Function: Ind  Current Level of Function: Needs assistance    Pain:  Current 0/10, worst 7/10, best 0/10   Location: right back of the knee   Description: Aching  Aggravating Factors: Walking  Easing Factors: pain medication and ice    Pts goals: To get better and control the pain.    Objective     Range of Motion:   Knee Left active Left Passive Right Active R passive   Flexion 120 NT 80 90   Extension 0 NT +7 +4           Lower Extremity Strength  Right LE  Left LE    Knee extension: 3/5 Knee extension: 5/5   Knee flexion: 3/5 Knee flexion: 5/5   Hip flexion: 3/5 Hip flexion: 4+/5         Hip abduction: 3+/5 Hip abduction: 5/5   Hip adduction: 3+/5 Hip adduction 5/5   Ankle dorsiflexion: 3/5 Ankle dorsiflexion: 3/5   Ankle plantarflexion: 3+/5 Ankle plantarflexion: 5/5     STS 30x: 6 reps    TU"    - Sit <--> Stand:mod I   - Bed Mobility: mod I    Joint Mobility: R Patellar- limited in all directions    Palpation: TTP at the joint line and in the popliteal fossa    Sensation: decreased on medial aspect of R LE, distal to the knee    Edema: noted around the entire joint        CMS Impairment/Limitation/Restriction for FOTO Knee Survey    Therapist reviewed FOTO scores for Lora Goins on 2020.   FOTO documents entered into [employer] - see Media section.    Based on clinical assessment  Limitation Score: 40%         TREATMENT   Treatment Time In: 220pm  Treatment Time Out: 230pm  Total Treatment time separate from Evaluation: 10 " minutes    Lora received therapeutic exercises to develop strength and ROM for 10 minutes including: Quad sets, ankle pumps, and glut sets, 3 x 10 each.      Home Exercises and Patient Education Provided    Education provided re: HEP, safety, pain and edema control    Written Home Exercises Provided: Yes.  Exercises were reviewed and Lora was able to demonstrate them prior to the end of the session.   Pt received a written copy of exercises to perform at home. Lora demonstrated good  understanding of the education provided.     Assessment   Lora is a 64 y.o. female referred to outpatient Physical Therapy with a medical diagnosis of primary osteoarthritis of right knee. Pt had a R TKA performed 7/28/2020. Pt will be returning home on Monday and will be seen for a total of 3 visits. She presents with pain, weakness, decreased ROM, edema and gait disturbances. She will benefit from skilled PT services to address the limitations mentioned above.     Pt prognosis is Good.   Pt will benefit from skilled outpatient Physical Therapy to address the deficits stated above and in the chart below, provide pt/family education, and to maximize pt's level of independence.     Plan of care discussed with patient: Yes  Pt's spiritual, cultural and educational needs considered and patient is agreeable to the plan of care and goals as stated below:     Anticipated Barriers for therapy: None    Medical Necessity is demonstrated by the following  History  Co-morbidities and personal factors that may impact the plan of care Co-morbidities:   None    Personal Factors:   no deficits     low   Examination  Body Structures and Functions, activity limitations and participation restrictions that may impact the plan of care Body Regions:   lower extremities    Body Systems:    ROM  strength  gait  motor control    Participation Restrictions:   None    Activity limitations:   Learning and applying knowledge  no deficits    General  Tasks and Commands  no deficits    Communication  no deficits    Mobility  lifting and carrying objects  walking    Self care  washing oneself (bathing, drying, washing hands)    Domestic Life  no deficits    Interactions/Relationships  no deficits    Life Areas  no deficits    Community and Social Life  no deficits         low   Clinical Presentation stable and uncomplicated low   Decision Making/ Complexity Score: low     Goals:  Short Term Goals (3 Visits):   1. Pt will be compliant with HEP to supplement PT in restoring pain free function.  2. Pt will improve TUG test to 22 sec to improve walking speed for functional community ambulation  3. Pt will improve 30s STS test to 8 reps to improve functional LE strength.  4. Pt improve impaired knee AROM 0-90 deg to improve mobility for normal movement patterns.       Plan   Plan of care Certification: 7/30/2020 to 8/3/2020.    Outpatient Physical Therapy 3 times weekly for 1 weeks to include the following interventions: Gait Training, Manual Therapy, Moist Heat/ Ice, Neuromuscular Re-ed, Patient Education, Therapeutic Activites and Therapeutic Exercise.     LOGAN LUO, PT

## 2020-07-31 ENCOUNTER — PATIENT OUTREACH (OUTPATIENT)
Dept: ORTHOPEDICS | Facility: CLINIC | Age: 65
End: 2020-07-31

## 2020-07-31 ENCOUNTER — TELEPHONE (OUTPATIENT)
Dept: ORTHOPEDICS | Facility: CLINIC | Age: 65
End: 2020-07-31

## 2020-07-31 ENCOUNTER — CLINICAL SUPPORT (OUTPATIENT)
Dept: REHABILITATION | Facility: HOSPITAL | Age: 65
End: 2020-07-31
Payer: COMMERCIAL

## 2020-07-31 DIAGNOSIS — M25.661 DECREASED ROM OF RIGHT KNEE: ICD-10-CM

## 2020-07-31 PROCEDURE — 97140 MANUAL THERAPY 1/> REGIONS: CPT | Mod: PO

## 2020-07-31 PROCEDURE — 97110 THERAPEUTIC EXERCISES: CPT | Mod: PO

## 2020-07-31 NOTE — PROGRESS NOTES
"  Physical Therapy Daily Treatment Note     Name: Lora Goins  Clinic Number: [id number]    Therapy Diagnosis:   Encounter Diagnosis   Name Primary?    Decreased ROM of right knee      Physician: Doctor, Outside    Visit Date: 7/31/2020    Physician Orders: PT Eval and Treat (Direct to Outpatient TKA)  Medical Diagnosis from Referral: M17.11 (ICD-10-CM) - Primary osteoarthritis of right knee  Evaluation Date: 7/30/2020  Authorization Period Expiration: 7/6/2021  Plan of Care Expiration: 8/3/2020  Visit # / Visits authorized: 2/3    Time In: 110pm  Time Out: 205pm  Total Billable Time: 55 minutes (2TE + 1MT, ice applied for 10 minutes)    Precautions: Standard    Subjective     Pt reports: she is having minimal pain in the popliteal fossa. She took some pain medication earlier today. She continues to ice and elevate her R LE for edema control.  She was compliant with home exercise program.  Response to previous treatment: decreased pain  Functional change: none    Pain: 2/10  Location: right knee      Objective     Lora received therapeutic exercises to develop strength, ROM and flexibility for 30 minutes including:    Calf stretch 4 x 30"  Quad sets with towel under knee 3 x 10  Glut sets 3 x10  Ball squeeze 3 x 10  Ankle pumps 50x  Supine hip abduction 3 x 10  PROM into extension, 4 x 30"    Lora received the following manual therapy techniques: Joint mobilizations and Soft tissue Mobilization were applied to the: R knee for 15 minutes, including:  Patella mobs in all directions  STM to anterior tib, gastroc, hamstring and quad      Lora received cold pack for 10 minutes to decrease pain and edema.      Home Exercises Provided and Patient Education Provided     Education provided:   - HEP, bed mobility, gait pattern    Written Home Exercises Provided: Patient instructed to cont prior HEP.  Exercises were reviewed and Lora was able to demonstrate them prior to the end of the session.  Lora " demonstrated good  understanding of the education provided.     See EMR under Patient Instructions for exercises provided prior visit.    Assessment     Pt tolerated all treatment well today. She had some discomfort in the popliteal fossa with her quad sets. She also reported a burning sensation in the anterior aspect of the shin, just distal to the knee. Pt was educated on using the L LE to assist with bed mobility, daily duration of ice, and proper gait pattern.   Lora is progressing well towards her goals.   Pt prognosis is Good.     Pt will continue to benefit from skilled outpatient physical therapy to address the deficits listed in the problem list box on initial evaluation, provide pt/family education and to maximize pt's level of independence in the home and community environment.     Pt's spiritual, cultural and educational needs considered and pt agreeable to plan of care and goals.     Anticipated barriers to physical therapy: none    Goals:  Short Term Goals (3 Visits):   1. Pt will be compliant with HEP to supplement PT in restoring pain free function. (MET)  2. Pt will improve TUG test to 22 sec to improve walking speed for functional community ambulation (progressing)  3. Pt will improve 30s STS test to 8 reps to improve functional LE strength. (progressing)  4. Pt improve impaired knee AROM 0-90 deg to improve mobility for normal movement patterns. (progressing)     Plan     Plan of care Certification: 7/30/2020 to 8/3/2020.     Outpatient Physical Therapy 3 times weekly for 1 weeks to include the following interventions: Gait Training, Manual Therapy, Moist Heat/ Ice, Neuromuscular Re-ed, Patient Education, Therapeutic Activites and Therapeutic Exercise.     LOGAN LUO, PT

## 2020-07-31 NOTE — PROGRESS NOTES
7/31/2020 1241    Called and spoke with patient. Reported On-Q pump removed today without difficulty as medication was completed. Stated that blue tip to end of catheter remained intact upon removal. Currently rating pain a 2/10 to right knee. Denies any other concerns at this time.

## 2020-07-31 NOTE — ADDENDUM NOTE
Addendum  created 07/31/20 1241 by Severiano Cook RN    Clinical Note Signed, Intraprocedure Event edited

## 2020-08-01 NOTE — TELEPHONE ENCOUNTER
[id number] - 0028 Visited patient in [facility],  present, s/p R TKA 7-28-20, reports pain 3/10, + BM yesterday, dressing c/d/i, no s/s of infection, swelling noted, therapy today @ 1330, Polar Ice machine not intact, On Q Pump d/c'd today, reports taking medications as prescribed, reminded to perform home exercises as instructed by PT, ice elevate, and to call Patient  or the resident on call for any concerns, understanding verbalized.

## 2020-08-03 ENCOUNTER — CLINICAL SUPPORT (OUTPATIENT)
Dept: REHABILITATION | Facility: HOSPITAL | Age: 65
End: 2020-08-03
Payer: COMMERCIAL

## 2020-08-03 ENCOUNTER — DOCUMENTATION ONLY (OUTPATIENT)
Dept: REHABILITATION | Facility: HOSPITAL | Age: 65
End: 2020-08-03

## 2020-08-03 ENCOUNTER — OFFICE VISIT (OUTPATIENT)
Dept: ORTHOPEDICS | Facility: CLINIC | Age: 65
End: 2020-08-03
Payer: COMMERCIAL

## 2020-08-03 DIAGNOSIS — M25.661 DECREASED ROM OF RIGHT KNEE: ICD-10-CM

## 2020-08-03 DIAGNOSIS — Z96.651 STATUS POST RIGHT KNEE REPLACEMENT: Primary | ICD-10-CM

## 2020-08-03 PROCEDURE — 97140 MANUAL THERAPY 1/> REGIONS: CPT | Mod: PO | Performed by: PHYSICAL THERAPIST

## 2020-08-03 PROCEDURE — 99999 PR PBB SHADOW E&M-EST. PATIENT-LVL III: ICD-10-PCS | Mod: PBBFAC,COE,, | Performed by: NURSE PRACTITIONER

## 2020-08-03 PROCEDURE — 97110 THERAPEUTIC EXERCISES: CPT | Mod: PO | Performed by: PHYSICAL THERAPIST

## 2020-08-03 PROCEDURE — 99999 PR PBB SHADOW E&M-EST. PATIENT-LVL III: CPT | Mod: PBBFAC,COE,, | Performed by: NURSE PRACTITIONER

## 2020-08-03 PROCEDURE — 99024 PR POST-OP FOLLOW-UP VISIT: ICD-10-PCS | Mod: S$GLB,COE,, | Performed by: NURSE PRACTITIONER

## 2020-08-03 PROCEDURE — 99024 POSTOP FOLLOW-UP VISIT: CPT | Mod: S$GLB,COE,, | Performed by: NURSE PRACTITIONER

## 2020-08-03 NOTE — PROGRESS NOTES
Outpatient Therapy Discharge Summary     Name: Lora Goins  Clinic Number: [id number]    Therapy Diagnosis:   Diagnosis:        Encounter Diagnoses   Name Primary?    Primary osteoarthritis of right knee      Decreased ROM of right knee       Physician: Doctor, Outside     Physician Orders: PT Eval and Treat (Direct to Outpatient TKA)  Medical Diagnosis from Referral: M17.11 (ICD-10-CM) - Primary osteoarthritis of right knee  Evaluation Date: 7/30/2020      Date of Last visit: 8/3/2020  Total Visits Received: 3  Cancelled Visits: 0  No Show Visits: 0    Assessment    Goals:Short Term Goals (3 Visits):   1. Pt will be compliant with HEP to supplement PT in restoring pain free function.  2. Pt will improve TUG test to 22 sec to improve walking speed for functional community ambulation  3. Pt will improve 30s STS test to 8 reps to improve functional LE strength.  4. Pt improve impaired knee AROM 0-90 deg to improve mobility for normal movement patterns.       Discharge reason: Patient has completed allowable visits authorized by insurance and Other:  [facility] TKE program.     Plan   This patient is discharged from Physical Therapy    Wai Jacob PT

## 2020-08-03 NOTE — PROGRESS NOTES
"  Physical Therapy Daily Treatment Note     Name: Lora Goins  Clinic Number: [id number]    Therapy Diagnosis:   Encounter Diagnosis   Name Primary?    Decreased ROM of right knee      Physician: Doctor, Outside    Visit Date: 8/3/2020    Physician Orders: PT Eval and Treat (Direct to Outpatient TKA)  Medical Diagnosis from Referral: M17.11 (ICD-10-CM) - Primary osteoarthritis of right knee  Evaluation Date: 7/30/2020  Authorization Period Expiration: 7/6/2021  Plan of Care Expiration: 8/3/2020  Visit # / Visits authorized: 2/3    Time In: 828 AM  Time Out: 915  Total Billable Time: 47  minutes (3TE, MT ice applied for 10 minutes)    Precautions: Standard    Subjective     Pt reports: the knee is hurting over the top and at the bottom behind the knee. Got out of bed and the knee started to hurt. It was good over the weekend.      She was compliant with home exercise program. Did the exercises over the weekend.   Response to previous treatment: felt all right .   Functional change: none    Pain:5/10  Location: right knee      Objective     Lora received therapeutic exercises to develop strength, ROM and flexibility for 40  minutes including:    Calf stretch 6 x 20"  Quad sets with towel under knee 3 x 10 7" hold   Glut sets / Ball squeeze hold 6" x15  Ankle pumps 50x   Supine hip abduction 3 x 10  PROM into extension, 4 x 30"    Lora received the following manual therapy techniques: Joint mobilizations and Soft tissue Mobilization were applied to the: R knee for 8 minutes, including:  Patella mobs in all directions  STM to anterior tib, gastroc, hamstring and quad      Lora received cold pack for 10 minutes to decrease pain and edema.      Home Exercises Provided and Patient Education Provided     Education provided:   - HEP, bed mobility, gait pattern    Written Home Exercises Provided: Patient instructed to cont prior HEP.  Exercises were reviewed and Lora was able to demonstrate them prior to the end " of the session.  Lora demonstrated good  understanding of the education provided.     See EMR under Patient Instructions for exercises provided prior visit.    Assessment     The patient performed and completed the activities as noted. She exhibited some initial pain that was soft tissue in origin however this was eliminated by sessions end after joint and ST mobilizations. She is quad deficient as far as quality of the quad contraction. Patient was able to execute a SAQ by the end of the session. This was the patients last session and is DC to further therapy in her home town.       Pt prognosis is Good.     Pt will continue to benefit from skilled outpatient physical therapy to address the deficits listed in the problem list box on initial evaluation, provide pt/family education and to maximize pt's level of independence in the home and community environment.     Pt's spiritual, cultural and educational needs considered and pt agreeable to plan of care and goals.     Anticipated barriers to physical therapy: none    Goals:  Short Term Goals (3 Visits):   1. Pt will be compliant with HEP to supplement PT in restoring pain free function. (MET)  2. Pt will improve TUG test to 22 sec to improve walking speed for functional community ambulation (progressing)  3. Pt will improve 30s STS test to 8 reps to improve functional LE strength. (progressing)  4. Pt improve impaired knee AROM 0-90 deg to improve mobility for normal movement patterns. (progressing)     Plan     Plan of care Certification: 7/30/2020 to 8/3/2020.     Outpatient Physical Therapy 3 times weekly for 1 weeks to include the following interventions: Gait Training, Manual Therapy, Moist Heat/ Ice, Neuromuscular Re-ed, Patient Education, Therapeutic Activites and Therapeutic Exercise.     PATIENT IS :DC FROM PT     Wai Jacob, PT

## 2020-08-03 NOTE — PROGRESS NOTES
Lora Goins presents for initial post-operative visit following a right total knee arthroplasty performed by Dr. Elkins on 7/28/2020.  She is one week post op. She is in the [facility] program.      Exam:   Ambulating well with assistive device.  Incision is clean and dry without drainage or erythema.   ROM:5-90    Initial post-operative radiographs reviewed today revealing a well fixed and aligned prosthesis.    A/P:  1 week s/p right total knee replacement  Dr. Elkins interviewed and examined patient today and agrees with plan.   - The patient was advised to keep the incision clean and dry for the next 7 days after which she may wash the area with antibacterial soap in the shower. Will not submerge until the incision is completely healed.   - Outpatient PT: pt will continue at home after discharge  - Continue aspirin for 1 month from surgery.  - Reviewed antibiotic prophylaxis  - Pain medication: refill not needed. Pt is taking celebrex for pain as needed  - Follow up with pcp as scheduled. Pt can f/u with this clinic as needed as she only lives an hour and 1/2 away. Pt will call clinic with problems/concerns.

## 2020-08-04 ENCOUNTER — TELEPHONE (OUTPATIENT)
Dept: ORTHOPEDICS | Facility: CLINIC | Age: 65
End: 2020-08-04

## 2020-08-04 NOTE — TELEPHONE ENCOUNTER
"Spoke with patient, she stated that she called her therapy location and they did not have a "prescription" for her.  Writer verified the therapy location and faxed the order and PT notes to the location.    Phoned therapy location, left a message requesting a return call to verify receipt of above.    [facility]  [address] [place] [address], [place]  [address]  Phone: [phone]  Fax: [phone]    Phoned Ms. Paulino to inform of above.  "

## 2020-08-26 ENCOUNTER — HISTORICAL (OUTPATIENT)
Dept: RADIOLOGY | Facility: HOSPITAL | Age: 65
End: 2020-08-26

## 2020-09-08 ENCOUNTER — TELEPHONE (OUTPATIENT)
Dept: ORTHOPEDICS | Facility: CLINIC | Age: 65
End: 2020-09-08

## 2020-09-08 NOTE — TELEPHONE ENCOUNTER
Spoke with patient, informed that a message was received from a Dr. Hollis regarding her knee, she stated that on Wednesday, the therapist noticed the redness and stated that it looked like a stitch was coming out and that he would watch it, the area continually worsened with redness, by Sunday, the area was really red and concerned patient, her son saw it and encouraged her to get it look at, she went to the clinic and they sent her to the hospital. The said he wasn't concerned and put some Neosporin on the area.     The patient stated that the area is looking better and she is no longer concerned.    Her surgery was on July 28th surgery.    Writer informed that Dr. Elkins will be notified.

## 2020-09-15 ENCOUNTER — HISTORICAL (OUTPATIENT)
Dept: LAB | Facility: HOSPITAL | Age: 65
End: 2020-09-15

## 2020-10-07 ENCOUNTER — PATIENT MESSAGE (OUTPATIENT)
Dept: ORTHOPEDICS | Facility: CLINIC | Age: 65
End: 2020-10-07

## 2020-10-13 ENCOUNTER — PATIENT OUTREACH (OUTPATIENT)
Dept: ORTHOPEDICS | Facility: CLINIC | Age: 65
End: 2020-10-13

## 2020-12-08 NOTE — PLAN OF CARE
Problem: Physical Therapy Goal  Goal: Physical Therapy Goal  Goals to be met by: 19    Patient will increase functional independence with mobility by performin. Supine to sit with supervision  2. Sit to supine with supervision  3. Sit to stand transfer with Supervision  4. Gait x200 feet with Supervision using Rolling Walker  5. Ascend/Descend 1 curb step with Supervision using Rolling Walker  6. Lower extremity exercise program x30 reps per handout, with supervision       Outcome: Outcome(s) achieved Date Met: 19    Patient tolerated PT session well today. She ambulated 140ft and 20ft with SBA and RW. She performed L LE therex x10 reps. She is ready for discharge from PT standpoint.        none

## 2021-07-28 ENCOUNTER — PATIENT OUTREACH (OUTPATIENT)
Dept: ORTHOPEDICS | Facility: CLINIC | Age: 66
End: 2021-07-28

## 2022-06-12 NOTE — ASSESSMENT & PLAN NOTE
Possible sleep apnea- I suggest a sleep study and suggest caution with usage of medication that can cause respiratory suppression in the perioperative period     potential ramifications of untreated sleep apnea, which could include daytime sleepiness, hypertension, heart disease and stroke were discussed     Avoidance of  supine sleep, weight gain , alcoholic beverages , care with , sedative , CNS depressant use indicated  since all of these can worsen GOLDIE    She deferred sleep evaluation post op        Routing refill request to provider for review/approval because:  Drug not on the G refill protocol   traMADol (ULTRAM) 50 MG tablet 120 tablet 0 5/12/2022  No   Sig - Route: TAKE 1 TABLET (50 MG) BY MOUTH EVERY 6 HOURS AS NEEDED FOR SEVERE PAIN - Oral     Last OV-3/1/22

## 2022-12-06 DIAGNOSIS — Z78.0 ASYMPTOMATIC MENOPAUSAL STATE: Primary | ICD-10-CM

## 2022-12-06 DIAGNOSIS — M81.0 OSTEOPOROSIS, UNSPECIFIED OSTEOPOROSIS TYPE, UNSPECIFIED PATHOLOGICAL FRACTURE PRESENCE: ICD-10-CM

## 2023-01-12 ENCOUNTER — HOSPITAL ENCOUNTER (OUTPATIENT)
Dept: RADIOLOGY | Facility: HOSPITAL | Age: 68
Discharge: HOME | End: 2023-01-12
Attending: NURSE PRACTITIONER
Payer: MEDICARE

## 2023-01-12 DIAGNOSIS — Z78.0 ASYMPTOMATIC MENOPAUSAL STATE: ICD-10-CM

## 2023-01-12 DIAGNOSIS — M81.0 OSTEOPOROSIS, UNSPECIFIED OSTEOPOROSIS TYPE, UNSPECIFIED PATHOLOGICAL FRACTURE PRESENCE: ICD-10-CM

## 2023-01-12 PROCEDURE — 77080 DXA BONE DENSITY AXIAL: CPT | Mod: TC

## 2023-05-03 ENCOUNTER — HOSPITAL ENCOUNTER (OUTPATIENT)
Dept: RADIOLOGY | Facility: HOSPITAL | Age: 68
Discharge: HOME | End: 2023-05-03
Attending: NURSE PRACTITIONER
Payer: MEDICARE

## 2023-05-03 DIAGNOSIS — M25.561 RIGHT KNEE PAIN: ICD-10-CM

## 2023-05-03 DIAGNOSIS — Z98.890 HISTORY OF KNEE SURGERY: ICD-10-CM

## 2023-05-03 DIAGNOSIS — M25.561 RIGHT KNEE PAIN: Primary | ICD-10-CM

## 2023-05-03 PROCEDURE — 73560 X-RAY EXAM OF KNEE 1 OR 2: CPT | Mod: TC,RT

## 2023-05-16 ENCOUNTER — LAB VISIT (OUTPATIENT)
Dept: LAB | Facility: HOSPITAL | Age: 68
End: 2023-05-16
Attending: NURSE PRACTITIONER
Payer: MEDICARE

## 2023-05-16 DIAGNOSIS — Z00.00 ROUTINE GENERAL MEDICAL EXAMINATION AT A HEALTH CARE FACILITY: Primary | ICD-10-CM

## 2023-05-16 DIAGNOSIS — R73.01 IMPAIRED FASTING GLUCOSE: ICD-10-CM

## 2023-05-16 DIAGNOSIS — E78.2 MIXED HYPERLIPIDEMIA: ICD-10-CM

## 2023-05-16 LAB
ALBUMIN SERPL-MCNC: 3.7 G/DL (ref 3.4–4.8)
ALBUMIN/GLOB SERPL: 1.1 RATIO (ref 1.1–2)
ALP SERPL-CCNC: 66 UNIT/L (ref 40–150)
ALT SERPL-CCNC: 19 UNIT/L (ref 0–55)
AST SERPL-CCNC: 28 UNIT/L (ref 5–34)
BILIRUBIN DIRECT+TOT PNL SERPL-MCNC: 0.9 MG/DL
BUN SERPL-MCNC: 16.1 MG/DL (ref 9.8–20.1)
CALCIUM SERPL-MCNC: 9.2 MG/DL (ref 8.4–10.2)
CHLORIDE SERPL-SCNC: 107 MMOL/L (ref 98–107)
CHOLEST SERPL-MCNC: 215 MG/DL
CHOLEST/HDLC SERPL: 4 {RATIO} (ref 0–5)
CO2 SERPL-SCNC: 26 MMOL/L (ref 23–31)
CREAT SERPL-MCNC: 0.86 MG/DL (ref 0.55–1.02)
EST. AVERAGE GLUCOSE BLD GHB EST-MCNC: 114 MG/DL
GFR SERPLBLD CREATININE-BSD FMLA CKD-EPI: >60 MLS/MIN/1.73/M2
GLOBULIN SER-MCNC: 3.3 GM/DL (ref 2.4–3.5)
GLUCOSE SERPL-MCNC: 100 MG/DL (ref 82–115)
HBA1C MFR BLD: 5.6 %
HDLC SERPL-MCNC: 55 MG/DL (ref 35–60)
LDLC SERPL CALC-MCNC: 134 MG/DL (ref 50–140)
POTASSIUM SERPL-SCNC: 4.5 MMOL/L (ref 3.5–5.1)
PROT SERPL-MCNC: 7 GM/DL (ref 5.8–7.6)
SODIUM SERPL-SCNC: 143 MMOL/L (ref 136–145)
TRIGL SERPL-MCNC: 129 MG/DL (ref 37–140)
VLDLC SERPL CALC-MCNC: 26 MG/DL

## 2023-05-16 PROCEDURE — 83036 HEMOGLOBIN GLYCOSYLATED A1C: CPT

## 2023-05-16 PROCEDURE — 80061 LIPID PANEL: CPT

## 2023-05-16 PROCEDURE — 36415 COLL VENOUS BLD VENIPUNCTURE: CPT

## 2023-05-16 PROCEDURE — 80053 COMPREHEN METABOLIC PANEL: CPT

## 2023-06-01 ENCOUNTER — LAB VISIT (OUTPATIENT)
Dept: LAB | Facility: HOSPITAL | Age: 68
End: 2023-06-01
Attending: NURSE PRACTITIONER
Payer: MEDICARE

## 2023-06-01 DIAGNOSIS — Z00.00 ROUTINE GENERAL MEDICAL EXAMINATION AT A HEALTH CARE FACILITY: Primary | ICD-10-CM

## 2023-06-01 DIAGNOSIS — Z79.899 ENCOUNTER FOR LONG-TERM (CURRENT) USE OF OTHER MEDICATIONS: ICD-10-CM

## 2023-06-01 DIAGNOSIS — Z86.39 PERSONAL HISTORY OF NUTRITIONAL DEFICIENCY: ICD-10-CM

## 2023-06-01 LAB
BASOPHILS # BLD AUTO: 0.03 X10(3)/MCL
BASOPHILS NFR BLD AUTO: 0.6 %
EOSINOPHIL # BLD AUTO: 0.16 X10(3)/MCL (ref 0–0.9)
EOSINOPHIL NFR BLD AUTO: 3.3 %
ERYTHROCYTE [DISTWIDTH] IN BLOOD BY AUTOMATED COUNT: 14.7 % (ref 11.5–17)
FOLATE SERPL-MCNC: 11.3 NG/ML (ref 7–31.4)
HCT VFR BLD AUTO: 41.8 % (ref 37–47)
HGB BLD-MCNC: 12.7 G/DL (ref 12–16)
IMM GRANULOCYTES # BLD AUTO: 0.01 X10(3)/MCL (ref 0–0.04)
IMM GRANULOCYTES NFR BLD AUTO: 0.2 %
IRON SATN MFR SERPL: 17 % (ref 20–50)
IRON SERPL-MCNC: 60 UG/DL (ref 50–170)
LYMPHOCYTES # BLD AUTO: 1.66 X10(3)/MCL (ref 0.6–4.6)
LYMPHOCYTES NFR BLD AUTO: 34 %
MCH RBC QN AUTO: 25.1 PG (ref 27–31)
MCHC RBC AUTO-ENTMCNC: 30.4 G/DL (ref 33–36)
MCV RBC AUTO: 82.8 FL (ref 80–94)
MONOCYTES # BLD AUTO: 0.39 X10(3)/MCL (ref 0.1–1.3)
MONOCYTES NFR BLD AUTO: 8 %
NEUTROPHILS # BLD AUTO: 2.63 X10(3)/MCL (ref 2.1–9.2)
NEUTROPHILS NFR BLD AUTO: 53.9 %
PLATELET # BLD AUTO: 244 X10(3)/MCL (ref 130–400)
PMV BLD AUTO: 10.3 FL (ref 7.4–10.4)
RBC # BLD AUTO: 5.05 X10(6)/MCL (ref 4.2–5.4)
TIBC SERPL-MCNC: 293 UG/DL (ref 70–310)
TIBC SERPL-MCNC: 353 UG/DL (ref 250–450)
TRANSFERRIN SERPL-MCNC: 321 MG/DL (ref 173–360)
VIT B12 SERPL-MCNC: 1295 PG/ML (ref 213–816)
WBC # SPEC AUTO: 4.88 X10(3)/MCL (ref 4.5–11.5)

## 2023-06-01 PROCEDURE — 83550 IRON BINDING TEST: CPT

## 2023-06-01 PROCEDURE — 82746 ASSAY OF FOLIC ACID SERUM: CPT

## 2023-06-01 PROCEDURE — 82607 VITAMIN B-12: CPT

## 2023-06-01 PROCEDURE — 85025 COMPLETE CBC W/AUTO DIFF WBC: CPT

## 2023-06-01 PROCEDURE — 36415 COLL VENOUS BLD VENIPUNCTURE: CPT

## 2023-12-04 ENCOUNTER — LAB VISIT (OUTPATIENT)
Dept: LAB | Facility: HOSPITAL | Age: 68
End: 2023-12-04
Attending: NURSE PRACTITIONER
Payer: MEDICARE

## 2023-12-04 DIAGNOSIS — E78.2 MIXED HYPERLIPIDEMIA: ICD-10-CM

## 2023-12-04 DIAGNOSIS — R73.01 IMPAIRED FASTING GLUCOSE: ICD-10-CM

## 2023-12-04 DIAGNOSIS — Z00.00 ROUTINE GENERAL MEDICAL EXAMINATION AT A HEALTH CARE FACILITY: Primary | ICD-10-CM

## 2023-12-04 LAB
ALBUMIN SERPL-MCNC: 3.3 G/DL (ref 3.4–4.8)
ALBUMIN/GLOB SERPL: 0.9 RATIO (ref 1.1–2)
ALP SERPL-CCNC: 77 UNIT/L (ref 40–150)
ALT SERPL-CCNC: 31 UNIT/L (ref 0–55)
AST SERPL-CCNC: 27 UNIT/L (ref 5–34)
BASOPHILS # BLD AUTO: 0.04 X10(3)/MCL
BASOPHILS NFR BLD AUTO: 0.8 %
BILIRUB SERPL-MCNC: 0.7 MG/DL
BUN SERPL-MCNC: 18.9 MG/DL (ref 9.8–20.1)
CALCIUM SERPL-MCNC: 9.2 MG/DL (ref 8.4–10.2)
CHLORIDE SERPL-SCNC: 102 MMOL/L (ref 98–107)
CHOLEST SERPL-MCNC: 180 MG/DL
CHOLEST/HDLC SERPL: 4 {RATIO} (ref 0–5)
CO2 SERPL-SCNC: 30 MMOL/L (ref 23–31)
CREAT SERPL-MCNC: 0.89 MG/DL (ref 0.55–1.02)
DEPRECATED CALCIDIOL+CALCIFEROL SERPL-MC: 24.8 NG/ML (ref 30–80)
EOSINOPHIL # BLD AUTO: 0.19 X10(3)/MCL (ref 0–0.9)
EOSINOPHIL NFR BLD AUTO: 4 %
ERYTHROCYTE [DISTWIDTH] IN BLOOD BY AUTOMATED COUNT: 14.2 % (ref 11.5–17)
EST. AVERAGE GLUCOSE BLD GHB EST-MCNC: 128.4 MG/DL
GFR SERPLBLD CREATININE-BSD FMLA CKD-EPI: >60 MLS/MIN/1.73/M2
GLOBULIN SER-MCNC: 3.5 GM/DL (ref 2.4–3.5)
GLUCOSE SERPL-MCNC: 98 MG/DL (ref 82–115)
HBA1C MFR BLD: 6.1 %
HCT VFR BLD AUTO: 42.5 % (ref 37–47)
HDLC SERPL-MCNC: 45 MG/DL (ref 35–60)
HGB BLD-MCNC: 13.1 G/DL (ref 12–16)
IMM GRANULOCYTES # BLD AUTO: 0 X10(3)/MCL (ref 0–0.04)
IMM GRANULOCYTES NFR BLD AUTO: 0 %
LDLC SERPL CALC-MCNC: 106 MG/DL (ref 50–140)
LYMPHOCYTES # BLD AUTO: 1.56 X10(3)/MCL (ref 0.6–4.6)
LYMPHOCYTES NFR BLD AUTO: 32.6 %
MCH RBC QN AUTO: 26.1 PG (ref 27–31)
MCHC RBC AUTO-ENTMCNC: 30.8 G/DL (ref 33–36)
MCV RBC AUTO: 84.8 FL (ref 80–94)
MONOCYTES # BLD AUTO: 0.39 X10(3)/MCL (ref 0.1–1.3)
MONOCYTES NFR BLD AUTO: 8.2 %
NEUTROPHILS # BLD AUTO: 2.6 X10(3)/MCL (ref 2.1–9.2)
NEUTROPHILS NFR BLD AUTO: 54.4 %
PLATELET # BLD AUTO: 261 X10(3)/MCL (ref 130–400)
PMV BLD AUTO: 10.2 FL (ref 7.4–10.4)
POTASSIUM SERPL-SCNC: 4 MMOL/L (ref 3.5–5.1)
PROT SERPL-MCNC: 6.8 GM/DL (ref 5.8–7.6)
RBC # BLD AUTO: 5.01 X10(6)/MCL (ref 4.2–5.4)
SODIUM SERPL-SCNC: 139 MMOL/L (ref 136–145)
T4 FREE SERPL-MCNC: 0.93 NG/DL (ref 0.7–1.48)
TRIGL SERPL-MCNC: 143 MG/DL (ref 37–140)
TSH SERPL-ACNC: 1.07 UIU/ML (ref 0.35–4.94)
VLDLC SERPL CALC-MCNC: 29 MG/DL
WBC # SPEC AUTO: 4.78 X10(3)/MCL (ref 4.5–11.5)

## 2023-12-04 PROCEDURE — 85025 COMPLETE CBC W/AUTO DIFF WBC: CPT

## 2023-12-04 PROCEDURE — 80053 COMPREHEN METABOLIC PANEL: CPT

## 2023-12-04 PROCEDURE — 82306 VITAMIN D 25 HYDROXY: CPT

## 2023-12-04 PROCEDURE — 83036 HEMOGLOBIN GLYCOSYLATED A1C: CPT

## 2023-12-04 PROCEDURE — 80061 LIPID PANEL: CPT

## 2023-12-04 PROCEDURE — 36415 COLL VENOUS BLD VENIPUNCTURE: CPT

## 2023-12-04 PROCEDURE — 84439 ASSAY OF FREE THYROXINE: CPT

## 2023-12-04 PROCEDURE — 84443 ASSAY THYROID STIM HORMONE: CPT

## 2024-01-12 ENCOUNTER — HOSPITAL ENCOUNTER (OUTPATIENT)
Dept: RADIOLOGY | Facility: HOSPITAL | Age: 69
Discharge: HOME | End: 2024-01-12
Attending: NURSE PRACTITIONER
Payer: MEDICARE

## 2024-01-12 DIAGNOSIS — R05.1 ACUTE COUGH: Primary | ICD-10-CM

## 2024-01-12 DIAGNOSIS — R05.1 ACUTE COUGH: ICD-10-CM

## 2024-01-12 PROCEDURE — 71046 X-RAY EXAM CHEST 2 VIEWS: CPT | Mod: TC

## 2024-06-14 ENCOUNTER — LAB VISIT (OUTPATIENT)
Dept: LAB | Facility: HOSPITAL | Age: 69
End: 2024-06-14
Attending: NURSE PRACTITIONER
Payer: MEDICARE

## 2024-06-14 DIAGNOSIS — Z79.899 ENCOUNTER FOR LONG-TERM (CURRENT) USE OF OTHER MEDICATIONS: ICD-10-CM

## 2024-06-14 DIAGNOSIS — E55.9 AVITAMINOSIS D: ICD-10-CM

## 2024-06-14 DIAGNOSIS — Z00.00 ROUTINE GENERAL MEDICAL EXAMINATION AT A HEALTH CARE FACILITY: Primary | ICD-10-CM

## 2024-06-14 LAB
25(OH)D3+25(OH)D2 SERPL-MCNC: 37 NG/ML (ref 30–80)
ALBUMIN SERPL-MCNC: 3.6 G/DL (ref 3.4–4.8)
ALBUMIN/GLOB SERPL: 1.1 RATIO (ref 1.1–2)
ALP SERPL-CCNC: 73 UNIT/L (ref 40–150)
ALT SERPL-CCNC: 22 UNIT/L (ref 0–55)
ANION GAP SERPL CALC-SCNC: 6 MEQ/L
AST SERPL-CCNC: 23 UNIT/L (ref 5–34)
BASOPHILS # BLD AUTO: 0.03 X10(3)/MCL
BASOPHILS NFR BLD AUTO: 0.7 %
BILIRUB SERPL-MCNC: 0.8 MG/DL
BUN SERPL-MCNC: 21.2 MG/DL (ref 9.8–20.1)
CALCIUM SERPL-MCNC: 9.6 MG/DL (ref 8.4–10.2)
CHLORIDE SERPL-SCNC: 104 MMOL/L (ref 98–107)
CHOLEST SERPL-MCNC: 221 MG/DL
CHOLEST/HDLC SERPL: 4 {RATIO} (ref 0–5)
CO2 SERPL-SCNC: 30 MMOL/L (ref 23–31)
CREAT SERPL-MCNC: 0.93 MG/DL (ref 0.55–1.02)
CREAT/UREA NIT SERPL: 23
EOSINOPHIL # BLD AUTO: 0.17 X10(3)/MCL (ref 0–0.9)
EOSINOPHIL NFR BLD AUTO: 3.8 %
ERYTHROCYTE [DISTWIDTH] IN BLOOD BY AUTOMATED COUNT: 14.1 % (ref 11.5–17)
EST. AVERAGE GLUCOSE BLD GHB EST-MCNC: 116.9 MG/DL
GFR SERPLBLD CREATININE-BSD FMLA CKD-EPI: >60 ML/MIN/1.73/M2
GLOBULIN SER-MCNC: 3.2 GM/DL (ref 2.4–3.5)
GLUCOSE SERPL-MCNC: 103 MG/DL (ref 82–115)
HBA1C MFR BLD: 5.7 %
HCT VFR BLD AUTO: 42.3 % (ref 37–47)
HDLC SERPL-MCNC: 53 MG/DL (ref 35–60)
HGB BLD-MCNC: 13.7 G/DL (ref 12–16)
IMM GRANULOCYTES # BLD AUTO: 0 X10(3)/MCL (ref 0–0.04)
IMM GRANULOCYTES NFR BLD AUTO: 0 %
IRON SATN MFR SERPL: 20 % (ref 20–50)
IRON SERPL-MCNC: 68 UG/DL (ref 50–170)
LDLC SERPL CALC-MCNC: 133 MG/DL (ref 50–140)
LYMPHOCYTES # BLD AUTO: 1.77 X10(3)/MCL (ref 0.6–4.6)
LYMPHOCYTES NFR BLD AUTO: 39.2 %
MCH RBC QN AUTO: 27.7 PG (ref 27–31)
MCHC RBC AUTO-ENTMCNC: 32.4 G/DL (ref 33–36)
MCV RBC AUTO: 85.5 FL (ref 80–94)
MONOCYTES # BLD AUTO: 0.42 X10(3)/MCL (ref 0.1–1.3)
MONOCYTES NFR BLD AUTO: 9.3 %
NEUTROPHILS # BLD AUTO: 2.12 X10(3)/MCL (ref 2.1–9.2)
NEUTROPHILS NFR BLD AUTO: 47 %
PLATELET # BLD AUTO: 215 X10(3)/MCL (ref 130–400)
PMV BLD AUTO: 10.1 FL (ref 7.4–10.4)
POTASSIUM SERPL-SCNC: 4.6 MMOL/L (ref 3.5–5.1)
PROT SERPL-MCNC: 6.8 GM/DL (ref 5.8–7.6)
RBC # BLD AUTO: 4.95 X10(6)/MCL (ref 4.2–5.4)
SODIUM SERPL-SCNC: 140 MMOL/L (ref 136–145)
T4 FREE SERPL-MCNC: 0.86 NG/DL (ref 0.7–1.48)
TIBC SERPL-MCNC: 271 UG/DL (ref 70–310)
TIBC SERPL-MCNC: 339 UG/DL (ref 250–450)
TRANSFERRIN SERPL-MCNC: 314 MG/DL (ref 173–360)
TRIGL SERPL-MCNC: 176 MG/DL (ref 37–140)
TSH SERPL-ACNC: 1.76 UIU/ML (ref 0.35–4.94)
VIT B12 SERPL-MCNC: 596 PG/ML (ref 213–816)
VLDLC SERPL CALC-MCNC: 35 MG/DL
WBC # SPEC AUTO: 4.51 X10(3)/MCL (ref 4.5–11.5)

## 2024-06-14 PROCEDURE — 80061 LIPID PANEL: CPT

## 2024-06-14 PROCEDURE — 82306 VITAMIN D 25 HYDROXY: CPT

## 2024-06-14 PROCEDURE — 36415 COLL VENOUS BLD VENIPUNCTURE: CPT

## 2024-06-14 PROCEDURE — 83540 ASSAY OF IRON: CPT

## 2024-06-14 PROCEDURE — 82607 VITAMIN B-12: CPT

## 2024-06-14 PROCEDURE — 83036 HEMOGLOBIN GLYCOSYLATED A1C: CPT

## 2024-06-14 PROCEDURE — 80053 COMPREHEN METABOLIC PANEL: CPT

## 2024-06-14 PROCEDURE — 84439 ASSAY OF FREE THYROXINE: CPT

## 2024-06-14 PROCEDURE — 84443 ASSAY THYROID STIM HORMONE: CPT

## 2024-06-14 PROCEDURE — 85025 COMPLETE CBC W/AUTO DIFF WBC: CPT

## 2024-06-26 PROCEDURE — 87624 HPV HI-RISK TYP POOLED RSLT: CPT | Performed by: OBSTETRICS & GYNECOLOGY

## 2025-01-29 ENCOUNTER — LAB VISIT (OUTPATIENT)
Dept: LAB | Facility: HOSPITAL | Age: 70
End: 2025-01-29
Attending: NURSE PRACTITIONER
Payer: MEDICARE

## 2025-01-29 DIAGNOSIS — Z00.00 ROUTINE GENERAL MEDICAL EXAMINATION AT A HEALTH CARE FACILITY: Primary | ICD-10-CM

## 2025-01-29 DIAGNOSIS — I10 ESSENTIAL HYPERTENSION, MALIGNANT: ICD-10-CM

## 2025-01-29 DIAGNOSIS — R73.01 IMPAIRED FASTING GLUCOSE: ICD-10-CM

## 2025-01-29 LAB
25(OH)D3+25(OH)D2 SERPL-MCNC: 22 NG/ML (ref 30–80)
ALBUMIN SERPL-MCNC: 3.4 G/DL (ref 3.4–4.8)
ALBUMIN/GLOB SERPL: 1 RATIO (ref 1.1–2)
ALP SERPL-CCNC: 73 UNIT/L (ref 40–150)
ALT SERPL-CCNC: 15 UNIT/L (ref 0–55)
ANION GAP SERPL CALC-SCNC: 6 MEQ/L
AST SERPL-CCNC: 21 UNIT/L (ref 5–34)
BASOPHILS # BLD AUTO: 0.03 X10(3)/MCL
BASOPHILS NFR BLD AUTO: 0.6 %
BILIRUB SERPL-MCNC: 1 MG/DL
BUN SERPL-MCNC: 19.1 MG/DL (ref 9.8–20.1)
CALCIUM SERPL-MCNC: 9.3 MG/DL (ref 8.4–10.2)
CHLORIDE SERPL-SCNC: 106 MMOL/L (ref 98–107)
CHOLEST SERPL-MCNC: 208 MG/DL
CHOLEST/HDLC SERPL: 4 {RATIO} (ref 0–5)
CO2 SERPL-SCNC: 29 MMOL/L (ref 23–31)
CREAT SERPL-MCNC: 0.99 MG/DL (ref 0.55–1.02)
CREAT/UREA NIT SERPL: 19
EOSINOPHIL # BLD AUTO: 0.14 X10(3)/MCL (ref 0–0.9)
EOSINOPHIL NFR BLD AUTO: 2.8 %
ERYTHROCYTE [DISTWIDTH] IN BLOOD BY AUTOMATED COUNT: 12.9 % (ref 11.5–17)
EST. AVERAGE GLUCOSE BLD GHB EST-MCNC: 114 MG/DL
GFR SERPLBLD CREATININE-BSD FMLA CKD-EPI: >60 ML/MIN/1.73/M2
GLOBULIN SER-MCNC: 3.5 GM/DL (ref 2.4–3.5)
GLUCOSE SERPL-MCNC: 104 MG/DL (ref 82–115)
HBA1C MFR BLD: 5.6 %
HCT VFR BLD AUTO: 42.1 % (ref 37–47)
HDLC SERPL-MCNC: 56 MG/DL (ref 35–60)
HGB BLD-MCNC: 13.7 G/DL (ref 12–16)
IMM GRANULOCYTES # BLD AUTO: 0.01 X10(3)/MCL (ref 0–0.04)
IMM GRANULOCYTES NFR BLD AUTO: 0.2 %
LDLC SERPL CALC-MCNC: 129 MG/DL (ref 50–140)
LYMPHOCYTES # BLD AUTO: 1.35 X10(3)/MCL (ref 0.6–4.6)
LYMPHOCYTES NFR BLD AUTO: 27.3 %
MCH RBC QN AUTO: 28.5 PG (ref 27–31)
MCHC RBC AUTO-ENTMCNC: 32.5 G/DL (ref 33–36)
MCV RBC AUTO: 87.7 FL (ref 80–94)
MONOCYTES # BLD AUTO: 0.42 X10(3)/MCL (ref 0.1–1.3)
MONOCYTES NFR BLD AUTO: 8.5 %
NEUTROPHILS # BLD AUTO: 2.99 X10(3)/MCL (ref 2.1–9.2)
NEUTROPHILS NFR BLD AUTO: 60.6 %
PLATELET # BLD AUTO: 242 X10(3)/MCL (ref 130–400)
PMV BLD AUTO: 9.6 FL (ref 7.4–10.4)
POTASSIUM SERPL-SCNC: 4.3 MMOL/L (ref 3.5–5.1)
PROT SERPL-MCNC: 6.9 GM/DL (ref 5.8–7.6)
RBC # BLD AUTO: 4.8 X10(6)/MCL (ref 4.2–5.4)
SODIUM SERPL-SCNC: 141 MMOL/L (ref 136–145)
T4 FREE SERPL-MCNC: 1.24 NG/DL (ref 0.7–1.48)
TRIGL SERPL-MCNC: 114 MG/DL (ref 37–140)
TSH SERPL-ACNC: 1.44 UIU/ML (ref 0.35–4.94)
VLDLC SERPL CALC-MCNC: 23 MG/DL
WBC # BLD AUTO: 4.94 X10(3)/MCL (ref 4.5–11.5)

## 2025-01-29 PROCEDURE — 80053 COMPREHEN METABOLIC PANEL: CPT

## 2025-01-29 PROCEDURE — 83036 HEMOGLOBIN GLYCOSYLATED A1C: CPT

## 2025-01-29 PROCEDURE — 82306 VITAMIN D 25 HYDROXY: CPT

## 2025-01-29 PROCEDURE — 85025 COMPLETE CBC W/AUTO DIFF WBC: CPT

## 2025-01-29 PROCEDURE — 80061 LIPID PANEL: CPT

## 2025-01-29 PROCEDURE — 84443 ASSAY THYROID STIM HORMONE: CPT

## 2025-01-29 PROCEDURE — 36415 COLL VENOUS BLD VENIPUNCTURE: CPT

## 2025-01-29 PROCEDURE — 84439 ASSAY OF FREE THYROXINE: CPT

## 2025-02-12 DIAGNOSIS — Z13.820 ENCOUNTER FOR OSTEOPOROSIS SCREENING IN ASYMPTOMATIC POSTMENOPAUSAL PATIENT: Primary | ICD-10-CM

## 2025-02-12 DIAGNOSIS — Z78.0 ENCOUNTER FOR OSTEOPOROSIS SCREENING IN ASYMPTOMATIC POSTMENOPAUSAL PATIENT: Primary | ICD-10-CM

## 2025-02-13 DIAGNOSIS — Z87.891 PERSONAL HISTORY OF TOBACCO USE, PRESENTING HAZARDS TO HEALTH: Primary | ICD-10-CM

## 2025-02-14 ENCOUNTER — HOSPITAL ENCOUNTER (OUTPATIENT)
Dept: RADIOLOGY | Facility: HOSPITAL | Age: 70
Discharge: HOME | End: 2025-02-14
Attending: NURSE PRACTITIONER
Payer: MEDICARE

## 2025-02-14 DIAGNOSIS — Z78.0 ENCOUNTER FOR OSTEOPOROSIS SCREENING IN ASYMPTOMATIC POSTMENOPAUSAL PATIENT: ICD-10-CM

## 2025-02-14 DIAGNOSIS — Z13.820 ENCOUNTER FOR OSTEOPOROSIS SCREENING IN ASYMPTOMATIC POSTMENOPAUSAL PATIENT: ICD-10-CM

## 2025-02-14 PROCEDURE — 77080 DXA BONE DENSITY AXIAL: CPT | Mod: TC

## 2025-03-01 ENCOUNTER — HOSPITAL ENCOUNTER (EMERGENCY)
Facility: HOSPITAL | Age: 70
Discharge: HOME | End: 2025-03-01
Attending: EMERGENCY MEDICINE
Payer: MEDICARE

## 2025-03-01 VITALS
RESPIRATION RATE: 18 BRPM | WEIGHT: 171.94 LBS | SYSTOLIC BLOOD PRESSURE: 153 MMHG | TEMPERATURE: 98 F | BODY MASS INDEX: 31.24 KG/M2 | HEART RATE: 89 BPM | OXYGEN SATURATION: 95 % | DIASTOLIC BLOOD PRESSURE: 89 MMHG

## 2025-03-01 DIAGNOSIS — N30.91 HEMORRHAGIC CYSTITIS: Primary | ICD-10-CM

## 2025-03-01 LAB
BACTERIA #/AREA URNS AUTO: ABNORMAL /HPF
BILIRUB UR QL STRIP.AUTO: NEGATIVE
CLARITY UR: ABNORMAL
COLOR UR AUTO: ABNORMAL
GLUCOSE UR QL STRIP: NEGATIVE
HGB UR QL STRIP: ABNORMAL
KETONES UR QL STRIP: NEGATIVE
LEUKOCYTE ESTERASE UR QL STRIP: NEGATIVE
NITRITE UR QL STRIP: NEGATIVE
PH UR STRIP: 6 [PH]
PROT UR QL STRIP: 30
RBC #/AREA URNS AUTO: ABNORMAL /HPF
SP GR UR STRIP.AUTO: 1.02 (ref 1–1.03)
SQUAMOUS #/AREA URNS AUTO: ABNORMAL /HPF
UROBILINOGEN UR STRIP-ACNC: 0.2
WBC #/AREA URNS AUTO: ABNORMAL /HPF

## 2025-03-01 PROCEDURE — 81003 URINALYSIS AUTO W/O SCOPE: CPT | Performed by: EMERGENCY MEDICINE

## 2025-03-01 PROCEDURE — 99284 EMERGENCY DEPT VISIT MOD MDM: CPT

## 2025-03-01 RX ORDER — CEPHALEXIN 500 MG/1
500 CAPSULE ORAL 4 TIMES DAILY
Qty: 20 CAPSULE | Refills: 0 | Status: SHIPPED | OUTPATIENT
Start: 2025-03-01 | End: 2025-03-06

## 2025-03-01 RX ORDER — OXYBUTYNIN CHLORIDE 5 MG/1
5 TABLET ORAL EVERY 8 HOURS PRN
Qty: 20 TABLET | Refills: 0 | Status: SHIPPED | OUTPATIENT
Start: 2025-03-01 | End: 2026-03-01

## 2025-03-01 NOTE — ED PROVIDER NOTES
NAME:  Lora Goins  CSN:     [id number]  MRN:    [id number]  ADMIT DATE: 3/1/2025        eMERGENCY dEPARTMENT eNCOUnter    CHIEF COMPLAINT    Chief Complaint   Patient presents with    urinary incontenence     Started last night       HPI      Lora Goins is a 69 y.o. female who presents to the ED complains of dysuria and hematuria as well as bladder spasms with the urinary incontinence that started yesterday.  She denies any fevers vomiting or flank pain         ALLERGIES    Review of patient's allergies indicates:   Allergen Reactions    Ezetimibe-simvastatin      Cramps  Can take Ezetimibe    Lovastatin      cramps    Pitavastatin      cramps    Pravastatin      Muscle cramps       PAST MEDICAL HISTORY  Past Medical History:   Diagnosis Date    Arthritis     Charcot Kayla Tooth muscular atrophy     Fibromyalgia     GERD (gastroesophageal reflux disease)     Hyperlipidemia     Hypertension     Hypertension     Restless leg syndrome        SURGICAL HISTORY    Past Surgical History:   Procedure Laterality Date     SECTION      2     SECTION      FOOT SURGERY      JOINT REPLACEMENT      KNEE ARTHROPLASTY Right 2020    Procedure: ARTHROPLASTY, KNEE - [employer] [facility];  Surgeon: Chapito Elkins MD;  Location: [facility];  Service: Orthopedics;  Laterality: Right;    LITHOTRIPSY      around      REMOVAL OF PLANTAR WART USING LASER      SINUS SURGERY      2012    TONSILLECTOMY      TOTAL KNEE ARTHROPLASTY Left 2019    Procedure: ARTHROPLASTY, KNEE, TOTAL-[employer] [facility];  Surgeon: Chapito Elkins MD;  Location: [address];  Service: Orthopedics;  Laterality: Left;       SOCIAL HISTORY    Social History     Socioeconomic History    Marital status:    Tobacco Use    Smoking status: Former     Current packs/day: 0.00     Average packs/day: 0.5 packs/day for 30.0 years (15.0 ttl pk-yrs)     Types: Cigarettes     Start date: 1973     Quit date: 2003     Years since  quittin.5    Smokeless tobacco: Never   Substance and Sexual Activity    Alcohol use: Not Currently    Drug use: Never       FAMILY HISTORY    Family History   Problem Relation Name Age of Onset    Cancer Mother          brain tumor , hip, blood stream, lungs    Cancer Father      Cancer Sister          lung tumor       REVIEW OF SYSTEMS   ROS  All Systems otherwise negative except as noted in the History of Present Illness.        PHYSICAL EXAM    Reviewed Triage Note  VITAL SIGNS:   ED Triage Vitals [25 1132]   Encounter Vitals Group      BP (!) 153/89      Systolic BP Percentile       Diastolic BP Percentile       Pulse 89      Resp 18      Temp 98.2 °F (36.8 °C)      Temp Source Oral      SpO2 95 %      Weight 171 lb 15.3 oz      Height       Head Circumference       Peak Flow       Pain Score       Pain Loc       Pain Education       Exclude from Growth Chart        Patient Vitals for the past 24 hrs:   BP Temp Temp src Pulse Resp SpO2 Weight   25 1132 (!) 153/89 98.2 °F (36.8 °C) Oral 89 18 95 % 78 kg (171 lb 15.3 oz)           Physical Exam    Constitutional:  Well-developed, well-nourished. No acute distress  HENT:  Normocephalic, atraumatic.  Eyes:  EOMI. Conjunctiva normal without discharge.   Neck: Normal range of motion.No stridor. No meningismus.   Respiratory:  No respiratory distress, retractions, or conversational dyspnea. Cardiovascular:  Normal heart rate. No pitting lower extremity edema.   Musculoskeletal:  No gross deformity or limited range of motion of all major joints.   Integument:  Warm and dry. No rash.  Neurologic:  Normal motor function. No focal deficits noted. Alert and Interactive.  Psychiatric:  Affect normal. Mood normal.         LABS  Pertinent labs reviewed. (See chart for details)   Labs Reviewed   URINALYSIS, REFLEX TO URINE CULTURE - Abnormal       Result Value    Color, UA Straw      Appearance, UA Slightly Cloudy (*)     Specific Gravity, UA 1.025      pH, UA  6.0      Protein, UA 30 (*)     Glucose, UA Negative      Ketones, UA Negative      Blood, UA Large (*)     Bilirubin, UA Negative      Urobilinogen, UA 0.2      Nitrites, UA Negative      Leukocyte Esterase, UA Negative     URINALYSIS, MICROSCOPIC - Abnormal    Bacteria, UA None Seen      RBC, UA 50-99 (*)     WBC, UA 3-5      Squamous Epithelial Cells, UA Moderate (*)          RADIOLOGY    Imaging Results    None         PROCEDURES    Procedures      EKG     Interpreted by ERP:         ED COURSE & MEDICAL DECISION MAKING    Pertinent & Imaging studies reviewed. (See chart for details and specific orders.)        Medications - No data to display              DISPOSITION  Patient discharged in stable condition at No discharge date for patient encounter.      DISCHARGE INSTRUCTIONS & MEDS       Medication List        START taking these medications      cephALEXin 500 MG capsule  Commonly known as: KEFLEX  Take 1 capsule (500 mg total) by mouth 4 (four) times daily. for 5 days     oxybutynin 5 MG Tab  Commonly known as: DITROPAN  Take 1 tablet (5 mg total) by mouth every 8 (eight) hours as needed (bladder spasm).            ASK your doctor about these medications      amLODIPine 5 MG tablet  Commonly known as: NORVASC     aspirin 81 MG EC tablet  Commonly known as: ECOTRIN  Take 1 tablet (81 mg total) by mouth 2 (two) times daily.     celecoxib 200 MG capsule  Commonly known as: CeleBREX     DULoxetine 30 MG capsule  Commonly known as: CYMBALTA     ergocalciferol 50,000 unit Cap  Commonly known as: ERGOCALCIFEROL     ezetimibe 10 mg tablet  Commonly known as: ZETIA     gabapentin 300 MG capsule  Commonly known as: NEURONTIN     hydroCHLOROthiazide 25 MG tablet  Commonly known as: HYDRODIURIL     MAPAP ARTHRITIS PAIN 650 mg Tbsr  Generic drug: acetaminophen  Take 1 tablet (650 mg total) by mouth every 8 (eight) hours as needed.     metroNIDAZOLE 0.75 % (37.5mg/5 gram) vaginal gel  Commonly known as: METROGEL  Place 1  applicator vaginally every evening.     NexIUM 40 MG capsule  Generic drug: esomeprazole     oxyCODONE 5 MG immediate release tablet  Commonly known as: ROXICODONE  Take 1-2 tabs by mouth every 4-6 hours as needed for pain     rOPINIRole 4 MG tablet  Commonly known as: REQUIP     STOOL SOFTENER 100 mg capsule  Generic drug: docusate sodium  Take 1 capsule (100 mg total) by mouth 2 (two) times daily as needed for Constipation.     traMADoL 50 mg tablet  Commonly known as: ULTRAM               Where to Get Your Medications        These medications were sent to [facility] [address] [place] [address] [address] [facility]  [address] [facility] [address]      Phone: [phone]   cephALEXin 500 MG capsule  oxybutynin 5 MG Tab           New Prescriptions    CEPHALEXIN (KEFLEX) 500 MG CAPSULE    Take 1 capsule (500 mg total) by mouth 4 (four) times daily. for 5 days    OXYBUTYNIN (DITROPAN) 5 MG TAB    Take 1 tablet (5 mg total) by mouth every 8 (eight) hours as needed (bladder spasm).           FINAL IMPRESSION    1. Hemorrhagic cystitis              Blood Pressure Follow-Up Advised  Patient advised to follow up with PCP within 3-5 days for blood pressure re-check if blood pressure is equal to or greater than 120/80.         Critical care time spent with this patient (not including separately billable items) was  0 minutes.     DISCLAIMER: This note was prepared with Dragon NaturallySpeaking voice recognition transcription software. Garbled syntax, mangled pronouns, and other bizarre constructions may be attributed to that software system.      Axel Coppola MD  03/01/2025  12:52 PM           Axel Coppola MD  03/01/25 [id number]

## 2025-03-13 DIAGNOSIS — N95.0 POST-MENOPAUSAL BLEEDING: Primary | ICD-10-CM

## 2025-03-14 ENCOUNTER — HOSPITAL ENCOUNTER (OUTPATIENT)
Dept: RADIOLOGY | Facility: HOSPITAL | Age: 70
Discharge: HOME | End: 2025-03-14
Attending: NURSE PRACTITIONER
Payer: MEDICARE

## 2025-03-14 DIAGNOSIS — N95.0 POST-MENOPAUSAL BLEEDING: ICD-10-CM

## 2025-03-14 PROCEDURE — 76856 US EXAM PELVIC COMPLETE: CPT | Mod: TC

## 2025-03-24 ENCOUNTER — TELEPHONE (OUTPATIENT)
Dept: OBSTETRICS AND GYNECOLOGY | Facility: HOSPITAL | Age: 70
End: 2025-03-24
Payer: MEDICARE

## 2025-03-24 DIAGNOSIS — C53.8 MALIGNANT NEOPLASM OF OVERLAPPING SITES OF CERVIX: Primary | ICD-10-CM

## 2025-03-24 NOTE — TELEPHONE ENCOUNTER
Spoke to mrs quijano today about her EMB  She understands this is likely cervical cancer and I want to get her GYN ONCO consult  She has no complaints today she states the spotting has stopped  Will refer her to dr osorio  Time taken for Q&A

## 2025-04-08 DIAGNOSIS — C53.9 MALIGNANT NEOPLASM OF CERVIX, UNSPECIFIED SITE: Primary | ICD-10-CM

## 2025-04-15 ENCOUNTER — TELEPHONE (OUTPATIENT)
Dept: HEMATOLOGY/ONCOLOGY | Facility: CLINIC | Age: 70
End: 2025-04-15
Payer: MEDICARE

## 2025-04-15 NOTE — TELEPHONE ENCOUNTER
Spoke with patient regarding new patient appointment 4/16/25 with Dr. Adair. Patient denied concerns or questions at this time. Confirmed appointment date, time and location with patient.

## 2025-04-16 ENCOUNTER — OFFICE VISIT (OUTPATIENT)
Dept: HEMATOLOGY/ONCOLOGY | Facility: CLINIC | Age: 70
End: 2025-04-16
Payer: MEDICARE

## 2025-04-16 ENCOUNTER — CLINICAL SUPPORT (OUTPATIENT)
Dept: HEMATOLOGY/ONCOLOGY | Facility: CLINIC | Age: 70
End: 2025-04-16
Payer: MEDICARE

## 2025-04-16 VITALS
TEMPERATURE: 98 F | OXYGEN SATURATION: 97 % | HEART RATE: 76 BPM | WEIGHT: 171.31 LBS | HEIGHT: 62 IN | SYSTOLIC BLOOD PRESSURE: 136 MMHG | BODY MASS INDEX: 31.52 KG/M2 | RESPIRATION RATE: 18 BRPM | DIASTOLIC BLOOD PRESSURE: 73 MMHG

## 2025-04-16 DIAGNOSIS — R97.8 OTHER ABNORMAL TUMOR MARKERS: ICD-10-CM

## 2025-04-16 DIAGNOSIS — C53.9 MALIGNANT NEOPLASM OF CERVIX, UNSPECIFIED SITE: ICD-10-CM

## 2025-04-16 DIAGNOSIS — C53.9 MALIGNANT NEOPLASM OF CERVIX, UNSPECIFIED SITE: Primary | ICD-10-CM

## 2025-04-16 LAB
ALBUMIN SERPL-MCNC: 3.7 G/DL (ref 3.4–4.8)
ALBUMIN/GLOB SERPL: 0.9 RATIO (ref 1.1–2)
ALP SERPL-CCNC: 79 UNIT/L (ref 40–150)
ALT SERPL-CCNC: 14 UNIT/L (ref 0–55)
ANION GAP SERPL CALC-SCNC: 7 MEQ/L
AST SERPL-CCNC: 20 UNIT/L (ref 11–45)
BASOPHILS # BLD AUTO: 0.02 X10(3)/MCL
BASOPHILS NFR BLD AUTO: 0.3 %
BILIRUB SERPL-MCNC: 0.5 MG/DL
BUN SERPL-MCNC: 23.9 MG/DL (ref 9.8–20.1)
CALCIUM SERPL-MCNC: 9.4 MG/DL (ref 8.4–10.2)
CEA SERPL-MCNC: 2.51 NG/ML (ref 0–3)
CHLORIDE SERPL-SCNC: 104 MMOL/L (ref 98–107)
CO2 SERPL-SCNC: 29 MMOL/L (ref 23–31)
CREAT SERPL-MCNC: 1.01 MG/DL (ref 0.55–1.02)
CREAT/UREA NIT SERPL: 24
EOSINOPHIL # BLD AUTO: 0.13 X10(3)/MCL (ref 0–0.9)
EOSINOPHIL NFR BLD AUTO: 2.2 %
ERYTHROCYTE [DISTWIDTH] IN BLOOD BY AUTOMATED COUNT: 13 % (ref 11.5–17)
GFR SERPLBLD CREATININE-BSD FMLA CKD-EPI: 60 ML/MIN/1.73/M2
GLOBULIN SER-MCNC: 4 GM/DL (ref 2.4–3.5)
GLUCOSE SERPL-MCNC: 106 MG/DL (ref 82–115)
HCT VFR BLD AUTO: 41.4 % (ref 37–47)
HGB BLD-MCNC: 13.5 G/DL (ref 12–16)
IMM GRANULOCYTES # BLD AUTO: 0.01 X10(3)/MCL (ref 0–0.04)
IMM GRANULOCYTES NFR BLD AUTO: 0.2 %
LYMPHOCYTES # BLD AUTO: 1.87 X10(3)/MCL (ref 0.6–4.6)
LYMPHOCYTES NFR BLD AUTO: 31.2 %
MCH RBC QN AUTO: 27.8 PG (ref 27–31)
MCHC RBC AUTO-ENTMCNC: 32.6 G/DL (ref 33–36)
MCV RBC AUTO: 85.4 FL (ref 80–94)
MONOCYTES # BLD AUTO: 0.58 X10(3)/MCL (ref 0.1–1.3)
MONOCYTES NFR BLD AUTO: 9.7 %
NEUTROPHILS # BLD AUTO: 3.38 X10(3)/MCL (ref 2.1–9.2)
NEUTROPHILS NFR BLD AUTO: 56.4 %
PLATELET # BLD AUTO: 218 X10(3)/MCL (ref 130–400)
PMV BLD AUTO: 9.5 FL (ref 7.4–10.4)
POTASSIUM SERPL-SCNC: 4.8 MMOL/L (ref 3.5–5.1)
PROT SERPL-MCNC: 7.7 GM/DL (ref 5.8–7.6)
RBC # BLD AUTO: 4.85 X10(6)/MCL (ref 4.2–5.4)
SODIUM SERPL-SCNC: 140 MMOL/L (ref 136–145)
WBC # BLD AUTO: 5.99 X10(3)/MCL (ref 4.5–11.5)

## 2025-04-16 PROCEDURE — 3288F FALL RISK ASSESSMENT DOCD: CPT | Mod: CPTII,S$GLB,COE, | Performed by: INTERNAL MEDICINE

## 2025-04-16 PROCEDURE — 82378 CARCINOEMBRYONIC ANTIGEN: CPT | Performed by: INTERNAL MEDICINE

## 2025-04-16 PROCEDURE — 1101F PT FALLS ASSESS-DOCD LE1/YR: CPT | Mod: CPTII,S$GLB,COE, | Performed by: INTERNAL MEDICINE

## 2025-04-16 PROCEDURE — 1126F AMNT PAIN NOTED NONE PRSNT: CPT | Mod: CPTII,S$GLB,COE, | Performed by: INTERNAL MEDICINE

## 2025-04-16 PROCEDURE — G2211 COMPLEX E/M VISIT ADD ON: HCPCS | Mod: S$GLB,COE,, | Performed by: INTERNAL MEDICINE

## 2025-04-16 PROCEDURE — 99205 OFFICE O/P NEW HI 60 MIN: CPT | Mod: S$GLB,COE,, | Performed by: INTERNAL MEDICINE

## 2025-04-16 PROCEDURE — 3008F BODY MASS INDEX DOCD: CPT | Mod: CPTII,S$GLB,COE, | Performed by: INTERNAL MEDICINE

## 2025-04-16 PROCEDURE — 99999 PR PBB SHADOW E&M-EST. PATIENT-LVL V: CPT | Mod: PBBFAC,COE,, | Performed by: INTERNAL MEDICINE

## 2025-04-16 PROCEDURE — 3078F DIAST BP <80 MM HG: CPT | Mod: CPTII,S$GLB,COE, | Performed by: INTERNAL MEDICINE

## 2025-04-16 PROCEDURE — 3075F SYST BP GE 130 - 139MM HG: CPT | Mod: CPTII,S$GLB,COE, | Performed by: INTERNAL MEDICINE

## 2025-04-16 PROCEDURE — 3044F HG A1C LEVEL LT 7.0%: CPT | Mod: CPTII,S$GLB,COE, | Performed by: INTERNAL MEDICINE

## 2025-04-16 PROCEDURE — 1159F MED LIST DOCD IN RCRD: CPT | Mod: CPTII,S$GLB,COE, | Performed by: INTERNAL MEDICINE

## 2025-04-16 PROCEDURE — 36415 COLL VENOUS BLD VENIPUNCTURE: CPT | Performed by: INTERNAL MEDICINE

## 2025-04-16 PROCEDURE — 85025 COMPLETE CBC W/AUTO DIFF WBC: CPT | Performed by: INTERNAL MEDICINE

## 2025-04-16 PROCEDURE — 80053 COMPREHEN METABOLIC PANEL: CPT | Performed by: INTERNAL MEDICINE

## 2025-04-16 PROCEDURE — 1160F RVW MEDS BY RX/DR IN RCRD: CPT | Mod: CPTII,S$GLB,COE, | Performed by: INTERNAL MEDICINE

## 2025-04-16 RX ORDER — PRAMIPEXOLE DIHYDROCHLORIDE 0.5 MG/1
0.5 TABLET ORAL
COMMUNITY

## 2025-04-16 NOTE — NURSING
"Oncology Navigation   Intake  Date of Diagnosis: 03/19/25  Cancer Type: Gynecologic     Treatment     Surgical Oncologist: Dr. Jeremy Browne  Consult Date: 04/04/25    Medical Oncologist: Dr. Nicole Adair  Consult Date: 04/16/25  Chemotherapy: Planned       Procedures: Port / PICC  Port / PICC Schedule Date: 04/21/25             Support Systems: Spouse/significant other; Family members  Barriers of Care: Barriers to Care "Assessment completed-no barriers noted"     Acuity      Follow Up  4/23/25     Met with patient today following new patient appointment with Dr. Adair. Patient is accompanied by her . Introduced self and role of navigation. Assessed for barriers to care. Patient denies depression. She states that she was a little anxious for her appointment today but is doing ok. She denies transportation or financial concerns. She denies need for home health or medical equipment. She understands and agrees with plan discussed with Dr. Adair today. She has no further questions or concerns today. She is aware of how to reach navigation should she need to.   "

## 2025-04-16 NOTE — PROGRESS NOTES
HEMATOLOGY/ONCOLOGY OFFICE CLINIC VISIT    Visit Information:  Visit type:    new patient  Accompanied by:     Source of history:  Self  History limitation:  None      Initial Evaluation: 2025  Referring Provider: Dr Jeremy Browne  Other providers:  Code status:    Diagnosis: Cervical Cancer    Present treatment:    Treatment/Oncology history:    Goal of care:     Imaging:    Pathology:      CLINICAL HISTORY:       Patient: Lora Goins is a 69 y.o. female. with history of      kindly referred for         Chief Complaint: New Patient (NP referred by Dr. Jeremy Browne for Cervical Ca.)      Interval History:      HPI  Past Medical History:   Diagnosis Date    Arthritis     Charcot Kayla Tooth muscular atrophy     Fibromyalgia     GERD (gastroesophageal reflux disease)     Hyperlipidemia     Hypertension     Hypertension     Restless leg syndrome       Past Surgical History:   Procedure Laterality Date     SECTION      2     SECTION      FOOT SURGERY      JOINT REPLACEMENT      KNEE ARTHROPLASTY Right 2020    Procedure: ARTHROPLASTY, KNEE - [employer] [facility];  Surgeon: Chapito Elkins MD;  Location: [facility];  Service: Orthopedics;  Laterality: Right;    LITHOTRIPSY      around      REMOVAL OF PLANTAR WART USING LASER      SINUS SURGERY          TONSILLECTOMY      TOTAL KNEE ARTHROPLASTY Left 2019    Procedure: ARTHROPLASTY, KNEE, TOTAL-[employer] [facility];  Surgeon: Chapito Elkins MD;  Location: [address];  Service: Orthopedics;  Laterality: Left;     Family History   Problem Relation Name Age of Onset    Cancer Mother          brain tumor , hip, blood stream, lungs    Cancer Father      Cancer Sister          lung tumor          Review of patient's allergies indicates:   Allergen Reactions    Ezetimibe-simvastatin      Cramps  Can take Ezetimibe    Lovastatin      cramps    Pitavastatin      cramps    Pravastatin      Muscle cramps      Medications Ordered Prior to  Encounter[1]   Review of Systems   Constitutional:  Negative for appetite change and unexpected weight change.   HENT:  Negative for mouth sores.    Eyes:  Negative for visual disturbance.   Respiratory:  Negative for cough and shortness of breath.    Cardiovascular:  Negative for chest pain.   Gastrointestinal:  Positive for abdominal pain. Negative for diarrhea.   Genitourinary:  Negative for frequency.   Musculoskeletal:  Positive for back pain.   Integumentary:  Negative for rash.   Neurological:  Negative for headaches.   Hematological:  Negative for adenopathy.   Psychiatric/Behavioral:  The patient is not nervous/anxious.               Vitals:    04/16/25 1343   Weight: 77.7 kg (171 lb 4.8 oz)      Wt Readings from Last 6 Encounters:   04/16/25 77.7 kg (171 lb 4.8 oz)   03/19/25 74.4 kg (164 lb)   03/01/25 78 kg (171 lb 15.3 oz)   06/26/24 78.9 kg (174 lb)   07/28/20 74.8 kg (165 lb)   07/27/20 74.8 kg (164 lb 14.5 oz)     Body mass index is 31.13 kg/m².  Body surface area is 1.85 meters squared.  Physical Exam  ECOG SCORE             Laboratory:  CBC with Differential:  Lab Results   Component Value Date    WBC 4.94 01/29/2025    RBC 4.80 01/29/2025    HGB 13.7 01/29/2025    HCT 42.1 01/29/2025    MCV 87.7 01/29/2025    MCH 28.5 01/29/2025    MCHC 32.5 (L) 01/29/2025    RDW 12.9 01/29/2025     01/29/2025    MPV 9.6 01/29/2025    GRAN 3.2 07/27/2020    GRAN 52.6 07/27/2020    LYMPH 2.3 07/27/2020    LYMPH 37.3 07/27/2020    MONO 0.4 07/27/2020    MONO 6.5 07/27/2020    EOS 0.2 11/23/2022    BASO 0.0 11/23/2022    EOSINOPHIL 4 11/23/2022    BASOPHIL 0.8 07/27/2020        CMP:  Sodium   Date Value Ref Range Status   01/29/2025 141 136 - 145 mmol/L Final   07/27/2020 142 136 - 145 mmol/L Final     Potassium   Date Value Ref Range Status   01/29/2025 4.3 3.5 - 5.1 mmol/L Final   07/27/2020 3.9 3.5 - 5.1 mmol/L Final     Chloride   Date Value Ref Range Status   01/29/2025 106 98 - 107 mmol/L Final    07/27/2020 103 95 - 110 mmol/L Final     CO2   Date Value Ref Range Status   01/29/2025 29 23 - 31 mmol/L Final   07/27/2020 30 (H) 23 - 29 mmol/L Final     Glucose   Date Value Ref Range Status   07/27/2020 161 (H) 70 - 110 mg/dL Final     BUN   Date Value Ref Range Status   07/27/2020 19 8 - 23 mg/dL Final     Blood Urea Nitrogen   Date Value Ref Range Status   01/29/2025 19.1 9.8 - 20.1 mg/dL Final     Creatinine   Date Value Ref Range Status   01/29/2025 0.99 0.55 - 1.02 mg/dL Final   07/27/2020 1.0 0.5 - 1.4 mg/dL Final     Calcium   Date Value Ref Range Status   01/29/2025 9.3 8.4 - 10.2 mg/dL Final   07/27/2020 9.4 8.7 - 10.5 mg/dL Final     Albumin   Date Value Ref Range Status   01/29/2025 3.4 3.4 - 4.8 g/dL Final     Bilirubin Total   Date Value Ref Range Status   01/29/2025 1.0 <=1.5 mg/dL Final     ALP   Date Value Ref Range Status   01/29/2025 73 40 - 150 unit/L Final     AST   Date Value Ref Range Status   01/29/2025 21 5 - 34 unit/L Final     ALT   Date Value Ref Range Status   01/29/2025 15 0 - 55 unit/L Final     Anion Gap   Date Value Ref Range Status   07/27/2020 9 8 - 16 mmol/L Final     eGFR if    Date Value Ref Range Status   07/27/2020 >60.0 >60 mL/min/1.73 m^2 Final     eGFR if non    Date Value Ref Range Status   07/27/2020 59.7 (A) >60 mL/min/1.73 m^2 Final     Comment:     Calculation used to obtain the estimated glomerular filtration  rate (eGFR) is the CKD-EPI equation.                Assessment:       1. Malignant neoplasm of cervix, unspecified site              Plan:   Labs today  PICC Line   Chemo Class  RTC in 3 weeks TD/same day labs/infusion next day (Monday for TD)  Labs: CBC, CMP      The patient was seen, interviewed and examined. Pertinent lab and radiology studies were reviewed.   The patient was given ample opportunity to ask questions, and to the best of my abilities, all questions answered to satisfaction; patient demonstrated  understanding of what we discussed and agreeable to the plan. Pt instructed to call should develop concerning signs/symptoms or have further questions.   Visit today included increased complexity associated with the care of the episodic problem ****, addressing and managing the longitudinal care of the patient's ****.         Nicole Adair MD  Hematology/Oncology               [1]   Current Outpatient Medications on File Prior to Visit   Medication Sig Dispense Refill    amLODIPine (NORVASC) 5 MG tablet Take 5 mg by mouth once daily. Take in am of surgery      celecoxib (CELEBREX) 200 MG capsule Take 200 mg by mouth 2 (two) times daily. Hold for 1 week prior to surgery      DULoxetine (CYMBALTA) 30 MG capsule Take 30 mg by mouth once daily. Take in am of surgery      esomeprazole (NEXIUM) 40 MG capsule Take 40 mg by mouth once daily. Take in am of surgery      ezetimibe (ZETIA) 10 mg tablet Take 10 mg by mouth every evening. Takes at night      gabapentin (NEURONTIN) 300 MG capsule Take 300 mg by mouth 2 (two) times daily. Take if needed      hydroCHLOROthiazide (HYDRODIURIL) 25 MG tablet Take 25 mg by mouth once daily. Hold am of surgery      pramipexole (MIRAPEX) 0.5 MG tablet Take 0.5 mg by mouth.      rOPINIRole (REQUIP) 4 MG tablet Takes at night      traMADol (ULTRAM) 50 mg tablet 2 (two) times daily as needed. Pain    Take if needed      [DISCONTINUED] acetaminophen (TYLENOL) 650 MG TbSR Take 1 tablet (650 mg total) by mouth every 8 (eight) hours as needed. 120 tablet 0    [DISCONTINUED] aspirin (ECOTRIN) 81 MG EC tablet Take 1 tablet (81 mg total) by mouth 2 (two) times daily. 60 tablet 0    [DISCONTINUED] docusate sodium (COLACE) 100 MG capsule Take 1 capsule (100 mg total) by mouth 2 (two) times daily as needed for Constipation. 60 capsule 0    [DISCONTINUED] ergocalciferol (ERGOCALCIFEROL) 50,000 unit Cap Take 50,000 Units by mouth every 7 days.      [DISCONTINUED] metroNIDAZOLE (METROGEL) 0.75 %  (37.5mg/5 gram) vaginal gel Place 1 applicator vaginally every evening. 70 g 0    [DISCONTINUED] oxybutynin (DITROPAN) 5 MG Tab Take 1 tablet (5 mg total) by mouth every 8 (eight) hours as needed (bladder spasm). 20 tablet 0    [DISCONTINUED] oxyCODONE (ROXICODONE) 5 MG immediate release tablet Take 1-2 tabs by mouth every 4-6 hours as needed for pain 50 tablet 0     No current facility-administered medications on file prior to visit.

## 2025-04-16 NOTE — PROGRESS NOTES
HEMATOLOGY/ONCOLOGY OFFICE CLINIC VISIT    Visit Information:  Visit type:    New patient Oncology  Accompanied by:     Source of history:  Self  History limitation:  None      Initial Evaluation:  04/16/2025  Referring Provider:  Dr. Jeremy Browne  Other providers: Dr. 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 appears to cause obstruction with distension of the endometrial cavity with fluid.    Pathology:      CLINICAL HISTORY:       Patient: Lora Goins is a 69 y.o. female. with history of      kindly referred for   cervical cancer.    Patient initially presented with watery vaginal discharge in March of this year that eventually progress to blood tinged discharge.  She did not have any other symptoms associated with it.  Patient did have a pelvic ultrasound on 03/14/2025 that showed and hypoechoic mass in the cervix measuring 5.3 x 4 x 4 cm.  There was distention of the endometrial cavity with fluid.  There was no free pelvic fluid.  Uterus was 11 x 5 x 5 cm.      She underwent cervical biopsy on 03/19/2025 that showed moderately differentiated squamous cell carcinoma arising in the background of extensive severe dysplasia with areas of necrosis.  Of note she has history of cervical dysplasia for which she received cryotherapy over 20 years ago.     She was seen by Dr. Jeremy Browne on 04/04/2025 because of the size of the tumor she is not a surgical candidate.    She is here today with her .  She is doing relatively well and voices no concerns.  No family  history of breast, ovarian cancer    Chief Complaint: New Patient (NP referred by Dr. Jeremy Browne for Cervical Ca.)      Interval History:        Past Medical History:   Diagnosis Date    Arthritis     Charcot Kayla Tooth muscular atrophy     Fibromyalgia     GERD (gastroesophageal reflux disease)     Hyperlipidemia     Hypertension     Hypertension     Restless leg syndrome       Past Surgical History:   Procedure Laterality Date     SECTION      2     SECTION      FOOT SURGERY      JOINT REPLACEMENT      KNEE ARTHROPLASTY Right 2020    Procedure: ARTHROPLASTY, KNEE - [employer] [facility];  Surgeon: Chapiot Elkins MD;  Location: [facility];  Service: Orthopedics;  Laterality: Right;    LITHOTRIPSY      around      REMOVAL OF PLANTAR WART USING LASER      SINUS SURGERY      2012    TONSILLECTOMY      TOTAL KNEE ARTHROPLASTY Left 2019    Procedure: ARTHROPLASTY, KNEE, TOTAL-[employer] [facility];  Surgeon: Chapito Elkins MD;  Location: [address];  Service: Orthopedics;  Laterality: Left;     Family History   Problem Relation Name Age of Onset    Cancer Mother          brain tumor , hip, blood stream, lungs    Cancer Father      Cancer Sister          lung tumor          Review of patient's allergies indicates:   Allergen Reactions    Ezetimibe-simvastatin      Cramps  Can take Ezetimibe    Lovastatin      cramps    Pitavastatin      cramps    Pravastatin      Muscle cramps      Medications Ordered Prior to Encounter[1]   Review of Systems   Constitutional:  Negative for activity change, appetite change, chills, fatigue, fever, night sweats and unexpected weight change.   HENT:  Negative for mouth dryness, mouth sores, nosebleeds, sore throat and trouble swallowing.    Eyes: Negative.  Negative for visual disturbance.   Respiratory:  Negative for cough and shortness of breath.    Cardiovascular:  Negative for chest pain, palpitations and leg swelling.   Gastrointestinal:  Positive for  "abdominal pain. Negative for abdominal distention, blood in stool, change in bowel habit, constipation, diarrhea, nausea and vomiting.   Endocrine: Negative.    Genitourinary:  Negative for dysuria, frequency, hematuria and urgency.   Musculoskeletal:  Positive for back pain. Negative for arthralgias, myalgias and neck pain.   Integumentary:  Negative for rash.   Neurological:  Negative for dizziness, tremors, syncope, speech difficulty, weakness, light-headedness, numbness, headaches and memory loss.   Hematological:  Negative for adenopathy. Does not bruise/bleed easily.   Psychiatric/Behavioral:  Negative for confusion and suicidal ideas. The patient is not nervous/anxious.               Vitals:    04/16/25 1343   BP: 136/73   BP Location: Right arm   Patient Position: Sitting   Pulse: 76   Resp: 18   Temp: 98 °F (36.7 °C)   TempSrc: Oral   SpO2: 97%   Weight: 77.7 kg (171 lb 4.8 oz)   Height: 5' 2.21" (1.58 m)      Wt Readings from Last 6 Encounters:   04/16/25 77.7 kg (171 lb 4.8 oz)   03/19/25 74.4 kg (164 lb)   03/01/25 78 kg (171 lb 15.3 oz)   06/26/24 78.9 kg (174 lb)   07/28/20 74.8 kg (165 lb)   07/27/20 74.8 kg (164 lb 14.5 oz)     Body mass index is 31.12 kg/m².  Body surface area is 1.85 meters squared.  Physical Exam  Vitals and nursing note reviewed.   Constitutional:       General: She is not in acute distress.     Appearance: Normal appearance. She is well-developed.   HENT:      Head: Normocephalic and atraumatic.      Mouth/Throat:      Mouth: Mucous membranes are moist.   Eyes:      General: No scleral icterus.     Extraocular Movements: Extraocular movements intact.      Conjunctiva/sclera: Conjunctivae normal.      Pupils: Pupils are equal, round, and reactive to light.   Neck:      Vascular: No JVD.   Cardiovascular:      Rate and Rhythm: Normal rate and regular rhythm.      Heart sounds: No murmur heard.  Pulmonary:      Effort: Pulmonary effort is normal.      Breath sounds: Normal breath " sounds. No wheezing or rhonchi.   Abdominal:      General: Bowel sounds are normal. There is no distension.      Palpations: Abdomen is soft. There is no mass.      Tenderness: There is no abdominal tenderness.   Musculoskeletal:         General: No swelling or deformity.      Cervical back: Neck supple.   Lymphadenopathy:      Head:      Right side of head: No submental or submandibular adenopathy.      Left side of head: No submental or submandibular adenopathy.      Cervical: No cervical adenopathy.      Lower Body: No right inguinal adenopathy. No left inguinal adenopathy.   Skin:     General: Skin is warm.      Coloration: Skin is not jaundiced.      Findings: No lesion or rash.      Nails: There is no clubbing.   Neurological:      General: No focal deficit present.      Mental Status: She is alert and oriented to person, place, and time.      Cranial Nerves: Cranial nerves 2-12 are intact.   Psychiatric:         Attention and Perception: Attention normal.         Behavior: Behavior is cooperative.         Judgment: Judgment normal.       ECOG SCORE    0 - Fully active-able to carry on all pre-disease performance without restriction         Laboratory:  CBC with Differential:  Lab Results   Component Value Date    WBC 5.99 04/16/2025    RBC 4.85 04/16/2025    HGB 13.5 04/16/2025    HCT 41.4 04/16/2025    MCV 85.4 04/16/2025    MCH 27.8 04/16/2025    MCHC 32.6 (L) 04/16/2025    RDW 13.0 04/16/2025     04/16/2025    MPV 9.5 04/16/2025    GRAN 3.2 07/27/2020    GRAN 52.6 07/27/2020    LYMPH 2.3 07/27/2020    LYMPH 37.3 07/27/2020    MONO 0.4 07/27/2020    MONO 6.5 07/27/2020    EOS 0.2 11/23/2022    BASO 0.0 11/23/2022    EOSINOPHIL 4 11/23/2022    BASOPHIL 0.8 07/27/2020        CMP:  Sodium   Date Value Ref Range Status   04/16/2025 140 136 - 145 mmol/L Final   07/27/2020 142 136 - 145 mmol/L Final     Potassium   Date Value Ref Range Status   04/16/2025 4.8 3.5 - 5.1 mmol/L Final   07/27/2020 3.9 3.5 -  5.1 mmol/L Final     Chloride   Date Value Ref Range Status   04/16/2025 104 98 - 107 mmol/L Final   07/27/2020 103 95 - 110 mmol/L Final     CO2   Date Value Ref Range Status   04/16/2025 29 23 - 31 mmol/L Final   07/27/2020 30 (H) 23 - 29 mmol/L Final     Glucose   Date Value Ref Range Status   07/27/2020 161 (H) 70 - 110 mg/dL Final     BUN   Date Value Ref Range Status   07/27/2020 19 8 - 23 mg/dL Final     Blood Urea Nitrogen   Date Value Ref Range Status   04/16/2025 23.9 (H) 9.8 - 20.1 mg/dL Final     Creatinine   Date Value Ref Range Status   04/16/2025 1.01 0.55 - 1.02 mg/dL Final   07/27/2020 1.0 0.5 - 1.4 mg/dL Final     Calcium   Date Value Ref Range Status   04/16/2025 9.4 8.4 - 10.2 mg/dL Final   07/27/2020 9.4 8.7 - 10.5 mg/dL Final     Albumin   Date Value Ref Range Status   04/16/2025 3.7 3.4 - 4.8 g/dL Final     Bilirubin Total   Date Value Ref Range Status   04/16/2025 0.5 <=1.5 mg/dL Final     ALP   Date Value Ref Range Status   04/16/2025 79 40 - 150 unit/L Final     AST   Date Value Ref Range Status   04/16/2025 20 11 - 45 unit/L Final     ALT   Date Value Ref Range Status   04/16/2025 14 0 - 55 unit/L Final     Anion Gap   Date Value Ref Range Status   07/27/2020 9 8 - 16 mmol/L Final     eGFR if    Date Value Ref Range Status   07/27/2020 >60.0 >60 mL/min/1.73 m^2 Final     eGFR if non    Date Value Ref Range Status   07/27/2020 59.7 (A) >60 mL/min/1.73 m^2 Final     Comment:     Calculation used to obtain the estimated glomerular filtration  rate (eGFR) is the CKD-EPI equation.                Assessment:       1. Malignant neoplasm of cervix, unspecified site    2. Other abnormal tumor markers      Discussed with the patient her diagnosis, prognosis and treatment recommendations according to the NCCN guidelines.  Discussed briefly chemotherapy, risks versus benefits as well as toxicities associated with it.  Patient with good knowledge of what is going on as  this was discussed by Dr. Browne during her visit.    Educated the patient on the risks versus benefits as well as toxicities associated with treatment.  Verbally consented the patient to the treatment plan and the patient was educated on the planned duration of the treatment and schedule of the treatment administration.  All questions were answered.        Plan:         Keep appointment with radiation oncology  We will start cisplatin same week of starting radiation therapy  Labs today  PICC Line per patient request  Chemo Class  RTC in 3 weeks TD/same day labs/infusion next day (Monday for TD)  Labs: CBC, CMP    The patient was seen, interviewed and examined. Pertinent lab and radiology studies were reviewed.   The patient was given ample opportunity to ask questions, and to the best of my abilities, all questions answered to satisfaction; patient demonstrated understanding of what we discussed and agreeable to the plan. Pt instructed to call should develop concerning signs/symptoms or have further questions.   Visit today included increased complexity associated with the care of the episodic problem treatment clearance, addressing and managing the longitudinal care of the patient's cervical cancer.           Nicole Adair MD  Hematology/Oncology      Total time spent in counseling and discussion about further management options including relevant lab work, treatment,  prognosis, medications and intended side effects was more than 60 minutes. More than 50% of the time was spent on counseling and coordination of care.  I spent a total of 60 minutes on the day of the visit.This includes face to face time and non-face to face time preparing to see the patient (eg, review of tests), Obtaining and/or reviewing separately obtained history, Documenting clinical information in the electronic or other health record, Independently interpreting resultsand communicating results to the patient/family/caregiver, or Care  coordination.            [1]   Current Outpatient Medications on File Prior to Visit   Medication Sig Dispense Refill    amLODIPine (NORVASC) 5 MG tablet Take 5 mg by mouth once daily. Take in am of surgery      celecoxib (CELEBREX) 200 MG capsule Take 200 mg by mouth 2 (two) times daily. Hold for 1 week prior to surgery      DULoxetine (CYMBALTA) 30 MG capsule Take 30 mg by mouth once daily. Take in am of surgery      esomeprazole (NEXIUM) 40 MG capsule Take 40 mg by mouth once daily. Take in am of surgery      ezetimibe (ZETIA) 10 mg tablet Take 10 mg by mouth every evening. Takes at night      gabapentin (NEURONTIN) 300 MG capsule Take 300 mg by mouth 2 (two) times daily. Take if needed      hydroCHLOROthiazide (HYDRODIURIL) 25 MG tablet Take 25 mg by mouth once daily. Hold am of surgery      pramipexole (MIRAPEX) 0.5 MG tablet Take 0.5 mg by mouth.      rOPINIRole (REQUIP) 4 MG tablet Takes at night      traMADol (ULTRAM) 50 mg tablet 2 (two) times daily as needed. Pain    Take if needed       No current facility-administered medications on file prior to visit.

## 2025-04-17 PROBLEM — C53.9 MALIGNANT NEOPLASM OF CERVIX: Status: ACTIVE | Noted: 2025-04-17

## 2025-04-17 RX ORDER — EPINEPHRINE 0.3 MG/.3ML
0.3 INJECTION SUBCUTANEOUS ONCE AS NEEDED
OUTPATIENT
Start: 2025-04-29

## 2025-04-17 RX ORDER — DIPHENHYDRAMINE HYDROCHLORIDE 50 MG/ML
50 INJECTION, SOLUTION INTRAMUSCULAR; INTRAVENOUS ONCE AS NEEDED
OUTPATIENT
Start: 2025-04-29

## 2025-04-17 RX ORDER — PROCHLORPERAZINE EDISYLATE 5 MG/ML
5 INJECTION INTRAMUSCULAR; INTRAVENOUS ONCE AS NEEDED
OUTPATIENT
Start: 2025-04-29

## 2025-04-17 RX ORDER — SODIUM CHLORIDE 0.9 % (FLUSH) 0.9 %
10 SYRINGE (ML) INJECTION
OUTPATIENT
Start: 2025-04-29

## 2025-04-17 RX ORDER — HEPARIN 100 UNIT/ML
500 SYRINGE INTRAVENOUS
OUTPATIENT
Start: 2025-04-29

## 2025-04-21 ENCOUNTER — APPOINTMENT (OUTPATIENT)
Dept: RADIATION THERAPY | Facility: HOSPITAL | Age: 70
End: 2025-04-21
Attending: RADIOLOGY
Payer: MEDICARE

## 2025-04-22 ENCOUNTER — HOSPITAL ENCOUNTER (EMERGENCY)
Facility: HOSPITAL | Age: 70
Discharge: HOME | End: 2025-04-22
Payer: MEDICARE

## 2025-04-22 ENCOUNTER — HOSPITAL ENCOUNTER (OUTPATIENT)
Facility: HOSPITAL | Age: 70
Discharge: HOME | End: 2025-04-22
Attending: INTERNAL MEDICINE | Admitting: INTERNAL MEDICINE
Payer: MEDICARE

## 2025-04-22 VITALS
TEMPERATURE: 98 F | OXYGEN SATURATION: 98 % | HEIGHT: 62 IN | HEART RATE: 71 BPM | DIASTOLIC BLOOD PRESSURE: 81 MMHG | SYSTOLIC BLOOD PRESSURE: 148 MMHG | WEIGHT: 170 LBS | RESPIRATION RATE: 20 BRPM | BODY MASS INDEX: 31.28 KG/M2

## 2025-04-22 VITALS
DIASTOLIC BLOOD PRESSURE: 73 MMHG | SYSTOLIC BLOOD PRESSURE: 127 MMHG | OXYGEN SATURATION: 96 % | RESPIRATION RATE: 20 BRPM | TEMPERATURE: 98 F | HEART RATE: 72 BPM

## 2025-04-22 DIAGNOSIS — M25.561 ACUTE PAIN OF BOTH KNEES: Primary | ICD-10-CM

## 2025-04-22 DIAGNOSIS — M25.561 BILATERAL KNEE PAIN: ICD-10-CM

## 2025-04-22 DIAGNOSIS — M25.562 ACUTE PAIN OF BOTH KNEES: Primary | ICD-10-CM

## 2025-04-22 DIAGNOSIS — C53.9 MALIGNANT NEOPLASM OF CERVIX, UNSPECIFIED SITE: ICD-10-CM

## 2025-04-22 DIAGNOSIS — M25.562 BILATERAL KNEE PAIN: ICD-10-CM

## 2025-04-22 PROCEDURE — 96372 THER/PROPH/DIAG INJ SC/IM: CPT | Performed by: NURSE PRACTITIONER

## 2025-04-22 PROCEDURE — 99285 EMERGENCY DEPT VISIT HI MDM: CPT | Mod: 25

## 2025-04-22 PROCEDURE — 63600175 PHARM REV CODE 636 W HCPCS: Mod: JZ,TB | Performed by: NURSE PRACTITIONER

## 2025-04-22 RX ORDER — ESOMEPRAZOLE MAGNESIUM 40 MG/1
1 CAPSULE, DELAYED RELEASE ORAL EVERY MORNING
COMMUNITY
Start: 2024-09-05

## 2025-04-22 RX ORDER — HYDROCHLOROTHIAZIDE 25 MG/1
25 TABLET ORAL
COMMUNITY
Start: 2024-11-13

## 2025-04-22 RX ORDER — KETOROLAC TROMETHAMINE 30 MG/ML
30 INJECTION, SOLUTION INTRAMUSCULAR; INTRAVENOUS
Status: COMPLETED | OUTPATIENT
Start: 2025-04-22 | End: 2025-04-22

## 2025-04-22 RX ORDER — TRAMADOL HYDROCHLORIDE 50 MG/1
100 TABLET ORAL
COMMUNITY

## 2025-04-22 RX ORDER — EZETIMIBE 10 MG/1
10 TABLET ORAL
COMMUNITY
Start: 2024-11-03

## 2025-04-22 RX ORDER — DULOXETIN HYDROCHLORIDE 30 MG/1
1 CAPSULE, DELAYED RELEASE ORAL DAILY
COMMUNITY
Start: 2024-11-03

## 2025-04-22 RX ORDER — AMLODIPINE BESYLATE 5 MG/1
5 TABLET ORAL
COMMUNITY
Start: 2024-09-05

## 2025-04-22 RX ORDER — CELECOXIB 200 MG/1
200 CAPSULE ORAL
COMMUNITY
Start: 2024-06-24

## 2025-04-22 RX ADMIN — KETOROLAC TROMETHAMINE 30 MG: 30 INJECTION, SOLUTION INTRAMUSCULAR at 01:04

## 2025-04-22 NOTE — DISCHARGE INSTRUCTIONS
PICC/MIDLINE instructions:    A chest x-ray will be taken to confirm placement for your PICC line. If you received a midline, no x-ray is needed.    You did not receive anesthesia for this procedure, so you may resume normal activities.  No blood pressures or needle sticks to affected extremity.  Keep line clean and dry.  Okay to drive and resume regular diet. Nausea is not an expected finding after this procedure.  Report to ER with any SUDDEN bleeding from site, signs of infection, shortness of breath or severe discomfort.

## 2025-04-22 NOTE — ED PROVIDER NOTES
Encounter Date: 2025       History     Chief Complaint   Patient presents with    Knee Pain     Pt c/o bilat knee pain beginning yest; knee Sx 2018, reports no pain since until today. Denies pain/injury. PICC line noted to R arm pt reports for CA.      69 year old female presents to ER complaining of bilateral knee pain since yesterday. She denies injury, erythema or swelling. Previous bilateral knee replacements. Patient reports about to start treatment for ovarian cancer. No improvement with Advil or Tramadol.     The history is provided by the patient. No  was used.     Review of patient's allergies indicates:   Allergen Reactions    Ezetimibe-simvastatin      Cramps  Can take Ezetimibe    Lovastatin      cramps    Pitavastatin      cramps    Pravastatin      Muscle cramps     Past Medical History:   Diagnosis Date    Arthritis     Charcot Kayla Tooth muscular atrophy     Fibromyalgia     GERD (gastroesophageal reflux disease)     Hyperlipidemia     Hypertension     Hypertension     Restless leg syndrome      Past Surgical History:   Procedure Laterality Date     SECTION      2     SECTION      FOOT SURGERY      JOINT REPLACEMENT      KNEE ARTHROPLASTY Right 2020    Procedure: ARTHROPLASTY, KNEE - [facility] [facility];  Surgeon: Chapito Elkins MD;  Location: [facility];  Service: Orthopedics;  Laterality: Right;    LITHOTRIPSY      around      REMOVAL OF PLANTAR WART USING LASER      SINUS SURGERY      2012    TONSILLECTOMY      TOTAL KNEE ARTHROPLASTY Left 2019    Procedure: ARTHROPLASTY, KNEE, TOTAL-[facility] [facility];  Surgeon: Chapito Elkins MD;  Location: [address];  Service: Orthopedics;  Laterality: Left;     Family History   Problem Relation Name Age of Onset    Cancer Mother          brain tumor , hip, blood stream, lungs    Cancer Father      Cancer Sister          lung tumor     Social History[1]  Review of Systems   Constitutional:  Negative for  fever.   Musculoskeletal:  Positive for arthralgias. Negative for joint swelling.   Skin:  Negative for color change and rash.   All other systems reviewed and are negative.      Physical Exam     Initial Vitals [04/22/25 1226]   BP Pulse Resp Temp SpO2   (!) 148/81 79 20 98.2 °F (36.8 °C) 97 %      MAP       --         Physical Exam    Nursing note and vitals reviewed.  Constitutional: She appears well-developed and well-nourished.   HENT:   Head: Normocephalic.   Eyes: EOM are normal.   Neck: Neck supple.   Cardiovascular:  Intact distal pulses.           Pulmonary/Chest: Breath sounds normal. No respiratory distress.   Musculoskeletal:      Cervical back: Neck supple.      Right knee: No swelling, effusion, erythema or ecchymosis. Normal range of motion. No tenderness. Normal pulse.      Left knee: No swelling, effusion, erythema or ecchymosis. Normal range of motion. No tenderness. Normal pulse.     Neurological: She is alert and oriented to person, place, and time.   Skin: Skin is warm and dry. Capillary refill takes less than 2 seconds. No erythema.   Psychiatric: She has a normal mood and affect.         ED Course   Procedures  Labs Reviewed - No data to display       Imaging Results              US Lower Extremity Veins Bilateral (Final result)  Result time 04/22/25 14:33:35      Final result by Leah Rivero MD (04/22/25 14:33:35)                   Impression:      No sonographic evidence of deep venous thrombosis within the lower extremities.      Electronically signed by: Leah Rivero  Date:    04/22/2025  Time:    14:33               Narrative:    EXAMINATION:  US LOWER EXTREMITY VEINS BILATERAL    CLINICAL HISTORY:  Pain in right knee    TECHNIQUE:  Multiplanar grayscale sonographic evaluation of the deep venous structures, with interrogation of the vessel patency utilizing color/spectral Doppler and compressive techniques.    COMPARISON:  None    FINDINGS:  Exam quality adequate    The  bilateral lower extremity deep venous structures demonstrate unremarkable gray scale morphology, compressibility, and spontaneous color flow. Additionally, normal spectral waveforms are documented, with unremarkable augmentation and respiratory variation.    Complex left popliteal cyst measures 3.9 x 0.9 x 2 cm in size.                                       X-Ray Knee 3 View Bilateral (Preliminary result)  Result time 04/22/25 14:45:44      Wet Read by Khushbu Reza FNP (04/22/25 14:45:44, [facility] - Emergency Dept, Emergency Medicine)    No acute findings. Orthopedic hardware intact.                                      Medications   ketorolac injection 30 mg (30 mg Intramuscular Given 4/22/25 1324)     Medical Decision Making  DDX: arthritis, joint effusion    69 year old female presents to ER complaining of bilateral knee pain since yesterday. She denies injury, erythema or swelling. Previous bilateral knee replacements. Patient reports about to start treatment for ovarian cancer. No improvement with Advil or Tramadol.  X-rays without acute osseous abnormality.  Ultrasound of bilateral lower extremities negative for DVT.  Patient did receive relief with ketorolac IM.  Patient currently on celebrex and tramadol.  Patient instructed to take with a meal. Follow up with PCP.       Wells' Criteria for DVT on 4/22/2025  ** All calculations should be rechecked by clinician prior to use **    RESULT SUMMARY:  1 points  Moderate risk group for DVT. See Next Steps for details.      INPUTS:  Active cancer -> 1 = Yes  Bedridden recently >3 days or major surgery within 12 weeks -> 0 = No  Calf swelling >3 cm compared to the other leg -> 0 = No  Collateral (nonvaricose) superficial veins present -> 0 = No  Entire leg swollen -> 0 = No  Localized tenderness along the deep venous system -> 0 = No  Pitting edema, confined to symptomatic leg -> 0 = No  Paralysis, paresis, or recent plaster immobilization of the  lower extremity -> 0 = No  Previously documented DVT -> 0 = No  Alternative diagnosis to DVT as likely or more likely -> 0 = No      Amount and/or Complexity of Data Reviewed  Radiology: ordered and independent interpretation performed.    Risk  Prescription drug management.                                      Clinical Impression:  Final diagnoses:  [M25.561, M25.562] Bilateral knee pain  [M25.561, M25.562] Acute pain of both knees (Primary)          ED Disposition Condition    Discharge Stable          ED Prescriptions    None       Follow-up Information    None            [1]   Social History  Tobacco Use    Smoking status: Former     Current packs/day: 0.00     Average packs/day: 0.5 packs/day for 30.0 years (15.0 ttl pk-yrs)     Types: Cigarettes     Start date: 1973     Quit date: 2003     Years since quittin.7    Smokeless tobacco: Never   Substance Use Topics    Alcohol use: Not Currently    Drug use: Never        Khushbu Reza, MARSHALL  25 [id number]

## 2025-04-24 DIAGNOSIS — C53.9 MALIGNANT NEOPLASM OF CERVIX, UNSPECIFIED SITE: Primary | ICD-10-CM

## 2025-04-28 ENCOUNTER — CLINICAL SUPPORT (OUTPATIENT)
Dept: HEMATOLOGY/ONCOLOGY | Facility: CLINIC | Age: 70
End: 2025-04-28
Payer: MEDICARE

## 2025-04-28 ENCOUNTER — OFFICE VISIT (OUTPATIENT)
Dept: HEMATOLOGY/ONCOLOGY | Facility: CLINIC | Age: 70
End: 2025-04-28
Payer: MEDICARE

## 2025-04-28 ENCOUNTER — LAB VISIT (OUTPATIENT)
Dept: LAB | Facility: HOSPITAL | Age: 70
End: 2025-04-28
Attending: INTERNAL MEDICINE
Payer: MEDICARE

## 2025-04-28 VITALS — WEIGHT: 173 LBS | HEIGHT: 62 IN | BODY MASS INDEX: 31.83 KG/M2

## 2025-04-28 DIAGNOSIS — C53.9 MALIGNANT NEOPLASM OF CERVIX, UNSPECIFIED SITE: Primary | ICD-10-CM

## 2025-04-28 DIAGNOSIS — C53.9 MALIGNANT NEOPLASM OF CERVIX, UNSPECIFIED SITE: ICD-10-CM

## 2025-04-28 LAB
ALBUMIN SERPL-MCNC: 3.5 G/DL (ref 3.4–4.8)
ALBUMIN/GLOB SERPL: 0.8 RATIO (ref 1.1–2)
ALP SERPL-CCNC: 89 UNIT/L (ref 40–150)
ALT SERPL-CCNC: 16 UNIT/L (ref 0–55)
ANION GAP SERPL CALC-SCNC: 9 MEQ/L
AST SERPL-CCNC: 19 UNIT/L (ref 11–45)
BASOPHILS # BLD AUTO: 0.04 X10(3)/MCL
BASOPHILS NFR BLD AUTO: 0.6 %
BILIRUB SERPL-MCNC: 0.5 MG/DL
BUN SERPL-MCNC: 25.8 MG/DL (ref 9.8–20.1)
CALCIUM SERPL-MCNC: 9.2 MG/DL (ref 8.4–10.2)
CHLORIDE SERPL-SCNC: 105 MMOL/L (ref 98–107)
CO2 SERPL-SCNC: 26 MMOL/L (ref 23–31)
CREAT SERPL-MCNC: 1.06 MG/DL (ref 0.55–1.02)
CREAT/UREA NIT SERPL: 24
EOSINOPHIL # BLD AUTO: 0.23 X10(3)/MCL (ref 0–0.9)
EOSINOPHIL NFR BLD AUTO: 3.6 %
ERYTHROCYTE [DISTWIDTH] IN BLOOD BY AUTOMATED COUNT: 12.9 % (ref 11.5–17)
GFR SERPLBLD CREATININE-BSD FMLA CKD-EPI: 57 ML/MIN/1.73/M2
GLOBULIN SER-MCNC: 4.5 GM/DL (ref 2.4–3.5)
GLUCOSE SERPL-MCNC: 102 MG/DL (ref 82–115)
HCT VFR BLD AUTO: 41.7 % (ref 37–47)
HGB BLD-MCNC: 13.7 G/DL (ref 12–16)
IMM GRANULOCYTES # BLD AUTO: 0.01 X10(3)/MCL (ref 0–0.04)
IMM GRANULOCYTES NFR BLD AUTO: 0.2 %
LYMPHOCYTES # BLD AUTO: 1.53 X10(3)/MCL (ref 0.6–4.6)
LYMPHOCYTES NFR BLD AUTO: 24.2 %
MCH RBC QN AUTO: 28.1 PG (ref 27–31)
MCHC RBC AUTO-ENTMCNC: 32.9 G/DL (ref 33–36)
MCV RBC AUTO: 85.5 FL (ref 80–94)
MONOCYTES # BLD AUTO: 0.7 X10(3)/MCL (ref 0.1–1.3)
MONOCYTES NFR BLD AUTO: 11.1 %
NEUTROPHILS # BLD AUTO: 3.8 X10(3)/MCL (ref 2.1–9.2)
NEUTROPHILS NFR BLD AUTO: 60.3 %
PLATELET # BLD AUTO: 211 X10(3)/MCL (ref 130–400)
PMV BLD AUTO: 9.6 FL (ref 7.4–10.4)
POTASSIUM SERPL-SCNC: 3.5 MMOL/L (ref 3.5–5.1)
PROT SERPL-MCNC: 8 GM/DL (ref 5.8–7.6)
RBC # BLD AUTO: 4.88 X10(6)/MCL (ref 4.2–5.4)
SODIUM SERPL-SCNC: 140 MMOL/L (ref 136–145)
WBC # BLD AUTO: 6.31 X10(3)/MCL (ref 4.5–11.5)

## 2025-04-28 PROCEDURE — 99215 OFFICE O/P EST HI 40 MIN: CPT | Mod: S$GLB,COE,,

## 2025-04-28 PROCEDURE — 3008F BODY MASS INDEX DOCD: CPT | Mod: CPTII,S$GLB,COE,

## 2025-04-28 PROCEDURE — 85025 COMPLETE CBC W/AUTO DIFF WBC: CPT

## 2025-04-28 PROCEDURE — 1101F PT FALLS ASSESS-DOCD LE1/YR: CPT | Mod: CPTII,S$GLB,COE,

## 2025-04-28 PROCEDURE — 99999 PR PBB SHADOW E&M-EST. PATIENT-LVL I: CPT | Mod: PBBFAC,COE,,

## 2025-04-28 PROCEDURE — 3044F HG A1C LEVEL LT 7.0%: CPT | Mod: CPTII,S$GLB,COE,

## 2025-04-28 PROCEDURE — 3288F FALL RISK ASSESSMENT DOCD: CPT | Mod: CPTII,S$GLB,COE,

## 2025-04-28 PROCEDURE — 77334 RADIATION TREATMENT AID(S): CPT | Performed by: RADIOLOGY

## 2025-04-28 PROCEDURE — 99999 PR PBB SHADOW E&M-EST. PATIENT-LVL III: CPT | Mod: PBBFAC,COE,,

## 2025-04-28 PROCEDURE — 36415 COLL VENOUS BLD VENIPUNCTURE: CPT

## 2025-04-28 PROCEDURE — 1159F MED LIST DOCD IN RCRD: CPT | Mod: CPTII,S$GLB,COE,

## 2025-04-28 PROCEDURE — 80053 COMPREHEN METABOLIC PANEL: CPT

## 2025-04-28 RX ORDER — DEXAMETHASONE 4 MG/1
8 TABLET ORAL DAILY
Qty: 6 TABLET | Refills: 5 | Status: SHIPPED | OUTPATIENT
Start: 2025-04-28

## 2025-04-28 RX ORDER — PROCHLORPERAZINE MALEATE 5 MG
5 TABLET ORAL EVERY 6 HOURS PRN
Qty: 20 TABLET | Refills: 5 | Status: SHIPPED | OUTPATIENT
Start: 2025-04-28

## 2025-04-28 RX ORDER — OLANZAPINE 5 MG/1
TABLET, FILM COATED ORAL
Qty: 4 TABLET | Refills: 5 | Status: SHIPPED | OUTPATIENT
Start: 2025-04-28

## 2025-04-28 NOTE — PROGRESS NOTES
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 Regular diet as tolerated    Nutrition Assessment    4/28/25: This is a 69 y.o.female with a medical diagnosis of cervical CA. She reports good appetite and po intake. No c/o n/v/c/d. She was on "weight loss injections" but has stopped and wt loss is stable. Tolerates a regular diet at home.    Nutrition Factors Affecting Intake  none identified    PMHx: GERD, HLD, HTN    Allergies: Ezetimibe-simvastatin, Lovastatin, Pitavastatin, and Pravastatin    Current Medications:  Current Medications[1]    Labs: no new    Anthropometrics    Height:   Ht Readings from Last 1 Encounters:   04/22/25 5' 2" (1.575 m)      Weight:   Wt Readings from Last 1 Encounters:   04/22/25 77.1 kg (170 lb)        Usual Body Weight: 77.1 kg (170 lb)  % Weight Change: 0    BMI: 31.09 m2  Class I Obesity (30-34.9)    Ideal Weight: 50 kg (110 lb)  % Ideal Weight: 155%      Nutrition Diagnosis    PES: Food and nutrition related knowledge deficit related to chronic illness as evidenced by lack of prior exposure to oncology nutrition. (resolved)     Nutrition Risk  low    Nutrition Intervention    Interventions(treatment strategy):  general/healthful diet and purpose of nutrition education    Education Provided: food safety guidelines and general healthy diet  Teaching Method: explanation and printed materials  Comprehension: verbalizes understanding  Barriers to Learning: none evident  Expected Compliance: good  Comments: All questions were answered and dietitian's contact information was provided.      Nutrition Monitoring and Evaluation    Ongoing monitoring not warranted at this time. Please consult RD " prn.        Bethanie Casey, MS, RD, , LDN                                                                                                                                                                                                                                                                                       [1]   Current Outpatient Medications:     amLODIPine (NORVASC) 5 MG tablet, Take 5 mg by mouth once daily. Take in am of surgery, Disp: , Rfl:     amLODIPine (NORVASC) 5 MG tablet, Take 5 mg by mouth., Disp: , Rfl:     celecoxib (CELEBREX) 200 MG capsule, Take 200 mg by mouth 2 (two) times daily. Hold for 1 week prior to surgery, Disp: , Rfl:     celecoxib (CELEBREX) 200 MG capsule, Take 200 mg by mouth., Disp: , Rfl:     DULoxetine (CYMBALTA) 30 MG capsule, Take 30 mg by mouth once daily. Take in am of surgery, Disp: , Rfl:     DULoxetine (CYMBALTA) 30 MG capsule, Take 1 capsule by mouth once daily., Disp: , Rfl:     esomeprazole (NEXIUM) 40 MG capsule, Take 40 mg by mouth once daily. Take in am of surgery, Disp: , Rfl:     esomeprazole (NEXIUM) 40 MG capsule, Take 1 capsule by mouth every morning., Disp: , Rfl:     ezetimibe (ZETIA) 10 mg tablet, Take 10 mg by mouth every evening. Takes at night, Disp: , Rfl:     ezetimibe (ZETIA) 10 mg tablet, Take 10 mg by mouth., Disp: , Rfl:     gabapentin (NEURONTIN) 300 MG capsule, Take 300 mg by mouth 2 (two) times daily. Take if needed, Disp: , Rfl:     hydroCHLOROthiazide (HYDRODIURIL) 25 MG tablet, Take 25 mg by mouth once daily. Hold am of surgery, Disp: , Rfl:     hydroCHLOROthiazide (HYDRODIURIL) 25 MG tablet, Take 25 mg by mouth., Disp: , Rfl:     pramipexole (MIRAPEX) 0.5 MG tablet, Take 0.5 mg by mouth., Disp: , Rfl:     rOPINIRole (REQUIP) 4 MG tablet, Takes at night, Disp: , Rfl:     traMADol (ULTRAM) 50 mg tablet, 2 (two) times daily as needed. Pain   Take if needed, Disp: , Rfl:     traMADoL (ULTRAM) 50 mg tablet, Take 100 mg by mouth., Disp: ,  Rfl:      Morganella in Blood cx, on antibiotic  possible UTI, low suspicion for LE infection per vascular  ID on board

## 2025-04-28 NOTE — PROGRESS NOTES
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 appears to cause obstruction with distension of the endometrial cavity with fluid.    Pathology:      CLINICAL HISTORY:      Patient: Lora Goins is a 69 y.o. female. with history of kindly referred for cervical cancer.    Patient initially presented with watery vaginal discharge in March of this year that eventually progress to blood tinged discharge.  She did not have any other symptoms associated with it.  Patient did have a pelvic ultrasound on 03/14/2025 that showed and hypoechoic mass in the cervix measuring 5.3 x 4 x 4 cm.  There was distention of the endometrial cavity with fluid.  There was no free pelvic fluid.  Uterus was 11 x 5 x 5 cm.      She underwent cervical biopsy on 03/19/2025 that showed moderately differentiated squamous cell carcinoma arising in the background of extensive severe dysplasia with areas of necrosis.  Of note she has history of cervical dysplasia for which she received cryotherapy over 20 years ago.     She was seen by Dr. Jeremy Browne on 04/04/2025 because of the size of the tumor she is not a surgical candidate.    She is here today with her .  She is doing relatively well and voices no concerns.  No family history of breast, ovarian cancer    Chief Complaint: Therapy Education       Interval History:    4/28/25: Mrs. Goins presents to the clinic for therapy education accompanied by her . Patient reports no major complaints at today's visit. Denies fever, chills, SOB,  N/V/D, constipation, recent infection, chest pain or unexplained bleeding or bruising.        Past Medical History:   Diagnosis Date    Arthritis     Charcot Kayla Tooth muscular atrophy     Fibromyalgia     GERD (gastroesophageal reflux disease)     Hyperlipidemia     Hypertension     Hypertension     Restless leg syndrome       Past Surgical History:   Procedure Laterality Date     SECTION      2     SECTION      FOOT SURGERY      JOINT REPLACEMENT      KNEE ARTHROPLASTY Right 2020    Procedure: ARTHROPLASTY, KNEE - [employer];  Surgeon: Chapito Elkins MD;  Location: [facility] OR;  Service: Orthopedics;  Laterality: Right;    LITHOTRIPSY      around      PERIPHERALLY INSERTED CENTRAL CATHETER INSERTION N/A 2025    Procedure: Insertion-picc;  Surgeon: Nicole Adair MD;  Location: [facility] OR;  Service: Oncology;  Laterality: N/A;    REMOVAL OF PLANTAR WART USING LASER      SINUS SURGERY          TONSILLECTOMY      TOTAL KNEE ARTHROPLASTY Left 2019    Procedure: ARTHROPLASTY, KNEE, TOTAL-[employer];  Surgeon: Chapito Elkins MD;  Location: [address];  Service: Orthopedics;  Laterality: Left;     Family History   Problem Relation Name Age of Onset    Cancer Mother          brain tumor , hip, blood stream, lungs    Cancer Father      Cancer Sister          lung tumor          Review of patient's allergies indicates:   Allergen Reactions    Ezetimibe-simvastatin      Cramps  Can take Ezetimibe    Lovastatin      cramps    Pitavastatin      cramps    Pravastatin      Muscle cramps      Medications Ordered Prior to Encounter[1]   Review of Systems   Constitutional:  Negative for activity change, appetite change, chills, fatigue, fever, night sweats and unexpected weight change.   HENT:  Negative for mouth dryness, mouth sores, nosebleeds, sore throat and trouble swallowing.    Eyes: Negative.  Negative for visual disturbance.   Respiratory:  Negative for cough and  "shortness of breath.    Cardiovascular:  Negative for chest pain, palpitations and leg swelling.   Gastrointestinal:  Positive for abdominal pain. Negative for abdominal distention, blood in stool, change in bowel habit, constipation, diarrhea, nausea and vomiting.   Endocrine: Negative.    Genitourinary:  Negative for dysuria, frequency, hematuria and urgency.   Musculoskeletal:  Positive for back pain. Negative for arthralgias, myalgias and neck pain.   Integumentary:  Negative for rash.   Neurological:  Negative for dizziness, tremors, syncope, speech difficulty, weakness, light-headedness, numbness, headaches and memory loss.   Hematological:  Negative for adenopathy. Does not bruise/bleed easily.   Psychiatric/Behavioral:  Negative for confusion and suicidal ideas. The patient is not nervous/anxious.        Vitals:    04/28/25 1457   Weight: 78.5 kg (173 lb)   Height: 5' 2" (1.575 m)        Wt Readings from Last 6 Encounters:   04/22/25 77.1 kg (170 lb)   04/16/25 77.7 kg (171 lb 4.8 oz)   03/19/25 74.4 kg (164 lb)   03/01/25 78 kg (171 lb 15.3 oz)   06/26/24 78.9 kg (174 lb)   07/28/20 74.8 kg (165 lb)     Body mass index is 31.64 kg/m².  Body surface area is 1.85 meters squared.        Laboratory:  CBC with Differential:  Lab Results   Component Value Date    WBC 5.99 04/16/2025    RBC 4.85 04/16/2025    HGB 13.5 04/16/2025    HCT 41.4 04/16/2025    MCV 85.4 04/16/2025    MCH 27.8 04/16/2025    MCHC 32.6 (L) 04/16/2025    RDW 13.0 04/16/2025     04/16/2025    MPV 9.5 04/16/2025    GRAN 3.2 07/27/2020    GRAN 52.6 07/27/2020    LYMPH 2.3 07/27/2020    LYMPH 37.3 07/27/2020    MONO 0.4 07/27/2020    MONO 6.5 07/27/2020    EOS 0.2 11/23/2022    BASO 0.0 11/23/2022    EOSINOPHIL 4 11/23/2022    BASOPHIL 0.8 07/27/2020        CMP:  Sodium   Date Value Ref Range Status   04/16/2025 140 136 - 145 mmol/L Final   07/27/2020 142 136 - 145 mmol/L Final     Potassium   Date Value Ref Range Status   04/16/2025 4.8 " 3.5 - 5.1 mmol/L Final   07/27/2020 3.9 3.5 - 5.1 mmol/L Final     Chloride   Date Value Ref Range Status   04/16/2025 104 98 - 107 mmol/L Final   07/27/2020 103 95 - 110 mmol/L Final     CO2   Date Value Ref Range Status   04/16/2025 29 23 - 31 mmol/L Final   07/27/2020 30 (H) 23 - 29 mmol/L Final     Glucose   Date Value Ref Range Status   07/27/2020 161 (H) 70 - 110 mg/dL Final     BUN   Date Value Ref Range Status   07/27/2020 19 8 - 23 mg/dL Final     Blood Urea Nitrogen   Date Value Ref Range Status   04/16/2025 23.9 (H) 9.8 - 20.1 mg/dL Final     Creatinine   Date Value Ref Range Status   04/16/2025 1.01 0.55 - 1.02 mg/dL Final   07/27/2020 1.0 0.5 - 1.4 mg/dL Final     Calcium   Date Value Ref Range Status   04/16/2025 9.4 8.4 - 10.2 mg/dL Final   07/27/2020 9.4 8.7 - 10.5 mg/dL Final     Albumin   Date Value Ref Range Status   04/16/2025 3.7 3.4 - 4.8 g/dL Final     Bilirubin Total   Date Value Ref Range Status   04/16/2025 0.5 <=1.5 mg/dL Final     ALP   Date Value Ref Range Status   04/16/2025 79 40 - 150 unit/L Final     AST   Date Value Ref Range Status   04/16/2025 20 11 - 45 unit/L Final     ALT   Date Value Ref Range Status   04/16/2025 14 0 - 55 unit/L Final     Anion Gap   Date Value Ref Range Status   07/27/2020 9 8 - 16 mmol/L Final     eGFR if    Date Value Ref Range Status   07/27/2020 >60.0 >60 mL/min/1.73 m^2 Final     eGFR if non    Date Value Ref Range Status   07/27/2020 59.7 (A) >60 mL/min/1.73 m^2 Final     Comment:     Calculation used to obtain the estimated glomerular filtration  rate (eGFR) is the CKD-EPI equation.                Assessment:     1. Malignant neoplasm of cervix, unspecified site      Plan:   -Therapy education completed on 4/28/25.  -Plans to start weekly Cisplatin with concurrent radiation on 5/6/25.  Patient understood that she will receive premedications given 30 minutes prior to each treatment to help prevent nausea. As well as pre  and post hydration with Cisplatin   -Antiemetic regimen schedule given and calendar made for guidance    Dexamethasone- Take 1 tablet by mouth daily on Days 2-4 of  each treatment.    Olanzapine- Take 1 tablet by mouth daily in the evening on Days 1-4 of each treatment   -PICC line intact.  -Compazine PRN prescribed for at home with instructions to be taken by mouth every 6 hours as needed for nausea.   -OTC Imodium AD recommended for diarrhea (4-5 BMs a day). Take 2 tablets after the first loose bowel movement, and 1 tablet after each loose bowel movement after the first dose has been taken. No more than 4 tablets should be taken in any 24-hour period. If not working, Lomotil can be prescribed as 2nd choice.    -Mouth sore prevention with 1-quart warm water with 1 tsp of baking soda and salt and alcohol-free mouthwash.   -Emphasized adequate hand-hygiene and limited contact with people who are sick.   -Monitor and notify any bleeding in urine, stool, or sputum.  As well as unusual bleeding or bruising and stomach pain.   - Emphasized hydration with 4 16 oz bottles of water a day.    -Importance of moisturizing with fragrance free lotion to prevent skin rash.  -Call clinic if fever >100.4, shakes or chills, shortness of breath, chest pain, uncontrolled vomiting or diarrhea, pain and tingling in the chest or arm, or just not feeling well.    -Plans to RTC on 5/5/25  TD/same day labs/infusion next day (Monday for TD)    DISCUSSION:    1.  A total of 60 minutes were spent in counseling today, in which 100% were face-to-face.  At today's therapy teaching session, we discussed the patient's cancer diagnosis as well as planned therapy regimen, protocol, side effects and toxicities.  A handout of each therapeutic agent in the regimen was provided and reviewed in detail.    2.  The following side effects were discussed but not limited to:    Cisplatin Side Effects:  Allergic Reactions  Anemia  Breathing  Problems  Bruising  Chills  Cough  Feeling Faint  Fever  Infection  Injury  Itching  Mouth Sores  Nausea  Numbness  Pain  Rash  Swelling  Tingling  Vomiting                a.  Discussed the risk of infection while on therapy related to pancytopenia, specifically a decrease in their white blood cell count.  Instructed to contact our office for temperature >100.4 F, chills, sudden onset cough or shortness of breath, symptoms of a urinary tract infection.                b.  Discussed the risk of anemia. Instructed to contact our office for dizziness, heart palpitations, or extreme or sudden changes in weakness.                c.  Discussed the risk of thrombocytopenia, which increases the risk of bruising or bleeding.  Instructed the patient to contact our office for spontaneous signs of bleeding, including nose bleeds, bleeding from the gums or mouth, blood in sputum, urine or stool and unusual or excessive bruising or rash.                d.  Discussed GI side effects including weight changes, changes in appetite, altered sense of taste, stomatitis, nausea, vomiting, diarrhea, constipation, and heartburn.                e.  Discussed  side effects including painful urination, changes in the amount of urination, possible urine color changes.  Discussed fertility issues and to prevent  pregnancy if of child bearing age.                f.  Discussed neurological side effects including the risk of peripheral neuropathy, either temporary or permanent.                g.  Discussed the potential for skin, hair, and nail changes.       3.  Instructed to contact our office for discussion of medication changes, the addition of vitamin and/or herbal supplementation as they may interact with some chemotherapy agents.    4.  Discussed dietary modifications and the need to maintain adequate caloric intake and proper oral hydration.  Recommended 64 ounces of fluid per day.    5.  Discussed anti-emetic protocol and bowel regimen  protocol.    6.  Office contact information given including after hours number.  Discussed there is an oncologist on call 24/7, 365 days including weekends.  Provided primary nurse's information .    7.  In summary, the patient is in agreement with the plan of care.  Questions appeared to be answered to their satisfaction. Consented the patient to the treatment plan and the patient was educated on the planned duration of the treatment and schedule of the treatment administration. Copy to be scanned into the chart.    All questions answered to the satisfaction of the patient and family.     Follow up appointments given to patient.       DANIELLE DONAHUE  PATIENT EDUCATOR  [facility]                                     [1]   Current Outpatient Medications on File Prior to Visit   Medication Sig Dispense Refill    amLODIPine (NORVASC) 5 MG tablet Take 5 mg by mouth once daily. Take in am of surgery      amLODIPine (NORVASC) 5 MG tablet Take 5 mg by mouth.      celecoxib (CELEBREX) 200 MG capsule Take 200 mg by mouth 2 (two) times daily. Hold for 1 week prior to surgery      celecoxib (CELEBREX) 200 MG capsule Take 200 mg by mouth.      DULoxetine (CYMBALTA) 30 MG capsule Take 30 mg by mouth once daily. Take in am of surgery      DULoxetine (CYMBALTA) 30 MG capsule Take 1 capsule by mouth once daily.      esomeprazole (NEXIUM) 40 MG capsule Take 40 mg by mouth once daily. Take in am of surgery      esomeprazole (NEXIUM) 40 MG capsule Take 1 capsule by mouth every morning.      ezetimibe (ZETIA) 10 mg tablet Take 10 mg by mouth every evening. Takes at night      ezetimibe (ZETIA) 10 mg tablet Take 10 mg by mouth.      gabapentin (NEURONTIN) 300 MG capsule Take 300 mg by mouth 2 (two) times daily. Take if needed      hydroCHLOROthiazide (HYDRODIURIL) 25 MG tablet Take 25 mg by mouth once daily. Hold am of surgery      hydroCHLOROthiazide (HYDRODIURIL) 25 MG tablet Take 25 mg by mouth.      pramipexole  (MIRAPEX) 0.5 MG tablet Take 0.5 mg by mouth.      rOPINIRole (REQUIP) 4 MG tablet Takes at night      traMADol (ULTRAM) 50 mg tablet 2 (two) times daily as needed. Pain    Take if needed      traMADoL (ULTRAM) 50 mg tablet Take 100 mg by mouth.       No current facility-administered medications on file prior to visit.

## 2025-04-28 NOTE — NURSING
"Oncology Navigation   Intake  Date of Diagnosis: 25  Cancer Type: Gynecologic     Treatment     Surgical Oncologist: Dr. Jeremy Browne  Consult Date: 25    Medical Oncologist: Dr. Nicole Adair  Consult Date: 25  Chemotherapy: Planned       Procedures: Port / PICC  Port / PICC Schedule Date: 25             Support Systems: Spouse/significant other; Family members  Barriers of Care: Barriers to Care "Assessment completed-no barriers noted"     Acuity  Stage: 1  Systemic Treatment - predicted or initiated: More than one treatment modality concurrently (chemotherapy, radiation, etc.) (+2)  Treatment Tolerability: Has not started treatment yet/treatment fully completed and side effects resolved  ECO  Comorbidities in Medical History: 2  Hospitalization Within the Past Month: 0   Needed: 0  Support: 0  Verbalizes Financial Concerns: 0  Transportation: 0  Mental Health: PHQ Score: 0 (Distress 0/10)  History of noncompliance/frequent no shows and cancellations: 0  Verbalizes the need for more education: 0  Navigation Acuity: 6     Follow Up  2 weeks     Met with patient today following education visit. She is accompanied by her . I have met with patient previously. Assessed for barriers to care, no changes. Patient has seen radiation oncology but is not scheduled yet. I contacted rad onc and confirmed that they will not be ready to start this week but likely will be ready next week. Dr. Adair notified. Patient's treatment pushed back to next week. Patient has no further questions or concerns. She is aware of how to reach navigation should she need to.   "

## 2025-04-29 DIAGNOSIS — C53.9 MALIGNANT NEOPLASM OF CERVIX, UNSPECIFIED SITE: Primary | ICD-10-CM

## 2025-04-29 DIAGNOSIS — R97.8 OTHER ABNORMAL TUMOR MARKERS: ICD-10-CM

## 2025-04-29 NOTE — PROGRESS NOTES
HEMATOLOGY/ONCOLOGY OFFICE CLINIC VISIT    Visit Information:  Visit type:    Follow up - Oncology  Accompanied by:     Source of history:  Self  History limitation:  None      Initial Evaluation:  04/16/2025  Referring Provider:  Dr. Jeremy Browne  Other providers: Dr. 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 appears to cause obstruction with distension of the endometrial cavity with fluid. There was distention of the endometrial cavity with fluid.  There was no free pelvic fluid.  Uterus was 11 x 5 x 5 cm.   MRI pelvis 4/24/2025:A 5 cm in greatest dimension cervical cancer with invasion of the lower third of the uterus and upper third of the vagina. Bilateral parametrial invasion. No evidence of pelvic sidewall invasion. Right external iliac lymphadenopathy. The mass encroaches upon but does not clearly invade the posterior wall of the bladder. The mass involves the anterior wall of the upper third of the vagina. The tumor extends within 1 cm of the right pelvic sidewall, but does not encroach upon the left pelvic sidewall. The mass invades the lower third of the uterus.   PET CT 4/24/2025: A 5 x 4 cm hypermetabolic cervical mass (Max SUV 12.3). There is a 1.2 cm right external iliac lymph node with elevated uptake (Max SUV 5.3). No other suspicious activity in the abdomen or pelvis.     Pathology:  3/19/2025:  cervical biopsy:  MODERATELY DIFFERENTIATED SQUAMOUS CELL CARCINOMA, ARISING IN A BACKGROUND OF EXTENSIVE SEVERE DYSPLASIA.    THE TUMOR IS PARTIALLY NECROTIC.      IMMUNOREACTION RESULTS:   P63: EXHIBITS NUCLEAR POSITIVITY IN NEOPLASTIC CELLS.   P16: EXHIBITS BLOCK POSITIVITY IN NEOPLASTIC CELLS.   CONTROL REACTIONS ARE APPROPRIATE.     Comment: Cervical squamous cell carcinoma is favored. No endometrium is identified.     CLINICAL HISTORY:       Patient: Lora Goins is a 69 y.o. female. with history of kindly referred for   cervical cancer.    Patient initially presented with watery vaginal discharge in March of this year that eventually progress to blood tinged discharge.  She did not have any other symptoms associated with it.  Patient did have a pelvic ultrasound on 03/14/2025 that showed and hypoechoic mass in the cervix measuring 5.3 x 4 x 4 cm.  There was distention of the endometrial cavity with fluid.  There was no free pelvic fluid.  Uterus was 11 x 5 x 5 cm.      She underwent cervical biopsy on 03/19/2025 that showed moderately differentiated squamous cell carcinoma arising in the background of extensive severe dysplasia with areas of necrosis.  Of note she has history of cervical dysplasia for which she received cryotherapy over 20 years ago.     She was seen by Dr. Jeremy Browne on 04/04/2025 because of the size of the tumor she is not a surgical candidate.    She is here today with her .  She is doing relatively well and voices no concerns.  No family history of breast, ovarian cancer    Chief Complaint: 3 Week Follow Up      Interval History:  05/05/25: Patient presents today for 3 week follow up for her cervical cancer. Patient due for radiation/treatment on 05/06/25, Cisplatin C1. Pt denies fever, chills, or sweats. No chest pain or SOB. She is doing well, no complaints today.       Past Medical History:   Diagnosis Date    Arthritis     Charcot Kayla Tooth muscular atrophy     Fibromyalgia     GERD (gastroesophageal reflux disease)     Hyperlipidemia     Hypertension     Hypertension     Restless leg syndrome       Past Surgical History:    Procedure Laterality Date     SECTION      2     SECTION      FOOT SURGERY      JOINT REPLACEMENT      KNEE ARTHROPLASTY Right 2020    Procedure: ARTHROPLASTY, KNEE - [employer];  Surgeon: Chapito Elkins MD;  Location: [facility] OR;  Service: Orthopedics;  Laterality: Right;    LITHOTRIPSY      around      PERIPHERALLY INSERTED CENTRAL CATHETER INSERTION N/A 2025    Procedure: Insertion-picc;  Surgeon: Nicole Adair MD;  Location: [facility] OR;  Service: Oncology;  Laterality: N/A;    REMOVAL OF PLANTAR WART USING LASER      SINUS SURGERY          TONSILLECTOMY      TOTAL KNEE ARTHROPLASTY Left 2019    Procedure: ARTHROPLASTY, KNEE, TOTAL-[employer];  Surgeon: Chapito Elkins MD;  Location: [address];  Service: Orthopedics;  Laterality: Left;     Family History   Problem Relation Name Age of Onset    Cancer Mother          brain tumor , hip, blood stream, lungs    Cancer Father      Cancer Sister          lung tumor          Review of patient's allergies indicates:   Allergen Reactions    Ezetimibe-simvastatin      Cramps  Can take Ezetimibe    Lovastatin      cramps    Pitavastatin      cramps    Pravastatin      Muscle cramps      Medications Ordered Prior to Encounter[1]   Review of Systems   Constitutional:  Negative for activity change, appetite change, chills, fatigue, fever, night sweats and unexpected weight change.   HENT:  Negative for mouth dryness, mouth sores, nosebleeds, sore throat and trouble swallowing.    Eyes: Negative.  Negative for visual disturbance.   Respiratory:  Negative for cough and shortness of breath.    Cardiovascular:  Negative for chest pain, palpitations and leg swelling.   Gastrointestinal:  Negative for abdominal distention, abdominal pain, blood in stool, change in bowel habit, constipation, diarrhea, nausea and vomiting.   Endocrine: Negative.    Genitourinary:  Negative for dysuria, frequency, hematuria and urgency.  "  Musculoskeletal:  Negative for arthralgias, back pain, myalgias and neck pain.   Integumentary:  Negative for rash.   Neurological:  Negative for dizziness, tremors, syncope, speech difficulty, weakness, light-headedness, numbness, headaches and memory loss.   Hematological:  Negative for adenopathy. Does not bruise/bleed easily.   Psychiatric/Behavioral:  Negative for confusion and suicidal ideas. The patient is not nervous/anxious.               Vitals:    05/05/25 1511   BP: 125/86   BP Location: Left arm   Patient Position: Sitting   Pulse: 80   Resp: 18   Temp: 97.1 °F (36.2 °C)   TempSrc: Oral   SpO2: 98%   Weight: 78.2 kg (172 lb 4.8 oz)   Height: 5' 2" (1.575 m)        Wt Readings from Last 6 Encounters:   05/05/25 78.2 kg (172 lb 4.8 oz)   04/28/25 78.5 kg (173 lb)   04/22/25 77.1 kg (170 lb)   04/16/25 77.7 kg (171 lb 4.8 oz)   03/19/25 74.4 kg (164 lb)   03/01/25 78 kg (171 lb 15.3 oz)     Body mass index is 31.51 kg/m².  Body surface area is 1.85 meters squared.  Physical Exam  Vitals and nursing note reviewed.   Constitutional:       General: She is not in acute distress.     Appearance: Normal appearance. She is well-developed.   HENT:      Head: Normocephalic and atraumatic.      Mouth/Throat:      Mouth: Mucous membranes are moist.   Eyes:      General: No scleral icterus.     Extraocular Movements: Extraocular movements intact.      Conjunctiva/sclera: Conjunctivae normal.      Pupils: Pupils are equal, round, and reactive to light.   Neck:      Vascular: No JVD.   Cardiovascular:      Rate and Rhythm: Normal rate and regular rhythm.      Heart sounds: No murmur heard.  Pulmonary:      Effort: Pulmonary effort is normal.      Breath sounds: Normal breath sounds. No wheezing or rhonchi.   Abdominal:      General: Bowel sounds are normal. There is no distension.      Palpations: Abdomen is soft. There is no mass.      Tenderness: There is no abdominal tenderness.   Musculoskeletal:         General: " No swelling or deformity.      Cervical back: Neck supple.      Comments: PICC line in place    Lymphadenopathy:      Head:      Right side of head: No submental or submandibular adenopathy.      Left side of head: No submental or submandibular adenopathy.      Cervical: No cervical adenopathy.      Lower Body: No right inguinal adenopathy. No left inguinal adenopathy.   Skin:     General: Skin is warm.      Coloration: Skin is not jaundiced.      Findings: No lesion or rash.      Nails: There is no clubbing.   Neurological:      General: No focal deficit present.      Mental Status: She is alert and oriented to person, place, and time.      Cranial Nerves: Cranial nerves 2-12 are intact.   Psychiatric:         Attention and Perception: Attention normal.         Behavior: Behavior is cooperative.         Judgment: Judgment normal.       ECOG SCORE    0 - Fully active-able to carry on all pre-disease performance without restriction         Laboratory:  CBC with Differential:  Lab Results   Component Value Date    WBC 6.34 05/05/2025    RBC 4.82 05/05/2025    HGB 13.5 05/05/2025    HCT 40.7 05/05/2025    MCV 84.4 05/05/2025    MCH 28.0 05/05/2025    MCHC 33.2 05/05/2025    RDW 12.9 05/05/2025     05/05/2025    MPV 9.5 05/05/2025    GRAN 3.2 07/27/2020    GRAN 52.6 07/27/2020    LYMPH 2.3 07/27/2020    LYMPH 37.3 07/27/2020    MONO 0.4 07/27/2020    MONO 6.5 07/27/2020    EOS 0.2 11/23/2022    BASO 0.0 11/23/2022    EOSINOPHIL 4 11/23/2022    BASOPHIL 0.8 07/27/2020        CMP:  Sodium   Date Value Ref Range Status   05/05/2025 140 136 - 145 mmol/L Final   07/27/2020 142 136 - 145 mmol/L Final     Potassium   Date Value Ref Range Status   05/05/2025 4.0 3.5 - 5.1 mmol/L Final   07/27/2020 3.9 3.5 - 5.1 mmol/L Final     Chloride   Date Value Ref Range Status   05/05/2025 103 98 - 107 mmol/L Final   07/27/2020 103 95 - 110 mmol/L Final     CO2   Date Value Ref Range Status   05/05/2025 27 23 - 31 mmol/L Final    07/27/2020 30 (H) 23 - 29 mmol/L Final     Glucose   Date Value Ref Range Status   05/05/2025 73 (L) 82 - 115 mg/dL Final   07/27/2020 161 (H) 70 - 110 mg/dL Final     BUN   Date Value Ref Range Status   07/27/2020 19 8 - 23 mg/dL Final     Blood Urea Nitrogen   Date Value Ref Range Status   05/05/2025 21.2 (H) 9.8 - 20.1 mg/dL Final     Creatinine   Date Value Ref Range Status   05/05/2025 0.94 0.55 - 1.02 mg/dL Final   07/27/2020 1.0 0.5 - 1.4 mg/dL Final     Calcium   Date Value Ref Range Status   05/05/2025 9.2 8.4 - 10.2 mg/dL Final   07/27/2020 9.4 8.7 - 10.5 mg/dL Final     Protein Total   Date Value Ref Range Status   05/05/2025 7.6 5.8 - 7.6 gm/dL Final     Albumin   Date Value Ref Range Status   05/05/2025 3.5 3.4 - 4.8 g/dL Final     Bilirubin Total   Date Value Ref Range Status   05/05/2025 0.5 <=1.5 mg/dL Final     ALP   Date Value Ref Range Status   05/05/2025 84 40 - 150 unit/L Final     AST   Date Value Ref Range Status   05/05/2025 19 11 - 45 unit/L Final     ALT   Date Value Ref Range Status   05/05/2025 19 0 - 55 unit/L Final     Anion Gap   Date Value Ref Range Status   07/27/2020 9 8 - 16 mmol/L Final     eGFR if    Date Value Ref Range Status   07/27/2020 >60.0 >60 mL/min/1.73 m^2 Final     eGFR if non    Date Value Ref Range Status   07/27/2020 59.7 (A) >60 mL/min/1.73 m^2 Final     Comment:     Calculation used to obtain the estimated glomerular filtration  rate (eGFR) is the CKD-EPI equation.                Assessment:       1. Malignant neoplasm of cervix, unspecified site        Discussed with the patient her diagnosis, prognosis and treatment recommendations according to the NCCN guidelines.  Discussed briefly chemotherapy, risks versus benefits as well as toxicities associated with it.  Patient with good knowledge of what is going on as this was discussed by Dr. Browne during her visit.    Educated the patient on the risks versus benefits as well as  toxicities associated with treatment.  Verbally consented the patient to the treatment plan and the patient was educated on the planned duration of the treatment and schedule of the treatment administration.  All questions were answered.        Plan:         Cisplatin Q weekly while on XRT  RTC in 1 week with NP/same day labs/infusion next day   Labs weekly: CBC, CMP    The patient was seen, interviewed and examined. Pertinent lab and radiology studies were reviewed.   The patient was given ample opportunity to ask questions, and to the best of my abilities, all questions answered to satisfaction; patient demonstrated understanding of what we discussed and agreeable to the plan. Pt instructed to call should develop concerning signs/symptoms or have further questions.     Visit today included increased complexity associated with the care of the episodic problem treatment clearance, addressing and managing the longitudinal care of the patient's cervical cancer.     Nicole Adair MD  Hematology/Oncology    Professional Services   I, Edda Haywood LPN, acted solely as a scribe for and in the presence of Dr. Nicole Adair, who performed these services.               [1]   Current Outpatient Medications on File Prior to Visit   Medication Sig Dispense Refill    amLODIPine (NORVASC) 5 MG tablet Take 5 mg by mouth once daily. Take in am of surgery      amLODIPine (NORVASC) 5 MG tablet Take 5 mg by mouth.      celecoxib (CELEBREX) 200 MG capsule Take 200 mg by mouth 2 (two) times daily. Hold for 1 week prior to surgery      celecoxib (CELEBREX) 200 MG capsule Take 200 mg by mouth.      dexAMETHasone (DECADRON) 4 MG Tab Take 2 tablets (8 mg total) by mouth once daily. With food on days 2-4 of each chemotherapy cycle. 6 tablet 5    DULoxetine (CYMBALTA) 30 MG capsule Take 30 mg by mouth once daily. Take in am of surgery      DULoxetine (CYMBALTA) 30 MG capsule Take 1 capsule by mouth once daily.      esomeprazole  (NEXIUM) 40 MG capsule Take 40 mg by mouth once daily. Take in am of surgery      esomeprazole (NEXIUM) 40 MG capsule Take 1 capsule by mouth every morning.      ezetimibe (ZETIA) 10 mg tablet Take 10 mg by mouth every evening. Takes at night      ezetimibe (ZETIA) 10 mg tablet Take 10 mg by mouth.      gabapentin (NEURONTIN) 300 MG capsule Take 300 mg by mouth 2 (two) times daily. Take if needed      hydroCHLOROthiazide (HYDRODIURIL) 25 MG tablet Take 25 mg by mouth once daily. Hold am of surgery      hydroCHLOROthiazide (HYDRODIURIL) 25 MG tablet Take 25 mg by mouth.      OLANZapine (ZYPREXA) 5 MG tablet Take 1 tablet by mouth nightly on days 1-4 of each chemotherapy cycle. 4 tablet 5    pramipexole (MIRAPEX) 0.5 MG tablet Take 0.5 mg by mouth.      prochlorperazine (COMPAZINE) 5 MG tablet Take 1 tablet (5 mg total) by mouth every 6 (six) hours as needed for Nausea. 20 tablet 5    rOPINIRole (REQUIP) 4 MG tablet Takes at night      traMADol (ULTRAM) 50 mg tablet 2 (two) times daily as needed. Pain    Take if needed      traMADoL (ULTRAM) 50 mg tablet Take 100 mg by mouth.       No current facility-administered medications on file prior to visit.

## 2025-05-01 ENCOUNTER — CLINICAL SUPPORT (OUTPATIENT)
Dept: RADIATION THERAPY | Facility: HOSPITAL | Age: 70
End: 2025-05-01
Attending: RADIOLOGY
Payer: MEDICARE

## 2025-05-02 PROCEDURE — 77301 RADIOTHERAPY DOSE PLAN IMRT: CPT | Performed by: RADIOLOGY

## 2025-05-02 PROCEDURE — 77300 RADIATION THERAPY DOSE PLAN: CPT | Performed by: RADIOLOGY

## 2025-05-02 PROCEDURE — 77338 DESIGN MLC DEVICE FOR IMRT: CPT | Performed by: RADIOLOGY

## 2025-05-05 ENCOUNTER — OFFICE VISIT (OUTPATIENT)
Dept: HEMATOLOGY/ONCOLOGY | Facility: CLINIC | Age: 70
End: 2025-05-05
Payer: MEDICARE

## 2025-05-05 ENCOUNTER — LAB VISIT (OUTPATIENT)
Dept: LAB | Facility: HOSPITAL | Age: 70
End: 2025-05-05
Attending: INTERNAL MEDICINE
Payer: MEDICARE

## 2025-05-05 VITALS
SYSTOLIC BLOOD PRESSURE: 125 MMHG | OXYGEN SATURATION: 98 % | HEIGHT: 62 IN | RESPIRATION RATE: 18 BRPM | WEIGHT: 172.31 LBS | TEMPERATURE: 97 F | HEART RATE: 80 BPM | BODY MASS INDEX: 31.71 KG/M2 | DIASTOLIC BLOOD PRESSURE: 86 MMHG

## 2025-05-05 DIAGNOSIS — C53.9 MALIGNANT NEOPLASM OF CERVIX, UNSPECIFIED SITE: Primary | ICD-10-CM

## 2025-05-05 DIAGNOSIS — R97.8 OTHER ABNORMAL TUMOR MARKERS: ICD-10-CM

## 2025-05-05 DIAGNOSIS — C53.9 MALIGNANT NEOPLASM OF CERVIX, UNSPECIFIED SITE: ICD-10-CM

## 2025-05-05 LAB
ALBUMIN SERPL-MCNC: 3.5 G/DL (ref 3.4–4.8)
ALBUMIN/GLOB SERPL: 0.9 RATIO (ref 1.1–2)
ALP SERPL-CCNC: 84 UNIT/L (ref 40–150)
ALT SERPL-CCNC: 19 UNIT/L (ref 0–55)
ANION GAP SERPL CALC-SCNC: 10 MEQ/L
AST SERPL-CCNC: 19 UNIT/L (ref 11–45)
BASOPHILS # BLD AUTO: 0.03 X10(3)/MCL
BASOPHILS NFR BLD AUTO: 0.5 %
BILIRUB SERPL-MCNC: 0.5 MG/DL
BUN SERPL-MCNC: 21.2 MG/DL (ref 9.8–20.1)
CALCIUM SERPL-MCNC: 9.2 MG/DL (ref 8.4–10.2)
CHLORIDE SERPL-SCNC: 103 MMOL/L (ref 98–107)
CO2 SERPL-SCNC: 27 MMOL/L (ref 23–31)
CREAT SERPL-MCNC: 0.94 MG/DL (ref 0.55–1.02)
CREAT/UREA NIT SERPL: 23
EOSINOPHIL # BLD AUTO: 0.17 X10(3)/MCL (ref 0–0.9)
EOSINOPHIL NFR BLD AUTO: 2.7 %
ERYTHROCYTE [DISTWIDTH] IN BLOOD BY AUTOMATED COUNT: 12.9 % (ref 11.5–17)
GFR SERPLBLD CREATININE-BSD FMLA CKD-EPI: >60 ML/MIN/1.73/M2
GLOBULIN SER-MCNC: 4.1 GM/DL (ref 2.4–3.5)
GLUCOSE SERPL-MCNC: 73 MG/DL (ref 82–115)
HCT VFR BLD AUTO: 40.7 % (ref 37–47)
HGB BLD-MCNC: 13.5 G/DL (ref 12–16)
IMM GRANULOCYTES # BLD AUTO: 0.01 X10(3)/MCL (ref 0–0.04)
IMM GRANULOCYTES NFR BLD AUTO: 0.2 %
LYMPHOCYTES # BLD AUTO: 1.51 X10(3)/MCL (ref 0.6–4.6)
LYMPHOCYTES NFR BLD AUTO: 23.8 %
MCH RBC QN AUTO: 28 PG (ref 27–31)
MCHC RBC AUTO-ENTMCNC: 33.2 G/DL (ref 33–36)
MCV RBC AUTO: 84.4 FL (ref 80–94)
MONOCYTES # BLD AUTO: 0.68 X10(3)/MCL (ref 0.1–1.3)
MONOCYTES NFR BLD AUTO: 10.7 %
NEUTROPHILS # BLD AUTO: 3.94 X10(3)/MCL (ref 2.1–9.2)
NEUTROPHILS NFR BLD AUTO: 62.1 %
PLATELET # BLD AUTO: 210 X10(3)/MCL (ref 130–400)
PMV BLD AUTO: 9.5 FL (ref 7.4–10.4)
POTASSIUM SERPL-SCNC: 4 MMOL/L (ref 3.5–5.1)
PROT SERPL-MCNC: 7.6 GM/DL (ref 5.8–7.6)
RBC # BLD AUTO: 4.82 X10(6)/MCL (ref 4.2–5.4)
SODIUM SERPL-SCNC: 140 MMOL/L (ref 136–145)
WBC # BLD AUTO: 6.34 X10(3)/MCL (ref 4.5–11.5)

## 2025-05-05 PROCEDURE — 85025 COMPLETE CBC W/AUTO DIFF WBC: CPT

## 2025-05-05 PROCEDURE — 3288F FALL RISK ASSESSMENT DOCD: CPT | Mod: CPTII,S$GLB,COE, | Performed by: INTERNAL MEDICINE

## 2025-05-05 PROCEDURE — 1160F RVW MEDS BY RX/DR IN RCRD: CPT | Mod: CPTII,S$GLB,COE, | Performed by: INTERNAL MEDICINE

## 2025-05-05 PROCEDURE — 99999 PR PBB SHADOW E&M-EST. PATIENT-LVL V: CPT | Mod: PBBFAC,COE,, | Performed by: INTERNAL MEDICINE

## 2025-05-05 PROCEDURE — 99215 OFFICE O/P EST HI 40 MIN: CPT | Mod: S$GLB,COE,, | Performed by: INTERNAL MEDICINE

## 2025-05-05 PROCEDURE — 3074F SYST BP LT 130 MM HG: CPT | Mod: CPTII,S$GLB,COE, | Performed by: INTERNAL MEDICINE

## 2025-05-05 PROCEDURE — 1101F PT FALLS ASSESS-DOCD LE1/YR: CPT | Mod: CPTII,S$GLB,COE, | Performed by: INTERNAL MEDICINE

## 2025-05-05 PROCEDURE — 36415 COLL VENOUS BLD VENIPUNCTURE: CPT

## 2025-05-05 PROCEDURE — 3044F HG A1C LEVEL LT 7.0%: CPT | Mod: CPTII,S$GLB,COE, | Performed by: INTERNAL MEDICINE

## 2025-05-05 PROCEDURE — 3008F BODY MASS INDEX DOCD: CPT | Mod: CPTII,S$GLB,COE, | Performed by: INTERNAL MEDICINE

## 2025-05-05 PROCEDURE — 1159F MED LIST DOCD IN RCRD: CPT | Mod: CPTII,S$GLB,COE, | Performed by: INTERNAL MEDICINE

## 2025-05-05 PROCEDURE — 3079F DIAST BP 80-89 MM HG: CPT | Mod: CPTII,S$GLB,COE, | Performed by: INTERNAL MEDICINE

## 2025-05-05 PROCEDURE — G2211 COMPLEX E/M VISIT ADD ON: HCPCS | Mod: S$GLB,COE,, | Performed by: INTERNAL MEDICINE

## 2025-05-05 PROCEDURE — 1126F AMNT PAIN NOTED NONE PRSNT: CPT | Mod: CPTII,S$GLB,COE, | Performed by: INTERNAL MEDICINE

## 2025-05-05 PROCEDURE — 80053 COMPREHEN METABOLIC PANEL: CPT

## 2025-05-06 ENCOUNTER — INFUSION (OUTPATIENT)
Dept: INFUSION THERAPY | Facility: HOSPITAL | Age: 70
End: 2025-05-06
Attending: INTERNAL MEDICINE
Payer: MEDICARE

## 2025-05-06 VITALS
HEIGHT: 62 IN | OXYGEN SATURATION: 96 % | HEART RATE: 74 BPM | SYSTOLIC BLOOD PRESSURE: 120 MMHG | BODY MASS INDEX: 31.71 KG/M2 | TEMPERATURE: 98 F | WEIGHT: 172.31 LBS | DIASTOLIC BLOOD PRESSURE: 76 MMHG | RESPIRATION RATE: 18 BRPM

## 2025-05-06 DIAGNOSIS — C53.9 MALIGNANT NEOPLASM OF CERVIX, UNSPECIFIED SITE: Primary | ICD-10-CM

## 2025-05-06 PROCEDURE — 77386 HC IMRT, COMPLEX: CPT | Performed by: RADIOLOGY

## 2025-05-06 PROCEDURE — 96367 TX/PROPH/DG ADDL SEQ IV INF: CPT

## 2025-05-06 PROCEDURE — 96366 THER/PROPH/DIAG IV INF ADDON: CPT

## 2025-05-06 PROCEDURE — 25000003 PHARM REV CODE 250: Performed by: INTERNAL MEDICINE

## 2025-05-06 PROCEDURE — 96413 CHEMO IV INFUSION 1 HR: CPT

## 2025-05-06 PROCEDURE — 63600175 PHARM REV CODE 636 W HCPCS: Performed by: INTERNAL MEDICINE

## 2025-05-06 PROCEDURE — A4216 STERILE WATER/SALINE, 10 ML: HCPCS | Performed by: INTERNAL MEDICINE

## 2025-05-06 PROCEDURE — 96375 TX/PRO/DX INJ NEW DRUG ADDON: CPT

## 2025-05-06 PROCEDURE — 96361 HYDRATE IV INFUSION ADD-ON: CPT

## 2025-05-06 RX ORDER — SODIUM CHLORIDE 0.9 % (FLUSH) 0.9 %
10 SYRINGE (ML) INJECTION
Status: DISCONTINUED | OUTPATIENT
Start: 2025-05-06 | End: 2025-05-06 | Stop reason: HOSPADM

## 2025-05-06 RX ORDER — HEPARIN 100 UNIT/ML
500 SYRINGE INTRAVENOUS
Status: DISCONTINUED | OUTPATIENT
Start: 2025-05-06 | End: 2025-05-06 | Stop reason: HOSPADM

## 2025-05-06 RX ORDER — PROCHLORPERAZINE EDISYLATE 5 MG/ML
5 INJECTION INTRAMUSCULAR; INTRAVENOUS ONCE AS NEEDED
Status: DISCONTINUED | OUTPATIENT
Start: 2025-05-06 | End: 2025-05-06 | Stop reason: HOSPADM

## 2025-05-06 RX ORDER — EPINEPHRINE 0.3 MG/.3ML
0.3 INJECTION SUBCUTANEOUS ONCE AS NEEDED
Status: DISCONTINUED | OUTPATIENT
Start: 2025-05-06 | End: 2025-05-06 | Stop reason: HOSPADM

## 2025-05-06 RX ORDER — DIPHENHYDRAMINE HYDROCHLORIDE 50 MG/ML
50 INJECTION, SOLUTION INTRAMUSCULAR; INTRAVENOUS ONCE AS NEEDED
Status: DISCONTINUED | OUTPATIENT
Start: 2025-05-06 | End: 2025-05-06 | Stop reason: HOSPADM

## 2025-05-06 RX ADMIN — POTASSIUM CHLORIDE 500 ML/HR: 2 INJECTION, SOLUTION, CONCENTRATE INTRAVENOUS at 08:05

## 2025-05-06 RX ADMIN — HEPARIN 500 UNITS: 100 SYRINGE at 01:05

## 2025-05-06 RX ADMIN — APREPITANT 130 MG: 130 INJECTION, EMULSION INTRAVENOUS at 10:05

## 2025-05-06 RX ADMIN — CISPLATIN 56 MG: 1 INJECTION, SOLUTION INTRAVENOUS at 11:05

## 2025-05-06 RX ADMIN — DEXAMETHASONE SODIUM PHOSPHATE 0.25 MG: 4 INJECTION, SOLUTION INTRA-ARTICULAR; INTRALESIONAL; INTRAMUSCULAR; INTRAVENOUS; SOFT TISSUE at 10:05

## 2025-05-06 RX ADMIN — Medication 10 ML: at 01:05

## 2025-05-06 RX ADMIN — SODIUM CHLORIDE: 9 INJECTION, SOLUTION INTRAVENOUS at 10:05

## 2025-05-06 RX ADMIN — SODIUM CHLORIDE 500 ML/HR: 9 INJECTION, SOLUTION INTRAVENOUS at 11:05

## 2025-05-06 NOTE — PROGRESS NOTES
HEMATOLOGY/ONCOLOGY OFFICE CLINIC VISIT    Visit Information:  Visit type:    Follow up - Oncology  Accompanied by:     Source of history:  Self  History limitation:  None      Initial Evaluation:  04/16/2025  Referring Provider:  Dr. Jeremy Browne  Other providers: Dr. 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 appears to cause obstruction with distension of the endometrial cavity with fluid. There was distention of the endometrial cavity with fluid.  There was no free pelvic fluid.  Uterus was 11 x 5 x 5 cm.   MRI pelvis 4/24/2025:A 5 cm in greatest dimension cervical cancer with invasion of the lower third of the uterus and upper third of the vagina. Bilateral parametrial invasion. No evidence of pelvic sidewall invasion. Right external iliac lymphadenopathy. The mass encroaches upon but does not clearly invade the posterior wall of the bladder. The mass involves the anterior wall of the upper third of the vagina. The tumor extends within 1 cm of the right pelvic sidewall, but does not encroach upon the left pelvic sidewall. The mass invades the lower third of the uterus.   PET CT 4/24/2025: A 5 x 4 cm hypermetabolic cervical mass (Max SUV 12.3). There is a 1.2 cm right external iliac lymph node with elevated uptake (Max SUV 5.3). No other suspicious activity in the abdomen or pelvis.     Pathology:  3/19/2025:  cervical biopsy:  MODERATELY DIFFERENTIATED SQUAMOUS CELL CARCINOMA, ARISING IN A BACKGROUND OF EXTENSIVE SEVERE DYSPLASIA.    THE TUMOR IS PARTIALLY NECROTIC.      IMMUNOREACTION RESULTS:   P63: EXHIBITS NUCLEAR POSITIVITY IN NEOPLASTIC CELLS.   P16: EXHIBITS BLOCK POSITIVITY IN NEOPLASTIC CELLS.   CONTROL REACTIONS ARE APPROPRIATE.     Comment: Cervical squamous cell carcinoma is favored. No endometrium is identified.     CLINICAL HISTORY:       Patient: Lora Goins is a 69 y.o. female. with history of kindly referred for   cervical cancer.    Patient initially presented with watery vaginal discharge in March of this year that eventually progress to blood tinged discharge.  She did not have any other symptoms associated with it.  Patient did have a pelvic ultrasound on 03/14/2025 that showed and hypoechoic mass in the cervix measuring 5.3 x 4 x 4 cm.  There was distention of the endometrial cavity with fluid.  There was no free pelvic fluid.  Uterus was 11 x 5 x 5 cm.      She underwent cervical biopsy on 03/19/2025 that showed moderately differentiated squamous cell carcinoma arising in the background of extensive severe dysplasia with areas of necrosis.  Of note she has history of cervical dysplasia for which she received cryotherapy over 20 years ago.     She was seen by Dr. Jeremy Browne on 04/04/2025 because of the size of the tumor she is not a surgical candidate.    She is here today with her .  She is doing relatively well and voices no concerns.  No family history of breast, ovarian cancer    Chief Complaint: 1 Week Follow Up (Patient states she been having diarrhea since yesterday and this morning.  )      Interval History:  05/12/25: Patient presents today for 1 week follow up for her cervical cancer. She is due for Cisplatin C2, on 05/13/25 okay to treat.  Patient states she has had diarrhea for a couple days, better today. She also had some headache. She does report some swelling in her legs, advised to elevate and stay hydrated. She reports that the swelling improves when elevated legs. Pt denies fever, chills, or sweats. No chest pain or SOB.  Overall, she is feeling ok. Labs reviewed and stable for pt    Past Medical History:   Diagnosis Date    Arthritis     Charcot Kayla Tooth muscular atrophy     Fibromyalgia     GERD (gastroesophageal reflux disease)     Hyperlipidemia     Hypertension     Hypertension     Restless leg syndrome       Past Surgical History:   Procedure Laterality Date     SECTION      2     SECTION      FOOT SURGERY      JOINT REPLACEMENT      KNEE ARTHROPLASTY Right 2020    Procedure: ARTHROPLASTY, KNEE - [employer];  Surgeon: Chapito Elkins MD;  Location: [facility] OR;  Service: Orthopedics;  Laterality: Right;    LITHOTRIPSY      around      PERIPHERALLY INSERTED CENTRAL CATHETER INSERTION N/A 2025    Procedure: Insertion-picc;  Surgeon: Nicole Adair MD;  Location: [facility] OR;  Service: Oncology;  Laterality: N/A;    REMOVAL OF PLANTAR WART USING LASER      SINUS SURGERY          TONSILLECTOMY      TOTAL KNEE ARTHROPLASTY Left 2019    Procedure: ARTHROPLASTY, KNEE, TOTAL-[employer]SY;  Surgeon: Chapito Elkins MD;  Location: [address];  Service: Orthopedics;  Laterality: Left;     Family History   Problem Relation Name Age of Onset    Cancer Mother          brain tumor , hip, blood stream, lungs    Cancer Father      Cancer Sister          lung tumor          Review of patient's allergies indicates:   Allergen Reactions    Ezetimibe-simvastatin      Cramps  Can take Ezetimibe    Lovastatin      cramps    Pitavastatin      cramps    Pravastatin      Muscle cramps      Medications Ordered Prior to Encounter[1]   Review of Systems   Constitutional:  Negative for activity change, appetite change, chills, fatigue, fever, night sweats and unexpected weight change.   HENT:  Negative for mouth dryness, mouth sores, nosebleeds, sore throat and trouble swallowing.    Eyes: Negative.  Negative for visual disturbance.   Respiratory:  Negative for cough and shortness of breath.   "  Cardiovascular:  Negative for chest pain, palpitations and leg swelling.   Gastrointestinal:  Negative for abdominal distention, abdominal pain, blood in stool, change in bowel habit, constipation, diarrhea, nausea and vomiting.   Endocrine: Negative.    Genitourinary:  Negative for dysuria, frequency, hematuria and urgency.   Musculoskeletal:  Negative for arthralgias, back pain, myalgias and neck pain.   Integumentary:  Negative for rash.   Neurological:  Positive for headaches. Negative for dizziness, tremors, syncope, speech difficulty, weakness, light-headedness, numbness and memory loss.   Hematological:  Negative for adenopathy. Does not bruise/bleed easily.   Psychiatric/Behavioral:  Negative for confusion and suicidal ideas. The patient is not nervous/anxious.               Vitals:    05/12/25 0952   BP: 121/77   BP Location: Left arm   Patient Position: Sitting   Pulse: 83   Resp: 18   Temp: 98.1 °F (36.7 °C)   TempSrc: Oral   SpO2: 98%   Weight: 80.6 kg (177 lb 9.6 oz)   Height: 5' 2" (1.575 m)          Wt Readings from Last 6 Encounters:   05/12/25 80.6 kg (177 lb 9.6 oz)   05/06/25 78.2 kg (172 lb 4.8 oz)   05/05/25 78.2 kg (172 lb 4.8 oz)   04/28/25 78.5 kg (173 lb)   04/22/25 77.1 kg (170 lb)   04/16/25 77.7 kg (171 lb 4.8 oz)     Body mass index is 32.48 kg/m².  Body surface area is 1.88 meters squared.  Physical Exam  Vitals and nursing note reviewed.   Constitutional:       General: She is not in acute distress.     Appearance: Normal appearance. She is well-developed.   HENT:      Head: Normocephalic and atraumatic.      Mouth/Throat:      Mouth: Mucous membranes are moist.   Eyes:      General: No scleral icterus.     Extraocular Movements: Extraocular movements intact.      Conjunctiva/sclera: Conjunctivae normal.      Pupils: Pupils are equal, round, and reactive to light.   Neck:      Vascular: No JVD.   Cardiovascular:      Rate and Rhythm: Normal rate and regular rhythm.      Heart sounds: " No murmur heard.  Pulmonary:      Effort: Pulmonary effort is normal.      Breath sounds: Normal breath sounds. No wheezing or rhonchi.   Abdominal:      General: Bowel sounds are normal. There is no distension.      Palpations: Abdomen is soft. There is no mass.      Tenderness: There is no abdominal tenderness.   Musculoskeletal:         General: No swelling or deformity.      Cervical back: Neck supple.      Comments: PICC line in place    Lymphadenopathy:      Head:      Right side of head: No submental or submandibular adenopathy.      Left side of head: No submental or submandibular adenopathy.      Cervical: No cervical adenopathy.      Lower Body: No right inguinal adenopathy. No left inguinal adenopathy.   Skin:     General: Skin is warm.      Coloration: Skin is not jaundiced.      Findings: No lesion or rash.      Nails: There is no clubbing.   Neurological:      General: No focal deficit present.      Mental Status: She is alert and oriented to person, place, and time.      Cranial Nerves: Cranial nerves 2-12 are intact.   Psychiatric:         Attention and Perception: Attention normal.         Behavior: Behavior is cooperative.         Judgment: Judgment normal.       ECOG SCORE    0 - Fully active-able to carry on all pre-disease performance without restriction         Laboratory:  CBC with Differential:  Lab Results   Component Value Date    WBC 5.72 05/12/2025    RBC 4.67 05/12/2025    HGB 13.0 05/12/2025    HCT 40.1 05/12/2025    MCV 85.9 05/12/2025    MCH 27.8 05/12/2025    MCHC 32.4 (L) 05/12/2025    RDW 13.7 05/12/2025     05/12/2025    MPV 9.7 05/12/2025    GRAN 3.2 07/27/2020    GRAN 52.6 07/27/2020    LYMPH 2.3 07/27/2020    LYMPH 37.3 07/27/2020    MONO 0.4 07/27/2020    MONO 6.5 07/27/2020    EOS 0.2 11/23/2022    BASO 0.0 11/23/2022    EOSINOPHIL 4 11/23/2022    BASOPHIL 0.8 07/27/2020        CMP:  Sodium   Date Value Ref Range Status   05/12/2025 140 136 - 145 mmol/L Final    07/27/2020 142 136 - 145 mmol/L Final     Potassium   Date Value Ref Range Status   05/12/2025 3.9 3.5 - 5.1 mmol/L Final   07/27/2020 3.9 3.5 - 5.1 mmol/L Final     Chloride   Date Value Ref Range Status   05/12/2025 104 98 - 107 mmol/L Final   07/27/2020 103 95 - 110 mmol/L Final     CO2   Date Value Ref Range Status   05/12/2025 27 23 - 31 mmol/L Final   07/27/2020 30 (H) 23 - 29 mmol/L Final     Glucose   Date Value Ref Range Status   05/12/2025 95 82 - 115 mg/dL Final   07/27/2020 161 (H) 70 - 110 mg/dL Final     BUN   Date Value Ref Range Status   07/27/2020 19 8 - 23 mg/dL Final     Blood Urea Nitrogen   Date Value Ref Range Status   05/12/2025 31.6 (H) 9.8 - 20.1 mg/dL Final     Creatinine   Date Value Ref Range Status   05/12/2025 0.85 0.55 - 1.02 mg/dL Final   07/27/2020 1.0 0.5 - 1.4 mg/dL Final     Calcium   Date Value Ref Range Status   05/12/2025 9.0 8.4 - 10.2 mg/dL Final   07/27/2020 9.4 8.7 - 10.5 mg/dL Final     Protein Total   Date Value Ref Range Status   05/12/2025 7.3 5.8 - 7.6 gm/dL Final     Albumin   Date Value Ref Range Status   05/12/2025 3.4 3.4 - 4.8 g/dL Final     Bilirubin Total   Date Value Ref Range Status   05/12/2025 0.9 <=1.5 mg/dL Final     ALP   Date Value Ref Range Status   05/12/2025 80 40 - 150 unit/L Final     AST   Date Value Ref Range Status   05/12/2025 16 11 - 45 unit/L Final     ALT   Date Value Ref Range Status   05/12/2025 19 0 - 55 unit/L Final     Anion Gap   Date Value Ref Range Status   07/27/2020 9 8 - 16 mmol/L Final     eGFR if    Date Value Ref Range Status   07/27/2020 >60.0 >60 mL/min/1.73 m^2 Final     eGFR if non    Date Value Ref Range Status   07/27/2020 59.7 (A) >60 mL/min/1.73 m^2 Final     Comment:     Calculation used to obtain the estimated glomerular filtration  rate (eGFR) is the CKD-EPI equation.                Assessment:       1. Malignant neoplasm of cervix, unspecified site    2. 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 Pertinent lab and radiology studies were reviewed.   The patient was given ample opportunity to ask questions, and to the best of my abilities, all questions answered to satisfaction; patient demonstrated understanding of what we discussed and agreeable to the plan. Pt instructed to call should develop concerning signs/symptoms or have further questions.     Visit today included increased complexity associated with the care of the episodic problem treatment clearance, addressing and managing the longitudinal care of the patient's cervical cancer.     Nicole Adair MD  Hematology/Oncology    Professional Services   I, Edda Haywood LPN, acted solely as a scribe for and in the presence of Dr. Nicole Adair, who performed these services.                 [1]   Current Outpatient Medications on File Prior to Visit   Medication Sig Dispense Refill    amLODIPine (NORVASC) 5 MG tablet Take 5 mg by mouth.      celecoxib (CELEBREX) 200 MG capsule Take 200 mg by mouth.      dexAMETHasone (DECADRON) 4 MG Tab Take 2 tablets (8 mg total) by mouth once daily. With food on days 2-4 of each chemotherapy cycle. 6 tablet 5    DULoxetine (CYMBALTA) 30 MG capsule Take 1 capsule by mouth once daily.      esomeprazole (NEXIUM) 40 MG capsule Take 1 capsule by mouth every morning.      ezetimibe (ZETIA) 10 mg tablet Take 10 mg by mouth every evening. Takes at night      gabapentin (NEURONTIN) 300 MG capsule Take 300 mg by mouth 2 (two) times daily. Take if needed      hydroCHLOROthiazide (HYDRODIURIL) 25 MG tablet Take 25 mg by mouth.      OLANZapine (ZYPREXA) 5  MG tablet Take 1 tablet by mouth nightly on days 1-4 of each chemotherapy cycle. 4 tablet 5    pramipexole (MIRAPEX) 0.5 MG tablet Take 0.5 mg by mouth.      prochlorperazine (COMPAZINE) 5 MG tablet Take 1 tablet (5 mg total) by mouth every 6 (six) hours as needed for Nausea. 20 tablet 5    rOPINIRole (REQUIP) 4 MG tablet Takes at night      traMADol (ULTRAM) 50 mg tablet 2 (two) times daily as needed. Pain    Take if needed      amLODIPine (NORVASC) 5 MG tablet Take 5 mg by mouth once daily. Take in am of surgery (Patient not taking: Reported on 5/12/2025)      celecoxib (CELEBREX) 200 MG capsule Take 200 mg by mouth 2 (two) times daily. Hold for 1 week prior to surgery (Patient not taking: Reported on 5/12/2025)      DULoxetine (CYMBALTA) 30 MG capsule Take 30 mg by mouth once daily. Take in am of surgery (Patient not taking: Reported on 5/12/2025)      esomeprazole (NEXIUM) 40 MG capsule Take 40 mg by mouth once daily. Take in am of surgery (Patient not taking: Reported on 5/12/2025)      ezetimibe (ZETIA) 10 mg tablet Take 10 mg by mouth. (Patient not taking: Reported on 5/12/2025)      hydroCHLOROthiazide (HYDRODIURIL) 25 MG tablet Take 25 mg by mouth once daily. Hold am of surgery (Patient not taking: Reported on 5/12/2025)      traMADoL (ULTRAM) 50 mg tablet Take 100 mg by mouth. (Patient not taking: Reported on 5/12/2025)       No current facility-administered medications on file prior to visit.

## 2025-05-06 NOTE — PLAN OF CARE
Plan of care reviewed with patient. C1 Cisplatin + PICC care completed. Appts reviewed with patient; patient uses portal.

## 2025-05-07 PROCEDURE — 77386 HC IMRT, COMPLEX: CPT | Performed by: RADIOLOGY

## 2025-05-08 PROCEDURE — 77386 HC IMRT, COMPLEX: CPT | Performed by: RADIOLOGY

## 2025-05-09 PROCEDURE — 77386 HC IMRT, COMPLEX: CPT | Performed by: RADIOLOGY

## 2025-05-12 ENCOUNTER — LAB VISIT (OUTPATIENT)
Dept: LAB | Facility: HOSPITAL | Age: 70
End: 2025-05-12
Attending: INTERNAL MEDICINE
Payer: MEDICARE

## 2025-05-12 ENCOUNTER — OFFICE VISIT (OUTPATIENT)
Dept: HEMATOLOGY/ONCOLOGY | Facility: CLINIC | Age: 70
End: 2025-05-12
Payer: MEDICARE

## 2025-05-12 VITALS
DIASTOLIC BLOOD PRESSURE: 77 MMHG | HEIGHT: 62 IN | RESPIRATION RATE: 18 BRPM | OXYGEN SATURATION: 98 % | TEMPERATURE: 98 F | HEART RATE: 83 BPM | BODY MASS INDEX: 32.69 KG/M2 | SYSTOLIC BLOOD PRESSURE: 121 MMHG | WEIGHT: 177.63 LBS

## 2025-05-12 DIAGNOSIS — D84.821 IMMUNODEFICIENCY SECONDARY TO CHEMOTHERAPY: ICD-10-CM

## 2025-05-12 DIAGNOSIS — T45.1X5A IMMUNODEFICIENCY SECONDARY TO CHEMOTHERAPY: ICD-10-CM

## 2025-05-12 DIAGNOSIS — C53.9 MALIGNANT NEOPLASM OF CERVIX, UNSPECIFIED SITE: ICD-10-CM

## 2025-05-12 DIAGNOSIS — Z79.69 IMMUNODEFICIENCY SECONDARY TO CHEMOTHERAPY: ICD-10-CM

## 2025-05-12 DIAGNOSIS — C53.9 MALIGNANT NEOPLASM OF CERVIX, UNSPECIFIED SITE: Primary | ICD-10-CM

## 2025-05-12 DIAGNOSIS — Z79.69 ON ANTINEOPLASTIC CHEMOTHERAPY: ICD-10-CM

## 2025-05-12 LAB
ALBUMIN SERPL-MCNC: 3.4 G/DL (ref 3.4–4.8)
ALBUMIN/GLOB SERPL: 0.9 RATIO (ref 1.1–2)
ALP SERPL-CCNC: 80 UNIT/L (ref 40–150)
ALT SERPL-CCNC: 19 UNIT/L (ref 0–55)
ANION GAP SERPL CALC-SCNC: 9 MEQ/L
AST SERPL-CCNC: 16 UNIT/L (ref 11–45)
BASOPHILS # BLD AUTO: 0.02 X10(3)/MCL
BASOPHILS NFR BLD AUTO: 0.3 %
BILIRUB SERPL-MCNC: 0.9 MG/DL
BUN SERPL-MCNC: 31.6 MG/DL (ref 9.8–20.1)
CALCIUM SERPL-MCNC: 9 MG/DL (ref 8.4–10.2)
CHLORIDE SERPL-SCNC: 104 MMOL/L (ref 98–107)
CO2 SERPL-SCNC: 27 MMOL/L (ref 23–31)
CREAT SERPL-MCNC: 0.85 MG/DL (ref 0.55–1.02)
CREAT/UREA NIT SERPL: 37
EOSINOPHIL # BLD AUTO: 0.14 X10(3)/MCL (ref 0–0.9)
EOSINOPHIL NFR BLD AUTO: 2.4 %
ERYTHROCYTE [DISTWIDTH] IN BLOOD BY AUTOMATED COUNT: 13.7 % (ref 11.5–17)
GFR SERPLBLD CREATININE-BSD FMLA CKD-EPI: >60 ML/MIN/1.73/M2
GLOBULIN SER-MCNC: 3.9 GM/DL (ref 2.4–3.5)
GLUCOSE SERPL-MCNC: 95 MG/DL (ref 82–115)
HCT VFR BLD AUTO: 40.1 % (ref 37–47)
HGB BLD-MCNC: 13 G/DL (ref 12–16)
IMM GRANULOCYTES # BLD AUTO: 0.06 X10(3)/MCL (ref 0–0.04)
IMM GRANULOCYTES NFR BLD AUTO: 1 %
LYMPHOCYTES # BLD AUTO: 0.8 X10(3)/MCL (ref 0.6–4.6)
LYMPHOCYTES NFR BLD AUTO: 14 %
MCH RBC QN AUTO: 27.8 PG (ref 27–31)
MCHC RBC AUTO-ENTMCNC: 32.4 G/DL (ref 33–36)
MCV RBC AUTO: 85.9 FL (ref 80–94)
MONOCYTES # BLD AUTO: 0.44 X10(3)/MCL (ref 0.1–1.3)
MONOCYTES NFR BLD AUTO: 7.7 %
NEUTROPHILS # BLD AUTO: 4.26 X10(3)/MCL (ref 2.1–9.2)
NEUTROPHILS NFR BLD AUTO: 74.6 %
PLATELET # BLD AUTO: 183 X10(3)/MCL (ref 130–400)
PMV BLD AUTO: 9.7 FL (ref 7.4–10.4)
POTASSIUM SERPL-SCNC: 3.9 MMOL/L (ref 3.5–5.1)
PROT SERPL-MCNC: 7.3 GM/DL (ref 5.8–7.6)
RBC # BLD AUTO: 4.67 X10(6)/MCL (ref 4.2–5.4)
SODIUM SERPL-SCNC: 140 MMOL/L (ref 136–145)
WBC # BLD AUTO: 5.72 X10(3)/MCL (ref 4.5–11.5)

## 2025-05-12 PROCEDURE — 1101F PT FALLS ASSESS-DOCD LE1/YR: CPT | Mod: CPTII,S$GLB,COE, | Performed by: INTERNAL MEDICINE

## 2025-05-12 PROCEDURE — 3288F FALL RISK ASSESSMENT DOCD: CPT | Mod: CPTII,S$GLB,COE, | Performed by: INTERNAL MEDICINE

## 2025-05-12 PROCEDURE — 99215 OFFICE O/P EST HI 40 MIN: CPT | Mod: S$GLB,COE,, | Performed by: INTERNAL MEDICINE

## 2025-05-12 PROCEDURE — 85025 COMPLETE CBC W/AUTO DIFF WBC: CPT

## 2025-05-12 PROCEDURE — 36415 COLL VENOUS BLD VENIPUNCTURE: CPT

## 2025-05-12 PROCEDURE — 1160F RVW MEDS BY RX/DR IN RCRD: CPT | Mod: CPTII,S$GLB,COE, | Performed by: INTERNAL MEDICINE

## 2025-05-12 PROCEDURE — G2211 COMPLEX E/M VISIT ADD ON: HCPCS | Mod: S$GLB,COE,, | Performed by: INTERNAL MEDICINE

## 2025-05-12 PROCEDURE — 3008F BODY MASS INDEX DOCD: CPT | Mod: CPTII,S$GLB,COE, | Performed by: INTERNAL MEDICINE

## 2025-05-12 PROCEDURE — 3074F SYST BP LT 130 MM HG: CPT | Mod: CPTII,S$GLB,COE, | Performed by: INTERNAL MEDICINE

## 2025-05-12 PROCEDURE — 1159F MED LIST DOCD IN RCRD: CPT | Mod: CPTII,S$GLB,COE, | Performed by: INTERNAL MEDICINE

## 2025-05-12 PROCEDURE — 80053 COMPREHEN METABOLIC PANEL: CPT

## 2025-05-12 PROCEDURE — 77336 RADIATION PHYSICS CONSULT: CPT | Performed by: RADIOLOGY

## 2025-05-12 PROCEDURE — 99999 PR PBB SHADOW E&M-EST. PATIENT-LVL V: CPT | Mod: PBBFAC,COE,, | Performed by: INTERNAL MEDICINE

## 2025-05-12 PROCEDURE — 1126F AMNT PAIN NOTED NONE PRSNT: CPT | Mod: CPTII,S$GLB,COE, | Performed by: INTERNAL MEDICINE

## 2025-05-12 PROCEDURE — 77386 HC IMRT, COMPLEX: CPT | Performed by: RADIOLOGY

## 2025-05-12 PROCEDURE — 3044F HG A1C LEVEL LT 7.0%: CPT | Mod: CPTII,S$GLB,COE, | Performed by: INTERNAL MEDICINE

## 2025-05-12 PROCEDURE — 3078F DIAST BP <80 MM HG: CPT | Mod: CPTII,S$GLB,COE, | Performed by: INTERNAL MEDICINE

## 2025-05-12 RX ORDER — PROCHLORPERAZINE EDISYLATE 5 MG/ML
5 INJECTION INTRAMUSCULAR; INTRAVENOUS ONCE AS NEEDED
Status: CANCELLED | OUTPATIENT
Start: 2025-05-13

## 2025-05-12 RX ORDER — DIPHENHYDRAMINE HYDROCHLORIDE 50 MG/ML
50 INJECTION, SOLUTION INTRAMUSCULAR; INTRAVENOUS ONCE AS NEEDED
Status: CANCELLED | OUTPATIENT
Start: 2025-05-13

## 2025-05-12 RX ORDER — EPINEPHRINE 0.3 MG/.3ML
0.3 INJECTION SUBCUTANEOUS ONCE AS NEEDED
Status: CANCELLED | OUTPATIENT
Start: 2025-05-13

## 2025-05-12 RX ORDER — SODIUM CHLORIDE 0.9 % (FLUSH) 0.9 %
10 SYRINGE (ML) INJECTION
Status: CANCELLED | OUTPATIENT
Start: 2025-05-13

## 2025-05-12 RX ORDER — HEPARIN 100 UNIT/ML
500 SYRINGE INTRAVENOUS
Status: CANCELLED | OUTPATIENT
Start: 2025-05-13

## 2025-05-13 ENCOUNTER — INFUSION (OUTPATIENT)
Dept: INFUSION THERAPY | Facility: HOSPITAL | Age: 70
End: 2025-05-13
Attending: INTERNAL MEDICINE
Payer: MEDICARE

## 2025-05-13 VITALS
OXYGEN SATURATION: 98 % | WEIGHT: 175.38 LBS | HEART RATE: 79 BPM | SYSTOLIC BLOOD PRESSURE: 132 MMHG | TEMPERATURE: 98 F | BODY MASS INDEX: 32.08 KG/M2 | DIASTOLIC BLOOD PRESSURE: 92 MMHG

## 2025-05-13 DIAGNOSIS — C53.9 MALIGNANT NEOPLASM OF CERVIX, UNSPECIFIED SITE: Primary | ICD-10-CM

## 2025-05-13 PROCEDURE — A4216 STERILE WATER/SALINE, 10 ML: HCPCS | Performed by: INTERNAL MEDICINE

## 2025-05-13 PROCEDURE — 96366 THER/PROPH/DIAG IV INF ADDON: CPT

## 2025-05-13 PROCEDURE — 96367 TX/PROPH/DG ADDL SEQ IV INF: CPT

## 2025-05-13 PROCEDURE — 96413 CHEMO IV INFUSION 1 HR: CPT

## 2025-05-13 PROCEDURE — 96375 TX/PRO/DX INJ NEW DRUG ADDON: CPT

## 2025-05-13 PROCEDURE — 63600175 PHARM REV CODE 636 W HCPCS: Mod: JZ,TB | Performed by: INTERNAL MEDICINE

## 2025-05-13 PROCEDURE — 25000003 PHARM REV CODE 250: Performed by: INTERNAL MEDICINE

## 2025-05-13 PROCEDURE — 77386 HC IMRT, COMPLEX: CPT | Performed by: RADIOLOGY

## 2025-05-13 RX ORDER — PROCHLORPERAZINE EDISYLATE 5 MG/ML
5 INJECTION INTRAMUSCULAR; INTRAVENOUS ONCE AS NEEDED
Status: DISCONTINUED | OUTPATIENT
Start: 2025-05-13 | End: 2025-05-13 | Stop reason: HOSPADM

## 2025-05-13 RX ORDER — DIPHENHYDRAMINE HYDROCHLORIDE 50 MG/ML
50 INJECTION, SOLUTION INTRAMUSCULAR; INTRAVENOUS ONCE AS NEEDED
Status: DISCONTINUED | OUTPATIENT
Start: 2025-05-13 | End: 2025-05-13 | Stop reason: HOSPADM

## 2025-05-13 RX ORDER — HEPARIN 100 UNIT/ML
500 SYRINGE INTRAVENOUS
Status: DISCONTINUED | OUTPATIENT
Start: 2025-05-13 | End: 2025-05-13 | Stop reason: HOSPADM

## 2025-05-13 RX ORDER — EPINEPHRINE 0.3 MG/.3ML
0.3 INJECTION SUBCUTANEOUS ONCE AS NEEDED
Status: DISCONTINUED | OUTPATIENT
Start: 2025-05-13 | End: 2025-05-13 | Stop reason: HOSPADM

## 2025-05-13 RX ORDER — SODIUM CHLORIDE 0.9 % (FLUSH) 0.9 %
10 SYRINGE (ML) INJECTION
Status: DISCONTINUED | OUTPATIENT
Start: 2025-05-13 | End: 2025-05-13 | Stop reason: HOSPADM

## 2025-05-13 RX ADMIN — SODIUM CHLORIDE 500 ML/HR: 9 INJECTION, SOLUTION INTRAVENOUS at 12:05

## 2025-05-13 RX ADMIN — DEXAMETHASONE SODIUM PHOSPHATE 0.25 MG: 4 INJECTION, SOLUTION INTRA-ARTICULAR; INTRALESIONAL; INTRAMUSCULAR; INTRAVENOUS; SOFT TISSUE at 11:05

## 2025-05-13 RX ADMIN — CISPLATIN 56 MG: 1 INJECTION, SOLUTION INTRAVENOUS at 11:05

## 2025-05-13 RX ADMIN — APREPITANT 130 MG: 130 INJECTION, EMULSION INTRAVENOUS at 10:05

## 2025-05-13 RX ADMIN — POTASSIUM CHLORIDE 500 ML/HR: 2 INJECTION, SOLUTION, CONCENTRATE INTRAVENOUS at 08:05

## 2025-05-13 RX ADMIN — Medication 10 ML: at 01:05

## 2025-05-13 RX ADMIN — HEPARIN 500 UNITS: 100 SYRINGE at 01:05

## 2025-05-13 RX ADMIN — SODIUM CHLORIDE: 9 INJECTION, SOLUTION INTRAVENOUS at 08:05

## 2025-05-13 NOTE — NURSING
"On arrival pt picc dsg not intact, dsg hanging well below picc insertion site, pt &  state dsg began to "fall" late yesterday and pt secured picc insertion site with bandaid from home. educated on home care and given tape to secure as needed and when to call.  Also educated on keeping dry.  pt verb unstg   "

## 2025-05-13 NOTE — PROGRESS NOTES
HEMATOLOGY/ONCOLOGY OFFICE CLINIC VISIT    Visit Information:  Visit type:    Follow up - Oncology  Accompanied by:     Source of history:  Self  History limitation:  None      Initial Evaluation:  04/16/2025  Referring Provider:  Dr. Jeremy Browne  Other providers: Dr. 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 appears to cause obstruction with distension of the endometrial cavity with fluid. There was distention of the endometrial cavity with fluid.  There was no free pelvic fluid.  Uterus was 11 x 5 x 5 cm.   MRI pelvis 4/24/2025:A 5 cm in greatest dimension cervical cancer with invasion of the lower third of the uterus and upper third of the vagina. Bilateral parametrial invasion. No evidence of pelvic sidewall invasion. Right external iliac lymphadenopathy. The mass encroaches upon but does not clearly invade the posterior wall of the bladder. The mass involves the anterior wall of the upper third of the vagina. The tumor extends within 1 cm of the right pelvic sidewall, but does not encroach upon the left pelvic sidewall. The mass invades the lower third of the uterus.   PET CT 4/24/2025: A 5 x 4 cm hypermetabolic cervical mass (Max SUV 12.3). There is a 1.2 cm right external iliac lymph node with elevated uptake (Max SUV 5.3). No other suspicious activity in the abdomen or pelvis.     Pathology:  3/19/2025:  cervical biopsy:  MODERATELY DIFFERENTIATED SQUAMOUS CELL CARCINOMA, ARISING IN A BACKGROUND OF EXTENSIVE  SEVERE DYSPLASIA.    THE TUMOR IS PARTIALLY NECROTIC.     IMMUNOREACTION RESULTS:   P63: EXHIBITS NUCLEAR POSITIVITY IN NEOPLASTIC CELLS.   P16: EXHIBITS BLOCK POSITIVITY IN NEOPLASTIC CELLS.   CONTROL REACTIONS ARE APPROPRIATE.     Comment: Cervical squamous cell carcinoma is favored. No endometrium is identified.     CLINICAL HISTORY:       Patient: Lora Goins is a 69 y.o. female. with history of kindly referred for   cervical cancer.    Patient initially presented with watery vaginal discharge in March of this year that eventually progress to blood tinged discharge.  She did not have any other symptoms associated with it.  Patient did have a pelvic ultrasound on 03/14/2025 that showed and hypoechoic mass in the cervix measuring 5.3 x 4 x 4 cm.  There was distention of the endometrial cavity with fluid.  There was no free pelvic fluid.  Uterus was 11 x 5 x 5 cm.      She underwent cervical biopsy on 03/19/2025 that showed moderately differentiated squamous cell carcinoma arising in the background of extensive severe dysplasia with areas of necrosis.  Of note she has history of cervical dysplasia for which she received cryotherapy over 20 years ago.     She was seen by Dr. Jeremy Browne on 04/04/2025 because of the size of the tumor she is not a surgical candidate.    She is here today with her .  She is doing relatively well and voices no concerns.  No family history of breast, ovarian cancer    Chief Complaint: 1 Week Follow Up (Patient states she has been drained no energy since Friday. )      Interval History:    05/19/25: Patient presents today for 1 week follow up for her cervical cancer. She is due for Cisplatin C3, on 05/20/25 okay to treat. Patient states has been feeling drained since Friday 5/16/25, advised to just keep moving as tolerated. Still having swelling in her legs, but this has improved significantly as she elevates her legs.  Instructed to  continue to elevate. Pt denies fever,  chills, or sweats. No chest pain or SOB. Labs reviewed and stable for pt    Past Medical History:   Diagnosis Date    Arthritis     Charcot Kayla Tooth muscular atrophy     Fibromyalgia     GERD (gastroesophageal reflux disease)     Hyperlipidemia     Hypertension     Hypertension     Restless leg syndrome       Past Surgical History:   Procedure Laterality Date     SECTION      2     SECTION      FOOT SURGERY      JOINT REPLACEMENT      KNEE ARTHROPLASTY Right 2020    Procedure: ARTHROPLASTY, KNEE - [employer];  Surgeon: Chapito Elkins MD;  Location: [facility] OR;  Service: Orthopedics;  Laterality: Right;    LITHOTRIPSY      around      PERIPHERALLY INSERTED CENTRAL CATHETER INSERTION N/A 2025    Procedure: Insertion-picc;  Surgeon: Nicole Adair MD;  Location: [facility] OR;  Service: Oncology;  Laterality: N/A;    REMOVAL OF PLANTAR WART USING LASER      SINUS SURGERY          TONSILLECTOMY      TOTAL KNEE ARTHROPLASTY Left 2019    Procedure: ARTHROPLASTY, KNEE, TOTAL-[employer]ELSY;  Surgeon: Chapito Elkins MD;  Location: [address];  Service: Orthopedics;  Laterality: Left;     Family History   Problem Relation Name Age of Onset    Cancer Mother          brain tumor , hip, blood stream, lungs    Cancer Father      Cancer Sister          lung tumor          Review of patient's allergies indicates:   Allergen Reactions    Ezetimibe-simvastatin      Cramps  Can take Ezetimibe    Lovastatin      cramps    Pitavastatin      cramps    Pravastatin      Muscle cramps      Medications Ordered Prior to Encounter[1]   Review of Systems   Constitutional:  Negative for activity change, appetite change, chills, fatigue, fever, night sweats and unexpected weight change.   HENT:  Negative for mouth dryness, mouth sores, nosebleeds, sore throat and trouble swallowing.    Eyes: Negative.  Negative for visual disturbance.   Respiratory:  Negative for cough and shortness of breath.   "  Cardiovascular:  Positive for leg swelling (Much better). Negative for chest pain and palpitations.   Gastrointestinal:  Negative for abdominal distention, abdominal pain, blood in stool, change in bowel habit, constipation, diarrhea, nausea and vomiting.   Endocrine: Negative.    Genitourinary:  Negative for dysuria, frequency, hematuria and urgency.   Musculoskeletal:  Negative for arthralgias, back pain, myalgias and neck pain.   Integumentary:  Negative for rash.   Neurological:  Negative for dizziness, tremors, syncope, speech difficulty, weakness, light-headedness, numbness, headaches and memory loss.   Hematological:  Negative for adenopathy. Does not bruise/bleed easily.   Psychiatric/Behavioral:  Negative for confusion and suicidal ideas. The patient is not nervous/anxious.               Vitals:    05/19/25 0920   BP: 126/82   BP Location: Left arm   Patient Position: Sitting   Pulse: 93   Resp: 18   Temp: 98.3 °F (36.8 °C)   TempSrc: Oral   SpO2: 96%   Weight: 78.3 kg (172 lb 11.2 oz)   Height: 5' 2" (1.575 m)      Wt Readings from Last 6 Encounters:   05/19/25 78.3 kg (172 lb 11.2 oz)   05/13/25 79.6 kg (175 lb 6.4 oz)   05/12/25 80.6 kg (177 lb 9.6 oz)   05/06/25 78.2 kg (172 lb 4.8 oz)   05/05/25 78.2 kg (172 lb 4.8 oz)   04/28/25 78.5 kg (173 lb)     Body mass index is 31.59 kg/m².  Body surface area is 1.85 meters squared.  Physical Exam  Vitals and nursing note reviewed.   Constitutional:       General: She is not in acute distress.     Appearance: Normal appearance. She is well-developed.   HENT:      Head: Normocephalic and atraumatic.      Mouth/Throat:      Mouth: Mucous membranes are moist.   Eyes:      General: No scleral icterus.     Extraocular Movements: Extraocular movements intact.      Conjunctiva/sclera: Conjunctivae normal.      Pupils: Pupils are equal, round, and reactive to light.   Neck:      Vascular: No JVD.   Cardiovascular:      Rate and Rhythm: Normal rate and regular rhythm. "      Heart sounds: No murmur heard.  Pulmonary:      Effort: Pulmonary effort is normal.      Breath sounds: Normal breath sounds. No wheezing or rhonchi.   Abdominal:      General: Bowel sounds are normal. There is no distension.      Palpations: Abdomen is soft. There is no mass.      Tenderness: There is no abdominal tenderness.   Musculoskeletal:         General: No swelling or deformity.      Cervical back: Neck supple.      Right lower leg: Swelling present.      Left lower leg: Swelling present.      Comments: PICC line in place   Trace edema of the lower extremities bilaterally   Lymphadenopathy:      Head:      Right side of head: No submental or submandibular adenopathy.      Left side of head: No submental or submandibular adenopathy.      Cervical: No cervical adenopathy.      Lower Body: No right inguinal adenopathy. No left inguinal adenopathy.   Skin:     General: Skin is warm.      Coloration: Skin is not jaundiced.      Findings: No lesion or rash.      Nails: There is no clubbing.   Neurological:      General: No focal deficit present.      Mental Status: She is alert and oriented to person, place, and time.      Cranial Nerves: Cranial nerves 2-12 are intact.   Psychiatric:         Attention and Perception: Attention normal.         Behavior: Behavior is cooperative.         Judgment: Judgment normal.     ECOG SCORE    1 - Restricted in strenuous activity-ambulatory and able to carry out work of a light nature         Laboratory:  CBC with Differential:  Lab Results   Component Value Date    WBC 5.72 05/12/2025    RBC 4.67 05/12/2025    HGB 13.0 05/12/2025    HCT 40.1 05/12/2025    MCV 85.9 05/12/2025    MCH 27.8 05/12/2025    MCHC 32.4 (L) 05/12/2025    RDW 13.7 05/12/2025     05/12/2025    MPV 9.7 05/12/2025    GRAN 3.2 07/27/2020    GRAN 52.6 07/27/2020    LYMPH 2.3 07/27/2020    LYMPH 37.3 07/27/2020    MONO 0.4 07/27/2020    MONO 6.5 07/27/2020    EOS 0.2 11/23/2022    BASO 0.0  11/23/2022    EOSINOPHIL 4 11/23/2022    BASOPHIL 0.8 07/27/2020        CMP:  Sodium   Date Value Ref Range Status   05/12/2025 140 136 - 145 mmol/L Final   07/27/2020 142 136 - 145 mmol/L Final     Potassium   Date Value Ref Range Status   05/12/2025 3.9 3.5 - 5.1 mmol/L Final   07/27/2020 3.9 3.5 - 5.1 mmol/L Final     Chloride   Date Value Ref Range Status   05/12/2025 104 98 - 107 mmol/L Final   07/27/2020 103 95 - 110 mmol/L Final     CO2   Date Value Ref Range Status   05/12/2025 27 23 - 31 mmol/L Final   07/27/2020 30 (H) 23 - 29 mmol/L Final     Glucose   Date Value Ref Range Status   05/12/2025 95 82 - 115 mg/dL Final   07/27/2020 161 (H) 70 - 110 mg/dL Final     BUN   Date Value Ref Range Status   07/27/2020 19 8 - 23 mg/dL Final     Blood Urea Nitrogen   Date Value Ref Range Status   05/12/2025 31.6 (H) 9.8 - 20.1 mg/dL Final     Creatinine   Date Value Ref Range Status   05/12/2025 0.85 0.55 - 1.02 mg/dL Final   07/27/2020 1.0 0.5 - 1.4 mg/dL Final     Calcium   Date Value Ref Range Status   05/12/2025 9.0 8.4 - 10.2 mg/dL Final   07/27/2020 9.4 8.7 - 10.5 mg/dL Final     Protein Total   Date Value Ref Range Status   05/12/2025 7.3 5.8 - 7.6 gm/dL Final     Albumin   Date Value Ref Range Status   05/12/2025 3.4 3.4 - 4.8 g/dL Final     Bilirubin Total   Date Value Ref Range Status   05/12/2025 0.9 <=1.5 mg/dL Final     ALP   Date Value Ref Range Status   05/12/2025 80 40 - 150 unit/L Final     AST   Date Value Ref Range Status   05/12/2025 16 11 - 45 unit/L Final     ALT   Date Value Ref Range Status   05/12/2025 19 0 - 55 unit/L Final     Anion Gap   Date Value Ref Range Status   07/27/2020 9 8 - 16 mmol/L Final     eGFR if    Date Value Ref Range Status   07/27/2020 >60.0 >60 mL/min/1.73 m^2 Final     eGFR if non    Date Value Ref Range Status   07/27/2020 59.7 (A) >60 mL/min/1.73 m^2 Final     Comment:     Calculation used to obtain the estimated glomerular  filtration  rate (eGFR) is the CKD-EPI equation.                Assessment:       1. Malignant neoplasm of cervix, unspecified site    2. 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 Malignant neoplasm of endocervix    5. Chemotherapy-induced fatigue        1) Cervical cancer  --on weekly cisplatin + XRT  --Immunodeficency due to chemotherapy    2) chemotherapy-related problems:  --chemotherapy-induced fatigue  --immunodeficiency secondary to chemotherapy        Plan:         Cisplatin Q weekly while on XRT  Ok to treat on 5/20/25  RTC in 1 week with NP/same day labs/infusion next day   Labs weekly: CBC, CMP    The patient was seen, interviewed and examined. Pertinent lab and radiology studies were reviewed.   The patient was given ample opportunity to ask questions, and to the best of my abilities, all questions answered to satisfaction; patient demonstrated understanding of what we discussed and agreeable to the plan. Pt instructed to call should develop concerning signs/symptoms or have further questions.     Visit today included increased complexity associated with the care of the episodic problem treatment clearance, addressing and managing the longitudinal care of the patient's cervical cancer.     Nicole Adair MD  Hematology/Oncology    Professional Services   I, Edda Haywood LPN, acted solely as a scribe for and in the presence of Dr. Nicole Adair, who performed these services.                   [1]   Current Outpatient Medications on File Prior to Visit   Medication Sig Dispense Refill    amLODIPine (NORVASC) 5 MG tablet Take 5 mg by mouth.      celecoxib (CELEBREX) 200 MG capsule Take 200 mg by mouth.      dexAMETHasone (DECADRON) 4 MG Tab Take 2 tablets (8 mg total) by mouth once daily. With food on days 2-4 of each chemotherapy cycle. 6 tablet 5    DULoxetine (CYMBALTA) 30 MG capsule Take 1 capsule by mouth once daily.      esomeprazole (NEXIUM) 40  MG capsule Take 1 capsule by mouth every morning.      ezetimibe (ZETIA) 10 mg tablet Take 10 mg by mouth every evening. Takes at night      gabapentin (NEURONTIN) 300 MG capsule Take 300 mg by mouth 2 (two) times daily. Take if needed      hydroCHLOROthiazide (HYDRODIURIL) 25 MG tablet Take 25 mg by mouth.      OLANZapine (ZYPREXA) 5 MG tablet Take 1 tablet by mouth nightly on days 1-4 of each chemotherapy cycle. 4 tablet 5    pramipexole (MIRAPEX) 0.5 MG tablet Take 0.5 mg by mouth.      prochlorperazine (COMPAZINE) 5 MG tablet Take 1 tablet (5 mg total) by mouth every 6 (six) hours as needed for Nausea. 20 tablet 5    rOPINIRole (REQUIP) 4 MG tablet Takes at night      traMADol (ULTRAM) 50 mg tablet 2 (two) times daily as needed. Pain    Take if needed      [DISCONTINUED] amLODIPine (NORVASC) 5 MG tablet Take 5 mg by mouth once daily. Take in am of surgery (Patient not taking: Reported on 5/12/2025)      [DISCONTINUED] celecoxib (CELEBREX) 200 MG capsule Take 200 mg by mouth 2 (two) times daily. Hold for 1 week prior to surgery (Patient not taking: Reported on 5/12/2025)      [DISCONTINUED] DULoxetine (CYMBALTA) 30 MG capsule Take 30 mg by mouth once daily. Take in am of surgery (Patient not taking: Reported on 5/12/2025)      [DISCONTINUED] esomeprazole (NEXIUM) 40 MG capsule Take 40 mg by mouth once daily. Take in am of surgery (Patient not taking: Reported on 5/12/2025)      [DISCONTINUED] ezetimibe (ZETIA) 10 mg tablet Take 10 mg by mouth. (Patient not taking: Reported on 5/12/2025)      [DISCONTINUED] hydroCHLOROthiazide (HYDRODIURIL) 25 MG tablet Take 25 mg by mouth once daily. Hold am of surgery (Patient not taking: Reported on 5/12/2025)      [DISCONTINUED] traMADoL (ULTRAM) 50 mg tablet Take 100 mg by mouth. (Patient not taking: Reported on 5/12/2025)       No current facility-administered medications on file prior to visit.

## 2025-05-13 NOTE — PLAN OF CARE
Pt received c2 d1 cisplatin, tolerated well.  Next appts given.    See nurse note for details on picc appearance on pt arrival this am.  Reinforced picc home care instructions prior to dc home today.  Pt/ verb unstg.

## 2025-05-14 PROCEDURE — 77386 HC IMRT, COMPLEX: CPT | Performed by: RADIOLOGY

## 2025-05-15 PROCEDURE — 77386 HC IMRT, COMPLEX: CPT | Performed by: RADIOLOGY

## 2025-05-16 PROCEDURE — 77386 HC IMRT, COMPLEX: CPT

## 2025-05-19 ENCOUNTER — LAB VISIT (OUTPATIENT)
Dept: LAB | Facility: HOSPITAL | Age: 70
End: 2025-05-19
Attending: INTERNAL MEDICINE
Payer: MEDICARE

## 2025-05-19 ENCOUNTER — OFFICE VISIT (OUTPATIENT)
Dept: HEMATOLOGY/ONCOLOGY | Facility: CLINIC | Age: 70
End: 2025-05-19
Payer: MEDICARE

## 2025-05-19 VITALS
OXYGEN SATURATION: 96 % | HEART RATE: 93 BPM | SYSTOLIC BLOOD PRESSURE: 126 MMHG | WEIGHT: 172.69 LBS | HEIGHT: 62 IN | TEMPERATURE: 98 F | BODY MASS INDEX: 31.78 KG/M2 | RESPIRATION RATE: 18 BRPM | DIASTOLIC BLOOD PRESSURE: 82 MMHG

## 2025-05-19 DIAGNOSIS — Z79.69 ON ANTINEOPLASTIC CHEMOTHERAPY: ICD-10-CM

## 2025-05-19 DIAGNOSIS — T45.1X5A CHEMOTHERAPY-INDUCED FATIGUE: ICD-10-CM

## 2025-05-19 DIAGNOSIS — C53.0 MALIGNANT NEOPLASM OF ENDOCERVIX: ICD-10-CM

## 2025-05-19 DIAGNOSIS — R53.83 CHEMOTHERAPY-INDUCED FATIGUE: ICD-10-CM

## 2025-05-19 DIAGNOSIS — Z79.69 IMMUNODEFICIENCY SECONDARY TO CHEMOTHERAPY: ICD-10-CM

## 2025-05-19 DIAGNOSIS — C53.9 MALIGNANT NEOPLASM OF CERVIX, UNSPECIFIED SITE: Primary | ICD-10-CM

## 2025-05-19 DIAGNOSIS — T45.1X5A IMMUNODEFICIENCY SECONDARY TO CHEMOTHERAPY: ICD-10-CM

## 2025-05-19 DIAGNOSIS — C53.9 MALIGNANT NEOPLASM OF CERVIX, UNSPECIFIED SITE: ICD-10-CM

## 2025-05-19 DIAGNOSIS — D84.821 IMMUNODEFICIENCY SECONDARY TO CHEMOTHERAPY: ICD-10-CM

## 2025-05-19 LAB
ALBUMIN SERPL-MCNC: 3.2 G/DL (ref 3.4–4.8)
ALBUMIN/GLOB SERPL: 0.8 RATIO (ref 1.1–2)
ALP SERPL-CCNC: 67 UNIT/L (ref 40–150)
ALT SERPL-CCNC: 17 UNIT/L (ref 0–55)
ANION GAP SERPL CALC-SCNC: 11 MEQ/L
AST SERPL-CCNC: 14 UNIT/L (ref 11–45)
BASOPHILS # BLD AUTO: 0 X10(3)/MCL
BASOPHILS NFR BLD AUTO: 0 %
BILIRUB SERPL-MCNC: 0.6 MG/DL
BUN SERPL-MCNC: 26.9 MG/DL (ref 9.8–20.1)
CALCIUM SERPL-MCNC: 9.2 MG/DL (ref 8.4–10.2)
CHLORIDE SERPL-SCNC: 104 MMOL/L (ref 98–107)
CO2 SERPL-SCNC: 26 MMOL/L (ref 23–31)
CREAT SERPL-MCNC: 0.85 MG/DL (ref 0.55–1.02)
CREAT/UREA NIT SERPL: 32
EOSINOPHIL # BLD AUTO: 0.23 X10(3)/MCL (ref 0–0.9)
EOSINOPHIL NFR BLD AUTO: 4.2 %
ERYTHROCYTE [DISTWIDTH] IN BLOOD BY AUTOMATED COUNT: 13.8 % (ref 11.5–17)
GFR SERPLBLD CREATININE-BSD FMLA CKD-EPI: >60 ML/MIN/1.73/M2
GLOBULIN SER-MCNC: 3.9 GM/DL (ref 2.4–3.5)
GLUCOSE SERPL-MCNC: 128 MG/DL (ref 82–115)
HCT VFR BLD AUTO: 40.5 % (ref 37–47)
HGB BLD-MCNC: 13.4 G/DL (ref 12–16)
IMM GRANULOCYTES # BLD AUTO: 0.07 X10(3)/MCL (ref 0–0.04)
IMM GRANULOCYTES NFR BLD AUTO: 1.3 %
LYMPHOCYTES # BLD AUTO: 0.47 X10(3)/MCL (ref 0.6–4.6)
LYMPHOCYTES NFR BLD AUTO: 8.6 %
MCH RBC QN AUTO: 28.3 PG (ref 27–31)
MCHC RBC AUTO-ENTMCNC: 33.1 G/DL (ref 33–36)
MCV RBC AUTO: 85.4 FL (ref 80–94)
MONOCYTES # BLD AUTO: 0.48 X10(3)/MCL (ref 0.1–1.3)
MONOCYTES NFR BLD AUTO: 8.7 %
NEUTROPHILS # BLD AUTO: 4.24 X10(3)/MCL (ref 2.1–9.2)
NEUTROPHILS NFR BLD AUTO: 77.2 %
PLATELET # BLD AUTO: 100 X10(3)/MCL (ref 130–400)
PMV BLD AUTO: 9.7 FL (ref 7.4–10.4)
POTASSIUM SERPL-SCNC: 3.9 MMOL/L (ref 3.5–5.1)
PROT SERPL-MCNC: 7.1 GM/DL (ref 5.8–7.6)
RBC # BLD AUTO: 4.74 X10(6)/MCL (ref 4.2–5.4)
SODIUM SERPL-SCNC: 141 MMOL/L (ref 136–145)
WBC # BLD AUTO: 5.49 X10(3)/MCL (ref 4.5–11.5)

## 2025-05-19 PROCEDURE — G2211 COMPLEX E/M VISIT ADD ON: HCPCS | Mod: S$GLB,COE,, | Performed by: INTERNAL MEDICINE

## 2025-05-19 PROCEDURE — 99999 PR PBB SHADOW E&M-EST. PATIENT-LVL IV: CPT | Mod: PBBFAC,COE,, | Performed by: INTERNAL MEDICINE

## 2025-05-19 PROCEDURE — 3044F HG A1C LEVEL LT 7.0%: CPT | Mod: CPTII,S$GLB,COE, | Performed by: INTERNAL MEDICINE

## 2025-05-19 PROCEDURE — 80053 COMPREHEN METABOLIC PANEL: CPT

## 2025-05-19 PROCEDURE — 85025 COMPLETE CBC W/AUTO DIFF WBC: CPT

## 2025-05-19 PROCEDURE — 1160F RVW MEDS BY RX/DR IN RCRD: CPT | Mod: CPTII,S$GLB,COE, | Performed by: INTERNAL MEDICINE

## 2025-05-19 PROCEDURE — 1101F PT FALLS ASSESS-DOCD LE1/YR: CPT | Mod: CPTII,S$GLB,COE, | Performed by: INTERNAL MEDICINE

## 2025-05-19 PROCEDURE — 1159F MED LIST DOCD IN RCRD: CPT | Mod: CPTII,S$GLB,COE, | Performed by: INTERNAL MEDICINE

## 2025-05-19 PROCEDURE — 77336 RADIATION PHYSICS CONSULT: CPT | Performed by: RADIOLOGY

## 2025-05-19 PROCEDURE — 3288F FALL RISK ASSESSMENT DOCD: CPT | Mod: CPTII,S$GLB,COE, | Performed by: INTERNAL MEDICINE

## 2025-05-19 PROCEDURE — 77386 HC IMRT, COMPLEX: CPT | Performed by: RADIOLOGY

## 2025-05-19 PROCEDURE — 3074F SYST BP LT 130 MM HG: CPT | Mod: CPTII,S$GLB,COE, | Performed by: INTERNAL MEDICINE

## 2025-05-19 PROCEDURE — 1126F AMNT PAIN NOTED NONE PRSNT: CPT | Mod: CPTII,S$GLB,COE, | Performed by: INTERNAL MEDICINE

## 2025-05-19 PROCEDURE — 3079F DIAST BP 80-89 MM HG: CPT | Mod: CPTII,S$GLB,COE, | Performed by: INTERNAL MEDICINE

## 2025-05-19 PROCEDURE — 99215 OFFICE O/P EST HI 40 MIN: CPT | Mod: S$GLB,COE,, | Performed by: INTERNAL MEDICINE

## 2025-05-19 PROCEDURE — 3008F BODY MASS INDEX DOCD: CPT | Mod: CPTII,S$GLB,COE, | Performed by: INTERNAL MEDICINE

## 2025-05-19 PROCEDURE — 36415 COLL VENOUS BLD VENIPUNCTURE: CPT

## 2025-05-19 RX ORDER — HEPARIN 100 UNIT/ML
500 SYRINGE INTRAVENOUS
Status: CANCELLED | OUTPATIENT
Start: 2025-05-20

## 2025-05-19 RX ORDER — DIPHENHYDRAMINE HYDROCHLORIDE 50 MG/ML
50 INJECTION, SOLUTION INTRAMUSCULAR; INTRAVENOUS ONCE AS NEEDED
Status: CANCELLED | OUTPATIENT
Start: 2025-05-20

## 2025-05-19 RX ORDER — PROCHLORPERAZINE EDISYLATE 5 MG/ML
5 INJECTION INTRAMUSCULAR; INTRAVENOUS ONCE AS NEEDED
Status: CANCELLED | OUTPATIENT
Start: 2025-05-20

## 2025-05-19 RX ORDER — EPINEPHRINE 0.3 MG/.3ML
0.3 INJECTION SUBCUTANEOUS ONCE AS NEEDED
Status: CANCELLED | OUTPATIENT
Start: 2025-05-20

## 2025-05-19 RX ORDER — SODIUM CHLORIDE 0.9 % (FLUSH) 0.9 %
10 SYRINGE (ML) INJECTION
Status: CANCELLED | OUTPATIENT
Start: 2025-05-20

## 2025-05-20 ENCOUNTER — INFUSION (OUTPATIENT)
Dept: INFUSION THERAPY | Facility: HOSPITAL | Age: 70
End: 2025-05-20
Attending: INTERNAL MEDICINE
Payer: MEDICARE

## 2025-05-20 VITALS
SYSTOLIC BLOOD PRESSURE: 135 MMHG | TEMPERATURE: 98 F | HEART RATE: 87 BPM | WEIGHT: 172.63 LBS | HEIGHT: 62 IN | RESPIRATION RATE: 18 BRPM | OXYGEN SATURATION: 98 % | DIASTOLIC BLOOD PRESSURE: 84 MMHG | BODY MASS INDEX: 31.77 KG/M2

## 2025-05-20 DIAGNOSIS — C53.9 MALIGNANT NEOPLASM OF CERVIX, UNSPECIFIED SITE: Primary | ICD-10-CM

## 2025-05-20 PROCEDURE — 96413 CHEMO IV INFUSION 1 HR: CPT

## 2025-05-20 PROCEDURE — 63600175 PHARM REV CODE 636 W HCPCS: Mod: JZ,TB | Performed by: INTERNAL MEDICINE

## 2025-05-20 PROCEDURE — A4216 STERILE WATER/SALINE, 10 ML: HCPCS | Performed by: INTERNAL MEDICINE

## 2025-05-20 PROCEDURE — 96375 TX/PRO/DX INJ NEW DRUG ADDON: CPT

## 2025-05-20 PROCEDURE — 96366 THER/PROPH/DIAG IV INF ADDON: CPT

## 2025-05-20 PROCEDURE — 96367 TX/PROPH/DG ADDL SEQ IV INF: CPT

## 2025-05-20 PROCEDURE — 25000003 PHARM REV CODE 250: Performed by: INTERNAL MEDICINE

## 2025-05-20 RX ORDER — DIPHENHYDRAMINE HYDROCHLORIDE 50 MG/ML
50 INJECTION, SOLUTION INTRAMUSCULAR; INTRAVENOUS ONCE AS NEEDED
Status: DISCONTINUED | OUTPATIENT
Start: 2025-05-20 | End: 2025-05-20 | Stop reason: HOSPADM

## 2025-05-20 RX ORDER — EPINEPHRINE 0.3 MG/.3ML
0.3 INJECTION SUBCUTANEOUS ONCE AS NEEDED
Status: DISCONTINUED | OUTPATIENT
Start: 2025-05-20 | End: 2025-05-20 | Stop reason: HOSPADM

## 2025-05-20 RX ORDER — SODIUM CHLORIDE 0.9 % (FLUSH) 0.9 %
10 SYRINGE (ML) INJECTION
Status: DISCONTINUED | OUTPATIENT
Start: 2025-05-20 | End: 2025-05-20 | Stop reason: HOSPADM

## 2025-05-20 RX ORDER — PROCHLORPERAZINE EDISYLATE 5 MG/ML
5 INJECTION INTRAMUSCULAR; INTRAVENOUS ONCE AS NEEDED
Status: DISCONTINUED | OUTPATIENT
Start: 2025-05-20 | End: 2025-05-20 | Stop reason: HOSPADM

## 2025-05-20 RX ORDER — HEPARIN 100 UNIT/ML
500 SYRINGE INTRAVENOUS
Status: DISCONTINUED | OUTPATIENT
Start: 2025-05-20 | End: 2025-05-20 | Stop reason: HOSPADM

## 2025-05-20 RX ADMIN — APREPITANT 130 MG: 130 INJECTION, EMULSION INTRAVENOUS at 10:05

## 2025-05-20 RX ADMIN — Medication 10 ML: at 08:05

## 2025-05-20 RX ADMIN — SODIUM CHLORIDE 500 ML/HR: 9 INJECTION, SOLUTION INTRAVENOUS at 11:05

## 2025-05-20 RX ADMIN — POTASSIUM CHLORIDE 500 ML/HR: 2 INJECTION, SOLUTION, CONCENTRATE INTRAVENOUS at 08:05

## 2025-05-20 RX ADMIN — SODIUM CHLORIDE: 9 INJECTION, SOLUTION INTRAVENOUS at 08:05

## 2025-05-20 RX ADMIN — DEXAMETHASONE SODIUM PHOSPHATE 0.25 MG: 4 INJECTION, SOLUTION INTRA-ARTICULAR; INTRALESIONAL; INTRAMUSCULAR; INTRAVENOUS; SOFT TISSUE at 10:05

## 2025-05-20 RX ADMIN — Medication 10 ML: at 12:05

## 2025-05-20 RX ADMIN — HEPARIN 500 UNITS: 100 SYRINGE at 12:05

## 2025-05-20 RX ADMIN — CISPLATIN 56 MG: 1 INJECTION, SOLUTION INTRAVENOUS at 11:05

## 2025-05-20 NOTE — NURSING
C3 Cisplatin given, tolerated well. PICC dressing changed via sterile technique. Pt discharged home in stable condition, aware of future appts.

## 2025-05-20 NOTE — PROGRESS NOTES
HEMATOLOGY/ONCOLOGY OFFICE CLINIC VISIT    Visit Information:  Visit type:    Follow up - Oncology  Accompanied by:     Source of history:  Self  History limitation:  None      Initial Evaluation:  04/16/2025  Referring Provider:  Dr. Jeremy Browne  Other providers: Dr. 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 appears to cause obstruction with distension of the endometrial cavity with fluid. There was distention of the endometrial cavity with fluid.  There was no free pelvic fluid.  Uterus was 11 x 5 x 5 cm.   MRI pelvis 4/24/2025:A 5 cm in greatest dimension cervical cancer with invasion of the lower third of the uterus and upper third of the vagina. Bilateral parametrial invasion. No evidence of pelvic sidewall invasion. Right external iliac lymphadenopathy. The mass encroaches upon but does not clearly invade the posterior wall of the bladder. The mass involves the anterior wall of the upper third of the vagina. The tumor extends within 1 cm of the right pelvic sidewall, but does not encroach upon the left pelvic sidewall. The mass invades the lower third of the uterus.   PET CT 4/24/2025: A 5 x 4 cm hypermetabolic cervical mass (Max SUV 12.3). There is a 1.2 cm right external iliac lymph node with elevated uptake (Max SUV 5.3). No other suspicious activity in the abdomen or pelvis.     Pathology:  3/19/2025:  cervical biopsy:  MODERATELY DIFFERENTIATED SQUAMOUS CELL CARCINOMA, ARISING IN A BACKGROUND OF EXTENSIVE  SEVERE DYSPLASIA.    THE TUMOR IS PARTIALLY NECROTIC.     IMMUNOREACTION RESULTS:   P63: EXHIBITS NUCLEAR POSITIVITY IN NEOPLASTIC CELLS.   P16: EXHIBITS BLOCK POSITIVITY IN NEOPLASTIC CELLS.   CONTROL REACTIONS ARE APPROPRIATE.     Comment: Cervical squamous cell carcinoma is favored. No endometrium is identified.     CLINICAL HISTORY:       Patient: Lora Goins is a 69 y.o. female. with history of kindly referred for   cervical cancer.    Patient initially presented with watery vaginal discharge in March of this year that eventually progress to blood tinged discharge.  She did not have any other symptoms associated with it.  Patient did have a pelvic ultrasound on 03/14/2025 that showed and hypoechoic mass in the cervix measuring 5.3 x 4 x 4 cm.  There was distention of the endometrial cavity with fluid.  There was no free pelvic fluid.  Uterus was 11 x 5 x 5 cm.      She underwent cervical biopsy on 03/19/2025 that showed moderately differentiated squamous cell carcinoma arising in the background of extensive severe dysplasia with areas of necrosis.  Of note she has history of cervical dysplasia for which she received cryotherapy over 20 years ago.     She was seen by Dr. Jeremy Browne on 04/04/2025 because of the size of the tumor she is not a surgical candidate.    She is here today with her .  She is doing relatively well and voices no concerns.  No family history of breast, ovarian cancer    Chief Complaint: 1 Week Follow Up (Patient states she is bleeding more then normal.)      Interval History:    05/19/25: Patient presents today for 1 week follow up for her cervical cancer. She is due for Cisplatin C3, on 05/20/25 okay to treat. Patient states has been feeling drained since Friday 5/16/25, advised to just keep moving as tolerated. Still having swelling in her legs, but this has improved significantly as she elevates her legs.  Instructed to  continue to elevate. Pt denies fever, chills, or  sweats. No chest pain or SOB. Labs reviewed and stable for pt    25: Patient presents today for 1 week follow up for her cervical cancer. She is due for Cisplatin C4, on 25 okay to treat. Patient reports having more than normal bloody vaginal discharge. Pt denies fever, chills, or sweats.  No chest pain or SOB. Labs reviewed and stable for pt    Past Medical History:   Diagnosis Date    Arthritis     Charcot Kayla Tooth muscular atrophy     Fibromyalgia     GERD (gastroesophageal reflux disease)     Hyperlipidemia     Hypertension     Hypertension     Restless leg syndrome       Past Surgical History:   Procedure Laterality Date     SECTION      2     SECTION      FOOT SURGERY      JOINT REPLACEMENT      KNEE ARTHROPLASTY Right 2020    Procedure: ARTHROPLASTY, KNEE - [employer];  Surgeon: Chapito Elkins MD;  Location: [facility] OR;  Service: Orthopedics;  Laterality: Right;    LITHOTRIPSY      around      PERIPHERALLY INSERTED CENTRAL CATHETER INSERTION N/A 2025    Procedure: Insertion-picc;  Surgeon: Nicole Adair MD;  Location: [facility] OR;  Service: Oncology;  Laterality: N/A;    REMOVAL OF PLANTAR WART USING LASER      SINUS SURGERY          TONSILLECTOMY      TOTAL KNEE ARTHROPLASTY Left 2019    Procedure: ARTHROPLASTY, KNEE, TOTAL-[employer]SY;  Surgeon: Chapito Elkins MD;  Location: [address];  Service: Orthopedics;  Laterality: Left;     Family History   Problem Relation Name Age of Onset    Cancer Mother          brain tumor , hip, blood stream, lungs    Cancer Father      Cancer Sister          lung tumor          Review of patient's allergies indicates:   Allergen Reactions    Ezetimibe-simvastatin      Cramps  Can take Ezetimibe    Lovastatin      cramps    Pitavastatin      cramps    Pravastatin      Muscle cramps      Medications Ordered Prior to Encounter[1]   Review of Systems   Constitutional:  Positive for fatigue. Negative for  "activity change, appetite change, chills, fever, night sweats and unexpected weight change.   HENT:  Negative for mouth dryness, mouth sores, nosebleeds, sore throat and trouble swallowing.    Eyes: Negative.  Negative for visual disturbance.   Respiratory:  Negative for cough and shortness of breath.    Cardiovascular:  Positive for leg swelling (Much better). Negative for chest pain and palpitations.   Gastrointestinal:  Negative for abdominal distention, abdominal pain, blood in stool, change in bowel habit, constipation, diarrhea, nausea and vomiting.   Endocrine: Negative.    Genitourinary:  Negative for dysuria, frequency, hematuria and urgency.   Musculoskeletal:  Negative for arthralgias, back pain, myalgias and neck pain.   Integumentary:  Negative for rash.   Neurological:  Positive for weakness. Negative for dizziness, tremors, syncope, speech difficulty, light-headedness, numbness, headaches and memory loss.   Hematological:  Negative for adenopathy. Does not bruise/bleed easily.   Psychiatric/Behavioral:  Negative for confusion and suicidal ideas. The patient is not nervous/anxious.               Vitals:    05/26/25 1254   BP: 119/78   BP Location: Left arm   Patient Position: Sitting   Pulse: 83   Resp: 18   Temp: 98.2 °F (36.8 °C)   TempSrc: Oral   SpO2: 96%   Weight: 78.2 kg (172 lb 8 oz)   Height: 5' 2" (1.575 m)        Wt Readings from Last 6 Encounters:   05/26/25 78.2 kg (172 lb 8 oz)   05/20/25 78.3 kg (172 lb 9.9 oz)   05/19/25 78.3 kg (172 lb 11.2 oz)   05/13/25 79.6 kg (175 lb 6.4 oz)   05/12/25 80.6 kg (177 lb 9.6 oz)   05/06/25 78.2 kg (172 lb 4.8 oz)     Body mass index is 31.55 kg/m².  Body surface area is 1.85 meters squared.  Physical Exam  Vitals and nursing note reviewed. Exam conducted with a chaperone present.   Constitutional:       General: She is not in acute distress.     Appearance: Normal appearance. She is well-developed.   HENT:      Head: Normocephalic and atraumatic.      " Mouth/Throat:      Mouth: Mucous membranes are moist.   Eyes:      General: No scleral icterus.     Extraocular Movements: Extraocular movements intact.      Conjunctiva/sclera: Conjunctivae normal.      Pupils: Pupils are equal, round, and reactive to light.   Neck:      Vascular: No JVD.   Cardiovascular:      Rate and Rhythm: Normal rate and regular rhythm.      Heart sounds: No murmur heard.  Pulmonary:      Effort: Pulmonary effort is normal.      Breath sounds: Normal breath sounds. No wheezing or rhonchi.   Abdominal:      General: Bowel sounds are normal. There is no distension.      Palpations: Abdomen is soft. There is no mass.      Tenderness: There is no abdominal tenderness.   Musculoskeletal:         General: No swelling or deformity.      Cervical back: Neck supple.      Right lower leg: Swelling present.      Left lower leg: Swelling present.      Comments: PICC line in place   Trace edema of the lower extremities bilaterally   Lymphadenopathy:      Head:      Right side of head: No submental or submandibular adenopathy.      Left side of head: No submental or submandibular adenopathy.      Cervical: No cervical adenopathy.      Lower Body: No right inguinal adenopathy. No left inguinal adenopathy.   Skin:     General: Skin is warm.      Coloration: Skin is not jaundiced.      Findings: No lesion or rash.      Nails: There is no clubbing.   Neurological:      General: No focal deficit present.      Mental Status: She is alert and oriented to person, place, and time.      Cranial Nerves: Cranial nerves 2-12 are intact.   Psychiatric:         Attention and Perception: Attention normal.         Behavior: Behavior is cooperative.         Judgment: Judgment normal.     ECOG SCORE             Laboratory:  CBC with Differential:  Lab Results   Component Value Date    WBC 4.16 (L) 05/26/2025    RBC 4.20 05/26/2025    HGB 11.9 (L) 05/26/2025    HCT 35.7 (L) 05/26/2025    MCV 85.0 05/26/2025    MCH 28.3  05/26/2025    MCHC 33.3 05/26/2025    RDW 14.2 05/26/2025     05/26/2025    MPV 9.2 05/26/2025    GRAN 3.2 07/27/2020    GRAN 52.6 07/27/2020    LYMPH 2.3 07/27/2020    LYMPH 37.3 07/27/2020    MONO 0.4 07/27/2020    MONO 6.5 07/27/2020    EOS 0.2 11/23/2022    BASO 0.0 11/23/2022    EOSINOPHIL 4 11/23/2022    BASOPHIL 0.8 07/27/2020        CMP:  Sodium   Date Value Ref Range Status   05/26/2025 133 (L) 136 - 145 mmol/L Final   07/27/2020 142 136 - 145 mmol/L Final     Potassium   Date Value Ref Range Status   05/26/2025 3.3 (L) 3.5 - 5.1 mmol/L Final   07/27/2020 3.9 3.5 - 5.1 mmol/L Final     Chloride   Date Value Ref Range Status   05/26/2025 101 98 - 107 mmol/L Final   07/27/2020 103 95 - 110 mmol/L Final     CO2   Date Value Ref Range Status   05/26/2025 26 23 - 31 mmol/L Final   07/27/2020 30 (H) 23 - 29 mmol/L Final     Glucose   Date Value Ref Range Status   05/26/2025 111 82 - 115 mg/dL Final   07/27/2020 161 (H) 70 - 110 mg/dL Final     BUN   Date Value Ref Range Status   07/27/2020 19 8 - 23 mg/dL Final     Blood Urea Nitrogen   Date Value Ref Range Status   05/26/2025 18.2 9.8 - 20.1 mg/dL Final     Creatinine   Date Value Ref Range Status   05/26/2025 0.84 0.55 - 1.02 mg/dL Final   07/27/2020 1.0 0.5 - 1.4 mg/dL Final     Calcium   Date Value Ref Range Status   05/26/2025 8.5 8.4 - 10.2 mg/dL Final   07/27/2020 9.4 8.7 - 10.5 mg/dL Final     Protein Total   Date Value Ref Range Status   05/26/2025 6.1 5.8 - 7.6 gm/dL Final     Albumin   Date Value Ref Range Status   05/26/2025 2.7 (L) 3.4 - 4.8 g/dL Final     Bilirubin Total   Date Value Ref Range Status   05/26/2025 0.4 <=1.5 mg/dL Final     ALP   Date Value Ref Range Status   05/26/2025 60 40 - 150 unit/L Final     AST   Date Value Ref Range Status   05/26/2025 12 11 - 45 unit/L Final     ALT   Date Value Ref Range Status   05/26/2025 14 0 - 55 unit/L Final     Anion Gap   Date Value Ref Range Status   07/27/2020 9 8 - 16 mmol/L Final     eGFR  if    Date Value Ref Range Status   07/27/2020 >60.0 >60 mL/min/1.73 m^2 Final     eGFR if non    Date Value Ref Range Status   07/27/2020 59.7 (A) >60 mL/min/1.73 m^2 Final     Comment:     Calculation used to obtain the estimated glomerular filtration  rate (eGFR) is the CKD-EPI equation.                Assessment:       1. Malignant neoplasm of cervix, unspecified site    2. 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 Chemotherapy-induced fatigue      1) Cervical cancer  --on weekly cisplatin + XRT  --Immunodeficency due to chemotherapy    2) chemotherapy-related problems:  --chemotherapy-induced fatigue  --immunodeficiency secondary to chemotherapy  --Hypokalemia-will add additional 20 mEq of KCL to her posthydration IVF's        Plan:         Cisplatin Q weekly while on XRT - due 5/27/25  Ok to proceed  Will add 20 mEq of KCL to her posthydration IVF's  Cont XRT as scheduled  RTC in 1 week with NP/same day labs/infusion next day   Labs weekly: CBC, CMP    The patient was seen, interviewed and examined. Pertinent lab and radiology studies were reviewed.   The patient was given ample opportunity to ask questions, and to the best of my abilities, all questions answered to satisfaction; patient demonstrated understanding of what we discussed and agreeable to the plan. Pt instructed to call should develop concerning signs/symptoms or have further questions.     Visit today included increased complexity associated with the care of the episodic problem treatment clearance, addressing and managing the longitudinal care of the patient's cervical cancer.     Nicole Adair MD  Hematology/Oncology    Professional Services   I, Edda Haywood LPN, acted solely as a scribe for and in the presence of Dr. Nicole Adair, who performed these services.                     [1]   Current Outpatient Medications on File Prior to Visit   Medication  Sig Dispense Refill    amLODIPine (NORVASC) 5 MG tablet Take 5 mg by mouth.      celecoxib (CELEBREX) 200 MG capsule Take 200 mg by mouth.      dexAMETHasone (DECADRON) 4 MG Tab Take 2 tablets (8 mg total) by mouth once daily. With food on days 2-4 of each chemotherapy cycle. 6 tablet 5    DULoxetine (CYMBALTA) 30 MG capsule Take 1 capsule by mouth once daily.      esomeprazole (NEXIUM) 40 MG capsule Take 1 capsule by mouth every morning.      ezetimibe (ZETIA) 10 mg tablet Take 10 mg by mouth every evening. Takes at night      gabapentin (NEURONTIN) 300 MG capsule Take 300 mg by mouth 2 (two) times daily. Take if needed      hydroCHLOROthiazide (HYDRODIURIL) 25 MG tablet Take 25 mg by mouth.      OLANZapine (ZYPREXA) 5 MG tablet Take 1 tablet by mouth nightly on days 1-4 of each chemotherapy cycle. 4 tablet 5    pramipexole (MIRAPEX) 0.5 MG tablet Take 0.5 mg by mouth.      prochlorperazine (COMPAZINE) 5 MG tablet Take 1 tablet (5 mg total) by mouth every 6 (six) hours as needed for Nausea. 20 tablet 5    rOPINIRole (REQUIP) 4 MG tablet Takes at night      traMADol (ULTRAM) 50 mg tablet 2 (two) times daily as needed. Pain    Take if needed       No current facility-administered medications on file prior to visit.

## 2025-05-21 PROCEDURE — 77386 HC IMRT, COMPLEX: CPT | Performed by: RADIOLOGY

## 2025-05-22 PROCEDURE — 77386 HC IMRT, COMPLEX: CPT | Performed by: RADIOLOGY

## 2025-05-23 DIAGNOSIS — C53.8 MALIGNANT NEOPLASM OF CERVICAL STUMP: Primary | ICD-10-CM

## 2025-05-23 PROCEDURE — 77386 HC IMRT, COMPLEX: CPT | Performed by: RADIOLOGY

## 2025-05-26 ENCOUNTER — OFFICE VISIT (OUTPATIENT)
Dept: HEMATOLOGY/ONCOLOGY | Facility: CLINIC | Age: 70
End: 2025-05-26
Payer: MEDICARE

## 2025-05-26 ENCOUNTER — RESULTS FOLLOW-UP (OUTPATIENT)
Dept: HEMATOLOGY/ONCOLOGY | Facility: CLINIC | Age: 70
End: 2025-05-26

## 2025-05-26 ENCOUNTER — LAB VISIT (OUTPATIENT)
Dept: LAB | Facility: HOSPITAL | Age: 70
End: 2025-05-26
Attending: INTERNAL MEDICINE
Payer: MEDICARE

## 2025-05-26 VITALS
WEIGHT: 172.5 LBS | TEMPERATURE: 98 F | BODY MASS INDEX: 31.74 KG/M2 | SYSTOLIC BLOOD PRESSURE: 119 MMHG | RESPIRATION RATE: 18 BRPM | OXYGEN SATURATION: 96 % | HEIGHT: 62 IN | DIASTOLIC BLOOD PRESSURE: 78 MMHG | HEART RATE: 83 BPM

## 2025-05-26 DIAGNOSIS — E87.6 HYPOKALEMIA: ICD-10-CM

## 2025-05-26 DIAGNOSIS — Z79.69 ON ANTINEOPLASTIC CHEMOTHERAPY: ICD-10-CM

## 2025-05-26 DIAGNOSIS — D84.821 IMMUNODEFICIENCY SECONDARY TO CHEMOTHERAPY: ICD-10-CM

## 2025-05-26 DIAGNOSIS — T45.1X5A IMMUNODEFICIENCY SECONDARY TO CHEMOTHERAPY: ICD-10-CM

## 2025-05-26 DIAGNOSIS — T45.1X5A CHEMOTHERAPY-INDUCED FATIGUE: ICD-10-CM

## 2025-05-26 DIAGNOSIS — Z79.69 IMMUNODEFICIENCY SECONDARY TO CHEMOTHERAPY: ICD-10-CM

## 2025-05-26 DIAGNOSIS — R53.83 CHEMOTHERAPY-INDUCED FATIGUE: ICD-10-CM

## 2025-05-26 DIAGNOSIS — C53.9 MALIGNANT NEOPLASM OF CERVIX, UNSPECIFIED SITE: ICD-10-CM

## 2025-05-26 DIAGNOSIS — C53.9 MALIGNANT NEOPLASM OF CERVIX, UNSPECIFIED SITE: Primary | ICD-10-CM

## 2025-05-26 DIAGNOSIS — C53.0 MALIGNANT NEOPLASM OF ENDOCERVIX: ICD-10-CM

## 2025-05-26 LAB
ALBUMIN SERPL-MCNC: 2.7 G/DL (ref 3.4–4.8)
ALBUMIN/GLOB SERPL: 0.8 RATIO (ref 1.1–2)
ALP SERPL-CCNC: 60 UNIT/L (ref 40–150)
ALT SERPL-CCNC: 14 UNIT/L (ref 0–55)
ANION GAP SERPL CALC-SCNC: 6 MEQ/L
AST SERPL-CCNC: 12 UNIT/L (ref 11–45)
BASOPHILS # BLD AUTO: 0.03 X10(3)/MCL
BASOPHILS NFR BLD AUTO: 0.7 %
BILIRUB SERPL-MCNC: 0.4 MG/DL
BUN SERPL-MCNC: 18.2 MG/DL (ref 9.8–20.1)
CALCIUM SERPL-MCNC: 8.5 MG/DL (ref 8.4–10.2)
CANCER AG125 SERPL-ACNC: 31.3 UNIT/ML (ref 0–35)
CHLORIDE SERPL-SCNC: 101 MMOL/L (ref 98–107)
CO2 SERPL-SCNC: 26 MMOL/L (ref 23–31)
CREAT SERPL-MCNC: 0.84 MG/DL (ref 0.55–1.02)
CREAT/UREA NIT SERPL: 22
EOSINOPHIL # BLD AUTO: 0.73 X10(3)/MCL (ref 0–0.9)
EOSINOPHIL NFR BLD AUTO: 17.5 %
ERYTHROCYTE [DISTWIDTH] IN BLOOD BY AUTOMATED COUNT: 14.2 % (ref 11.5–17)
GFR SERPLBLD CREATININE-BSD FMLA CKD-EPI: >60 ML/MIN/1.73/M2
GLOBULIN SER-MCNC: 3.4 GM/DL (ref 2.4–3.5)
GLUCOSE SERPL-MCNC: 111 MG/DL (ref 82–115)
HCT VFR BLD AUTO: 35.7 % (ref 37–47)
HGB BLD-MCNC: 11.9 G/DL (ref 12–16)
IMM GRANULOCYTES # BLD AUTO: 0.04 X10(3)/MCL (ref 0–0.04)
IMM GRANULOCYTES NFR BLD AUTO: 1 %
LYMPHOCYTES # BLD AUTO: 0.67 X10(3)/MCL (ref 0.6–4.6)
LYMPHOCYTES NFR BLD AUTO: 16.1 %
MCH RBC QN AUTO: 28.3 PG (ref 27–31)
MCHC RBC AUTO-ENTMCNC: 33.3 G/DL (ref 33–36)
MCV RBC AUTO: 85 FL (ref 80–94)
MONOCYTES # BLD AUTO: 0.39 X10(3)/MCL (ref 0.1–1.3)
MONOCYTES NFR BLD AUTO: 9.4 %
NEUTROPHILS # BLD AUTO: 2.3 X10(3)/MCL (ref 2.1–9.2)
NEUTROPHILS NFR BLD AUTO: 55.3 %
PLATELET # BLD AUTO: 134 X10(3)/MCL (ref 130–400)
PMV BLD AUTO: 9.2 FL (ref 7.4–10.4)
POTASSIUM SERPL-SCNC: 3.3 MMOL/L (ref 3.5–5.1)
PROT SERPL-MCNC: 6.1 GM/DL (ref 5.8–7.6)
RBC # BLD AUTO: 4.2 X10(6)/MCL (ref 4.2–5.4)
SODIUM SERPL-SCNC: 133 MMOL/L (ref 136–145)
WBC # BLD AUTO: 4.16 X10(3)/MCL (ref 4.5–11.5)

## 2025-05-26 PROCEDURE — 1160F RVW MEDS BY RX/DR IN RCRD: CPT | Mod: CPTII,S$GLB,COE, | Performed by: INTERNAL MEDICINE

## 2025-05-26 PROCEDURE — 36415 COLL VENOUS BLD VENIPUNCTURE: CPT

## 2025-05-26 PROCEDURE — 3008F BODY MASS INDEX DOCD: CPT | Mod: CPTII,S$GLB,COE, | Performed by: INTERNAL MEDICINE

## 2025-05-26 PROCEDURE — G2211 COMPLEX E/M VISIT ADD ON: HCPCS | Mod: S$GLB,COE,, | Performed by: INTERNAL MEDICINE

## 2025-05-26 PROCEDURE — 99215 OFFICE O/P EST HI 40 MIN: CPT | Mod: S$GLB,COE,, | Performed by: INTERNAL MEDICINE

## 2025-05-26 PROCEDURE — 80053 COMPREHEN METABOLIC PANEL: CPT

## 2025-05-26 PROCEDURE — 86304 IMMUNOASSAY TUMOR CA 125: CPT

## 2025-05-26 PROCEDURE — 3288F FALL RISK ASSESSMENT DOCD: CPT | Mod: CPTII,S$GLB,COE, | Performed by: INTERNAL MEDICINE

## 2025-05-26 PROCEDURE — 3078F DIAST BP <80 MM HG: CPT | Mod: CPTII,S$GLB,COE, | Performed by: INTERNAL MEDICINE

## 2025-05-26 PROCEDURE — 99999 PR PBB SHADOW E&M-EST. PATIENT-LVL IV: CPT | Mod: PBBFAC,COE,, | Performed by: INTERNAL MEDICINE

## 2025-05-26 PROCEDURE — 85025 COMPLETE CBC W/AUTO DIFF WBC: CPT

## 2025-05-26 PROCEDURE — 3044F HG A1C LEVEL LT 7.0%: CPT | Mod: CPTII,S$GLB,COE, | Performed by: INTERNAL MEDICINE

## 2025-05-26 PROCEDURE — 1101F PT FALLS ASSESS-DOCD LE1/YR: CPT | Mod: CPTII,S$GLB,COE, | Performed by: INTERNAL MEDICINE

## 2025-05-26 PROCEDURE — 1126F AMNT PAIN NOTED NONE PRSNT: CPT | Mod: CPTII,S$GLB,COE, | Performed by: INTERNAL MEDICINE

## 2025-05-26 PROCEDURE — 1159F MED LIST DOCD IN RCRD: CPT | Mod: CPTII,S$GLB,COE, | Performed by: INTERNAL MEDICINE

## 2025-05-26 PROCEDURE — 3074F SYST BP LT 130 MM HG: CPT | Mod: CPTII,S$GLB,COE, | Performed by: INTERNAL MEDICINE

## 2025-05-26 RX ORDER — DIPHENHYDRAMINE HYDROCHLORIDE 50 MG/ML
50 INJECTION, SOLUTION INTRAMUSCULAR; INTRAVENOUS ONCE AS NEEDED
Status: CANCELLED | OUTPATIENT
Start: 2025-05-27

## 2025-05-26 RX ORDER — SODIUM CHLORIDE 0.9 % (FLUSH) 0.9 %
10 SYRINGE (ML) INJECTION
Status: CANCELLED | OUTPATIENT
Start: 2025-05-27

## 2025-05-26 RX ORDER — HEPARIN 100 UNIT/ML
500 SYRINGE INTRAVENOUS
Status: CANCELLED | OUTPATIENT
Start: 2025-05-27

## 2025-05-26 RX ORDER — EPINEPHRINE 0.3 MG/.3ML
0.3 INJECTION SUBCUTANEOUS ONCE AS NEEDED
Status: CANCELLED | OUTPATIENT
Start: 2025-05-27

## 2025-05-26 RX ORDER — PROCHLORPERAZINE EDISYLATE 5 MG/ML
5 INJECTION INTRAMUSCULAR; INTRAVENOUS ONCE AS NEEDED
Status: CANCELLED | OUTPATIENT
Start: 2025-05-27

## 2025-05-27 ENCOUNTER — CLINICAL SUPPORT (OUTPATIENT)
Dept: HEMATOLOGY/ONCOLOGY | Facility: CLINIC | Age: 70
End: 2025-05-27
Payer: MEDICARE

## 2025-05-27 ENCOUNTER — INFUSION (OUTPATIENT)
Dept: INFUSION THERAPY | Facility: HOSPITAL | Age: 70
End: 2025-05-27
Attending: INTERNAL MEDICINE
Payer: MEDICARE

## 2025-05-27 VITALS
WEIGHT: 169.88 LBS | HEART RATE: 81 BPM | SYSTOLIC BLOOD PRESSURE: 131 MMHG | BODY MASS INDEX: 31.08 KG/M2 | OXYGEN SATURATION: 97 % | DIASTOLIC BLOOD PRESSURE: 84 MMHG | TEMPERATURE: 98 F

## 2025-05-27 DIAGNOSIS — C53.9 MALIGNANT NEOPLASM OF CERVIX, UNSPECIFIED SITE: Primary | ICD-10-CM

## 2025-05-27 PROCEDURE — 63600175 PHARM REV CODE 636 W HCPCS: Performed by: INTERNAL MEDICINE

## 2025-05-27 PROCEDURE — 96366 THER/PROPH/DIAG IV INF ADDON: CPT

## 2025-05-27 PROCEDURE — 25000003 PHARM REV CODE 250: Performed by: INTERNAL MEDICINE

## 2025-05-27 PROCEDURE — 77386 HC IMRT, COMPLEX: CPT | Performed by: RADIOLOGY

## 2025-05-27 PROCEDURE — A4216 STERILE WATER/SALINE, 10 ML: HCPCS | Performed by: INTERNAL MEDICINE

## 2025-05-27 PROCEDURE — 96413 CHEMO IV INFUSION 1 HR: CPT

## 2025-05-27 PROCEDURE — 96375 TX/PRO/DX INJ NEW DRUG ADDON: CPT

## 2025-05-27 PROCEDURE — 77336 RADIATION PHYSICS CONSULT: CPT | Performed by: RADIOLOGY

## 2025-05-27 PROCEDURE — 96367 TX/PROPH/DG ADDL SEQ IV INF: CPT

## 2025-05-27 RX ORDER — EPINEPHRINE 0.3 MG/.3ML
0.3 INJECTION SUBCUTANEOUS ONCE AS NEEDED
Status: DISCONTINUED | OUTPATIENT
Start: 2025-05-27 | End: 2025-05-27 | Stop reason: HOSPADM

## 2025-05-27 RX ORDER — SODIUM CHLORIDE 0.9 % (FLUSH) 0.9 %
10 SYRINGE (ML) INJECTION
Status: DISCONTINUED | OUTPATIENT
Start: 2025-05-27 | End: 2025-05-27 | Stop reason: HOSPADM

## 2025-05-27 RX ORDER — PROCHLORPERAZINE EDISYLATE 5 MG/ML
5 INJECTION INTRAMUSCULAR; INTRAVENOUS ONCE AS NEEDED
Status: DISCONTINUED | OUTPATIENT
Start: 2025-05-27 | End: 2025-05-27 | Stop reason: HOSPADM

## 2025-05-27 RX ORDER — DIPHENHYDRAMINE HYDROCHLORIDE 50 MG/ML
50 INJECTION, SOLUTION INTRAMUSCULAR; INTRAVENOUS ONCE AS NEEDED
Status: DISCONTINUED | OUTPATIENT
Start: 2025-05-27 | End: 2025-05-27 | Stop reason: HOSPADM

## 2025-05-27 RX ORDER — HEPARIN 100 UNIT/ML
500 SYRINGE INTRAVENOUS
Status: DISCONTINUED | OUTPATIENT
Start: 2025-05-27 | End: 2025-05-27 | Stop reason: HOSPADM

## 2025-05-27 RX ADMIN — Medication 10 ML: at 01:05

## 2025-05-27 RX ADMIN — POTASSIUM CHLORIDE 500 ML/HR: 2 INJECTION, SOLUTION, CONCENTRATE INTRAVENOUS at 12:05

## 2025-05-27 RX ADMIN — APREPITANT 130 MG: 130 INJECTION, EMULSION INTRAVENOUS at 11:05

## 2025-05-27 RX ADMIN — HEPARIN 500 UNITS: 100 SYRINGE at 01:05

## 2025-05-27 RX ADMIN — CISPLATIN 56 MG: 1 INJECTION, SOLUTION INTRAVENOUS at 11:05

## 2025-05-27 RX ADMIN — SODIUM CHLORIDE 0.25 MG: 9 INJECTION, SOLUTION INTRAVENOUS at 10:05

## 2025-05-27 RX ADMIN — POTASSIUM CHLORIDE 500 ML/HR: 2 INJECTION, SOLUTION, CONCENTRATE INTRAVENOUS at 08:05

## 2025-05-27 RX ADMIN — SODIUM CHLORIDE: 9 INJECTION, SOLUTION INTRAVENOUS at 08:05

## 2025-05-27 NOTE — NURSING
"Oncology Navigation   Intake  Date of Diagnosis: 25  Cancer Type: Gynecologic     Treatment     Surgical Oncologist: Dr. Jeremy Browne  Consult Date: 25    Medical Oncologist: Dr. Nicole Adair  Consult Date: 25  Chemotherapy: Planned       Procedures: Port / PICC  Port / PICC Schedule Date: 25             Support Systems: Spouse/significant other; Family members  Barriers of Care: Barriers to Care "Assessment completed-no barriers noted"     Acuity  Stage: 1  Systemic Treatment - predicted or initiated: More than one treatment modality concurrently (chemotherapy, radiation, etc.) (+2)  Treatment Tolerability: Has not started treatment yet/treatment fully completed and side effects resolved  ECO  Comorbidities in Medical History: 2  Hospitalization Within the Past Month: 0   Needed: 0  Support: 0  Verbalizes Financial Concerns: 0  Transportation: 0  Mental Health: PHQ Score: 0 (Distress 0/10)  History of noncompliance/frequent no shows and cancellations: 0  Verbalizes the need for more education: 0  Navigation Acuity: 6     Follow Up  PRN     Met with patient today for navigation follow up during her infusion. She is accompanied by her . Assessed for barriers to care. Patient denies financial or transportation concerns. She denies anxiety or depression. She states that she has home health for PICC care. She has no need of DME. She feels that she is tolerating treatment well. She denies any questions or concerns today. She is aware of how to reach navigation should she need to.   "

## 2025-05-28 PROCEDURE — 77386 HC IMRT, COMPLEX: CPT | Performed by: RADIOLOGY

## 2025-05-29 PROCEDURE — 77386 HC IMRT, COMPLEX: CPT | Performed by: RADIOLOGY

## 2025-05-30 PROCEDURE — 77386 HC IMRT, COMPLEX: CPT

## 2025-06-02 ENCOUNTER — OFFICE VISIT (OUTPATIENT)
Dept: HEMATOLOGY/ONCOLOGY | Facility: CLINIC | Age: 70
End: 2025-06-02
Payer: MEDICARE

## 2025-06-02 ENCOUNTER — LAB VISIT (OUTPATIENT)
Dept: LAB | Facility: HOSPITAL | Age: 70
End: 2025-06-02
Attending: INTERNAL MEDICINE
Payer: MEDICARE

## 2025-06-02 ENCOUNTER — CLINICAL SUPPORT (OUTPATIENT)
Dept: RADIATION THERAPY | Facility: HOSPITAL | Age: 70
End: 2025-06-02
Attending: RADIOLOGY
Payer: MEDICARE

## 2025-06-02 VITALS
TEMPERATURE: 98 F | BODY MASS INDEX: 31.31 KG/M2 | HEART RATE: 83 BPM | OXYGEN SATURATION: 97 % | HEIGHT: 62 IN | WEIGHT: 170.13 LBS | RESPIRATION RATE: 18 BRPM | DIASTOLIC BLOOD PRESSURE: 77 MMHG | SYSTOLIC BLOOD PRESSURE: 118 MMHG

## 2025-06-02 DIAGNOSIS — Z79.69 ON ANTINEOPLASTIC CHEMOTHERAPY: ICD-10-CM

## 2025-06-02 DIAGNOSIS — Z79.69 IMMUNODEFICIENCY SECONDARY TO CHEMOTHERAPY: ICD-10-CM

## 2025-06-02 DIAGNOSIS — C53.9 MALIGNANT NEOPLASM OF CERVIX, UNSPECIFIED SITE: ICD-10-CM

## 2025-06-02 DIAGNOSIS — E87.6 HYPOKALEMIA: ICD-10-CM

## 2025-06-02 DIAGNOSIS — R97.8 OTHER ABNORMAL TUMOR MARKERS: ICD-10-CM

## 2025-06-02 DIAGNOSIS — C53.9 MALIGNANT NEOPLASM OF CERVIX, UNSPECIFIED SITE: Primary | ICD-10-CM

## 2025-06-02 DIAGNOSIS — C53.0 MALIGNANT NEOPLASM OF ENDOCERVIX: ICD-10-CM

## 2025-06-02 DIAGNOSIS — T45.1X5A IMMUNODEFICIENCY SECONDARY TO CHEMOTHERAPY: ICD-10-CM

## 2025-06-02 DIAGNOSIS — D84.821 IMMUNODEFICIENCY SECONDARY TO CHEMOTHERAPY: ICD-10-CM

## 2025-06-02 LAB
ALBUMIN SERPL-MCNC: 2.8 G/DL (ref 3.4–4.8)
ALBUMIN/GLOB SERPL: 0.8 RATIO (ref 1.1–2)
ALP SERPL-CCNC: 58 UNIT/L (ref 40–150)
ALT SERPL-CCNC: 14 UNIT/L (ref 0–55)
ANION GAP SERPL CALC-SCNC: 12 MEQ/L
AST SERPL-CCNC: 16 UNIT/L (ref 11–45)
BASOPHILS # BLD AUTO: 0.01 X10(3)/MCL
BASOPHILS NFR BLD AUTO: 0.4 %
BILIRUB SERPL-MCNC: 0.4 MG/DL
BUN SERPL-MCNC: 21 MG/DL (ref 9.8–20.1)
CALCIUM SERPL-MCNC: 8.8 MG/DL (ref 8.4–10.2)
CHLORIDE SERPL-SCNC: 101 MMOL/L (ref 98–107)
CO2 SERPL-SCNC: 25 MMOL/L (ref 23–31)
CREAT SERPL-MCNC: 0.88 MG/DL (ref 0.55–1.02)
CREAT/UREA NIT SERPL: 24
EOSINOPHIL # BLD AUTO: 0.21 X10(3)/MCL (ref 0–0.9)
EOSINOPHIL NFR BLD AUTO: 8.1 %
ERYTHROCYTE [DISTWIDTH] IN BLOOD BY AUTOMATED COUNT: 14.6 % (ref 11.5–17)
GFR SERPLBLD CREATININE-BSD FMLA CKD-EPI: >60 ML/MIN/1.73/M2
GLOBULIN SER-MCNC: 3.5 GM/DL (ref 2.4–3.5)
GLUCOSE SERPL-MCNC: 90 MG/DL (ref 82–115)
HCT VFR BLD AUTO: 35.7 % (ref 37–47)
HGB BLD-MCNC: 11.8 G/DL (ref 12–16)
IMM GRANULOCYTES # BLD AUTO: 0.04 X10(3)/MCL (ref 0–0.04)
IMM GRANULOCYTES NFR BLD AUTO: 1.5 %
LYMPHOCYTES # BLD AUTO: 0.33 X10(3)/MCL (ref 0.6–4.6)
LYMPHOCYTES NFR BLD AUTO: 12.7 %
MCH RBC QN AUTO: 28.1 PG (ref 27–31)
MCHC RBC AUTO-ENTMCNC: 33.1 G/DL (ref 33–36)
MCV RBC AUTO: 85 FL (ref 80–94)
MONOCYTES # BLD AUTO: 0.31 X10(3)/MCL (ref 0.1–1.3)
MONOCYTES NFR BLD AUTO: 12 %
NEUTROPHILS # BLD AUTO: 1.69 X10(3)/MCL (ref 2.1–9.2)
NEUTROPHILS NFR BLD AUTO: 65.3 %
PLATELET # BLD AUTO: 148 X10(3)/MCL (ref 130–400)
PMV BLD AUTO: 8.8 FL (ref 7.4–10.4)
POTASSIUM SERPL-SCNC: 3.7 MMOL/L (ref 3.5–5.1)
PROT SERPL-MCNC: 6.3 GM/DL (ref 5.8–7.6)
RBC # BLD AUTO: 4.2 X10(6)/MCL (ref 4.2–5.4)
SODIUM SERPL-SCNC: 138 MMOL/L (ref 136–145)
WBC # BLD AUTO: 2.59 X10(3)/MCL (ref 4.5–11.5)

## 2025-06-02 PROCEDURE — 3078F DIAST BP <80 MM HG: CPT | Mod: CPTII,S$GLB,COE, | Performed by: INTERNAL MEDICINE

## 2025-06-02 PROCEDURE — 3074F SYST BP LT 130 MM HG: CPT | Mod: CPTII,S$GLB,COE, | Performed by: INTERNAL MEDICINE

## 2025-06-02 PROCEDURE — 3044F HG A1C LEVEL LT 7.0%: CPT | Mod: CPTII,S$GLB,COE, | Performed by: INTERNAL MEDICINE

## 2025-06-02 PROCEDURE — 77386 HC IMRT, COMPLEX: CPT | Performed by: RADIOLOGY

## 2025-06-02 PROCEDURE — 99215 OFFICE O/P EST HI 40 MIN: CPT | Mod: S$GLB,COE,, | Performed by: INTERNAL MEDICINE

## 2025-06-02 PROCEDURE — 3008F BODY MASS INDEX DOCD: CPT | Mod: CPTII,S$GLB,COE, | Performed by: INTERNAL MEDICINE

## 2025-06-02 PROCEDURE — 80053 COMPREHEN METABOLIC PANEL: CPT

## 2025-06-02 PROCEDURE — 99999 PR PBB SHADOW E&M-EST. PATIENT-LVL IV: CPT | Mod: PBBFAC,COE,, | Performed by: INTERNAL MEDICINE

## 2025-06-02 PROCEDURE — G2211 COMPLEX E/M VISIT ADD ON: HCPCS | Mod: S$GLB,COE,, | Performed by: INTERNAL MEDICINE

## 2025-06-02 PROCEDURE — 1126F AMNT PAIN NOTED NONE PRSNT: CPT | Mod: CPTII,S$GLB,COE, | Performed by: INTERNAL MEDICINE

## 2025-06-02 PROCEDURE — 77336 RADIATION PHYSICS CONSULT: CPT | Performed by: RADIOLOGY

## 2025-06-02 PROCEDURE — 36415 COLL VENOUS BLD VENIPUNCTURE: CPT

## 2025-06-02 PROCEDURE — 1159F MED LIST DOCD IN RCRD: CPT | Mod: CPTII,S$GLB,COE, | Performed by: INTERNAL MEDICINE

## 2025-06-02 PROCEDURE — 85025 COMPLETE CBC W/AUTO DIFF WBC: CPT

## 2025-06-02 RX ORDER — SODIUM CHLORIDE 0.9 % (FLUSH) 0.9 %
10 SYRINGE (ML) INJECTION
Status: CANCELLED | OUTPATIENT
Start: 2025-06-03

## 2025-06-02 RX ORDER — HEPARIN 100 UNIT/ML
500 SYRINGE INTRAVENOUS
Status: CANCELLED | OUTPATIENT
Start: 2025-06-03

## 2025-06-02 RX ORDER — PROCHLORPERAZINE EDISYLATE 5 MG/ML
5 INJECTION INTRAMUSCULAR; INTRAVENOUS ONCE AS NEEDED
Status: CANCELLED | OUTPATIENT
Start: 2025-06-03

## 2025-06-02 RX ORDER — EPINEPHRINE 0.3 MG/.3ML
0.3 INJECTION SUBCUTANEOUS ONCE AS NEEDED
Status: CANCELLED | OUTPATIENT
Start: 2025-06-03

## 2025-06-02 RX ORDER — DIPHENHYDRAMINE HYDROCHLORIDE 50 MG/ML
50 INJECTION, SOLUTION INTRAMUSCULAR; INTRAVENOUS ONCE AS NEEDED
Status: CANCELLED | OUTPATIENT
Start: 2025-06-03

## 2025-06-03 ENCOUNTER — INFUSION (OUTPATIENT)
Dept: INFUSION THERAPY | Facility: HOSPITAL | Age: 70
End: 2025-06-03
Attending: INTERNAL MEDICINE
Payer: MEDICARE

## 2025-06-03 VITALS — HEART RATE: 84 BPM | DIASTOLIC BLOOD PRESSURE: 83 MMHG | SYSTOLIC BLOOD PRESSURE: 128 MMHG

## 2025-06-03 DIAGNOSIS — C53.9 MALIGNANT NEOPLASM OF CERVIX, UNSPECIFIED SITE: Primary | ICD-10-CM

## 2025-06-03 PROCEDURE — 96367 TX/PROPH/DG ADDL SEQ IV INF: CPT

## 2025-06-03 PROCEDURE — 96366 THER/PROPH/DIAG IV INF ADDON: CPT

## 2025-06-03 PROCEDURE — 96413 CHEMO IV INFUSION 1 HR: CPT

## 2025-06-03 PROCEDURE — 77386 HC IMRT, COMPLEX: CPT | Performed by: RADIOLOGY

## 2025-06-03 PROCEDURE — 96375 TX/PRO/DX INJ NEW DRUG ADDON: CPT

## 2025-06-03 PROCEDURE — 96523 IRRIG DRUG DELIVERY DEVICE: CPT

## 2025-06-03 PROCEDURE — 63600175 PHARM REV CODE 636 W HCPCS: Mod: JZ,TB | Performed by: INTERNAL MEDICINE

## 2025-06-03 PROCEDURE — 25000003 PHARM REV CODE 250: Performed by: INTERNAL MEDICINE

## 2025-06-03 RX ORDER — DIPHENHYDRAMINE HYDROCHLORIDE 50 MG/ML
50 INJECTION, SOLUTION INTRAMUSCULAR; INTRAVENOUS ONCE AS NEEDED
Status: DISCONTINUED | OUTPATIENT
Start: 2025-06-03 | End: 2025-06-03 | Stop reason: HOSPADM

## 2025-06-03 RX ORDER — HEPARIN 100 UNIT/ML
500 SYRINGE INTRAVENOUS
Status: DISCONTINUED | OUTPATIENT
Start: 2025-06-03 | End: 2025-06-03 | Stop reason: HOSPADM

## 2025-06-03 RX ORDER — SODIUM CHLORIDE 0.9 % (FLUSH) 0.9 %
10 SYRINGE (ML) INJECTION
Status: DISCONTINUED | OUTPATIENT
Start: 2025-06-03 | End: 2025-06-03 | Stop reason: HOSPADM

## 2025-06-03 RX ORDER — PROCHLORPERAZINE EDISYLATE 5 MG/ML
5 INJECTION INTRAMUSCULAR; INTRAVENOUS ONCE AS NEEDED
Status: DISCONTINUED | OUTPATIENT
Start: 2025-06-03 | End: 2025-06-03 | Stop reason: HOSPADM

## 2025-06-03 RX ORDER — EPINEPHRINE 0.3 MG/.3ML
0.3 INJECTION SUBCUTANEOUS ONCE AS NEEDED
Status: DISCONTINUED | OUTPATIENT
Start: 2025-06-03 | End: 2025-06-03 | Stop reason: HOSPADM

## 2025-06-03 RX ADMIN — DEXAMETHASONE SODIUM PHOSPHATE 0.25 MG: 4 INJECTION, SOLUTION INTRA-ARTICULAR; INTRALESIONAL; INTRAMUSCULAR; INTRAVENOUS; SOFT TISSUE at 10:06

## 2025-06-03 RX ADMIN — SODIUM CHLORIDE AND POTASSIUM CHLORIDE: .9; .15 SOLUTION INTRAVENOUS at 08:06

## 2025-06-03 RX ADMIN — APREPITANT 130 MG: 130 INJECTION, EMULSION INTRAVENOUS at 10:06

## 2025-06-03 RX ADMIN — POTASSIUM CHLORIDE 500 ML/HR: 2 INJECTION, SOLUTION, CONCENTRATE INTRAVENOUS at 11:06

## 2025-06-03 RX ADMIN — CISPLATIN 56 MG: 1 INJECTION, SOLUTION INTRAVENOUS at 10:06

## 2025-06-04 PROCEDURE — 77386 HC IMRT, COMPLEX: CPT | Performed by: RADIOLOGY

## 2025-06-05 PROCEDURE — 77386 HC IMRT, COMPLEX: CPT | Performed by: RADIOLOGY

## 2025-06-06 ENCOUNTER — APPOINTMENT (OUTPATIENT)
Dept: RADIOLOGY | Facility: HOSPITAL | Age: 70
End: 2025-06-06
Attending: RADIOLOGY
Payer: MEDICARE

## 2025-06-06 DIAGNOSIS — C53.8 MALIGNANT NEOPLASM OF CERVICAL STUMP: ICD-10-CM

## 2025-06-06 PROCEDURE — A9577 INJ MULTIHANCE: HCPCS | Performed by: RADIOLOGY

## 2025-06-06 PROCEDURE — 77386 HC IMRT, COMPLEX: CPT

## 2025-06-06 PROCEDURE — 25500020 PHARM REV CODE 255: Performed by: RADIOLOGY

## 2025-06-06 PROCEDURE — 72197 MRI PELVIS W/O & W/DYE: CPT | Mod: TC

## 2025-06-06 RX ADMIN — GADOBENATE DIMEGLUMINE 15 ML: 529 INJECTION, SOLUTION INTRAVENOUS at 07:06

## 2025-06-09 ENCOUNTER — LAB VISIT (OUTPATIENT)
Dept: LAB | Facility: HOSPITAL | Age: 70
End: 2025-06-09
Attending: INTERNAL MEDICINE
Payer: MEDICARE

## 2025-06-09 ENCOUNTER — OFFICE VISIT (OUTPATIENT)
Dept: HEMATOLOGY/ONCOLOGY | Facility: CLINIC | Age: 70
End: 2025-06-09
Payer: MEDICARE

## 2025-06-09 VITALS
RESPIRATION RATE: 18 BRPM | BODY MASS INDEX: 30.91 KG/M2 | TEMPERATURE: 98 F | SYSTOLIC BLOOD PRESSURE: 119 MMHG | WEIGHT: 168 LBS | OXYGEN SATURATION: 97 % | DIASTOLIC BLOOD PRESSURE: 80 MMHG | HEART RATE: 80 BPM | HEIGHT: 62 IN

## 2025-06-09 DIAGNOSIS — R53.83 CHEMOTHERAPY-INDUCED FATIGUE: ICD-10-CM

## 2025-06-09 DIAGNOSIS — T45.1X5A CHEMOTHERAPY-INDUCED FATIGUE: ICD-10-CM

## 2025-06-09 DIAGNOSIS — Z79.69 IMMUNODEFICIENCY SECONDARY TO CHEMOTHERAPY: ICD-10-CM

## 2025-06-09 DIAGNOSIS — C53.9 MALIGNANT NEOPLASM OF CERVIX, UNSPECIFIED SITE: ICD-10-CM

## 2025-06-09 DIAGNOSIS — Z79.69 ON ANTINEOPLASTIC CHEMOTHERAPY: ICD-10-CM

## 2025-06-09 DIAGNOSIS — T45.1X5A IMMUNODEFICIENCY SECONDARY TO CHEMOTHERAPY: ICD-10-CM

## 2025-06-09 DIAGNOSIS — D84.821 IMMUNODEFICIENCY SECONDARY TO CHEMOTHERAPY: ICD-10-CM

## 2025-06-09 DIAGNOSIS — C53.0 MALIGNANT NEOPLASM OF ENDOCERVIX: Primary | ICD-10-CM

## 2025-06-09 LAB
ALBUMIN SERPL-MCNC: 2.8 G/DL (ref 3.4–4.8)
ALBUMIN/GLOB SERPL: 0.8 RATIO (ref 1.1–2)
ALP SERPL-CCNC: 60 UNIT/L (ref 40–150)
ALT SERPL-CCNC: 16 UNIT/L (ref 0–55)
ANION GAP SERPL CALC-SCNC: 8 MEQ/L
AST SERPL-CCNC: 13 UNIT/L (ref 11–45)
BASOPHILS # BLD AUTO: 0.01 X10(3)/MCL
BASOPHILS NFR BLD AUTO: 0.4 %
BILIRUB SERPL-MCNC: 0.5 MG/DL
BUN SERPL-MCNC: 19.5 MG/DL (ref 9.8–20.1)
CALCIUM SERPL-MCNC: 8.9 MG/DL (ref 8.4–10.2)
CHLORIDE SERPL-SCNC: 102 MMOL/L (ref 98–107)
CO2 SERPL-SCNC: 27 MMOL/L (ref 23–31)
CREAT SERPL-MCNC: 0.9 MG/DL (ref 0.55–1.02)
CREAT/UREA NIT SERPL: 22
EOSINOPHIL # BLD AUTO: 0.13 X10(3)/MCL (ref 0–0.9)
EOSINOPHIL NFR BLD AUTO: 5.6 %
ERYTHROCYTE [DISTWIDTH] IN BLOOD BY AUTOMATED COUNT: 15.4 % (ref 11.5–17)
GFR SERPLBLD CREATININE-BSD FMLA CKD-EPI: >60 ML/MIN/1.73/M2
GLOBULIN SER-MCNC: 3.5 GM/DL (ref 2.4–3.5)
GLUCOSE SERPL-MCNC: 115 MG/DL (ref 82–115)
HCT VFR BLD AUTO: 33.8 % (ref 37–47)
HGB BLD-MCNC: 11.2 G/DL (ref 12–16)
IMM GRANULOCYTES # BLD AUTO: 0.03 X10(3)/MCL (ref 0–0.04)
IMM GRANULOCYTES NFR BLD AUTO: 1.3 %
LYMPHOCYTES # BLD AUTO: 0.27 X10(3)/MCL (ref 0.6–4.6)
LYMPHOCYTES NFR BLD AUTO: 11.6 %
MCH RBC QN AUTO: 28.2 PG (ref 27–31)
MCHC RBC AUTO-ENTMCNC: 33.1 G/DL (ref 33–36)
MCV RBC AUTO: 85.1 FL (ref 80–94)
MONOCYTES # BLD AUTO: 0.26 X10(3)/MCL (ref 0.1–1.3)
MONOCYTES NFR BLD AUTO: 11.2 %
NEUTROPHILS # BLD AUTO: 1.62 X10(3)/MCL (ref 2.1–9.2)
NEUTROPHILS NFR BLD AUTO: 69.9 %
PLATELET # BLD AUTO: 112 X10(3)/MCL (ref 130–400)
PMV BLD AUTO: 9.1 FL (ref 7.4–10.4)
POTASSIUM SERPL-SCNC: 4.1 MMOL/L (ref 3.5–5.1)
PROT SERPL-MCNC: 6.3 GM/DL (ref 5.8–7.6)
RBC # BLD AUTO: 3.97 X10(6)/MCL (ref 4.2–5.4)
SODIUM SERPL-SCNC: 137 MMOL/L (ref 136–145)
WBC # BLD AUTO: 2.32 X10(3)/MCL (ref 4.5–11.5)

## 2025-06-09 PROCEDURE — 99215 OFFICE O/P EST HI 40 MIN: CPT | Mod: S$GLB,COE,, | Performed by: INTERNAL MEDICINE

## 2025-06-09 PROCEDURE — 80053 COMPREHEN METABOLIC PANEL: CPT

## 2025-06-09 PROCEDURE — 1160F RVW MEDS BY RX/DR IN RCRD: CPT | Mod: CPTII,S$GLB,COE, | Performed by: INTERNAL MEDICINE

## 2025-06-09 PROCEDURE — 1126F AMNT PAIN NOTED NONE PRSNT: CPT | Mod: CPTII,S$GLB,COE, | Performed by: INTERNAL MEDICINE

## 2025-06-09 PROCEDURE — 85025 COMPLETE CBC W/AUTO DIFF WBC: CPT

## 2025-06-09 PROCEDURE — 77386 HC IMRT, COMPLEX: CPT | Performed by: RADIOLOGY

## 2025-06-09 PROCEDURE — 3044F HG A1C LEVEL LT 7.0%: CPT | Mod: CPTII,S$GLB,COE, | Performed by: INTERNAL MEDICINE

## 2025-06-09 PROCEDURE — 3074F SYST BP LT 130 MM HG: CPT | Mod: CPTII,S$GLB,COE, | Performed by: INTERNAL MEDICINE

## 2025-06-09 PROCEDURE — 3008F BODY MASS INDEX DOCD: CPT | Mod: CPTII,S$GLB,COE, | Performed by: INTERNAL MEDICINE

## 2025-06-09 PROCEDURE — 3079F DIAST BP 80-89 MM HG: CPT | Mod: CPTII,S$GLB,COE, | Performed by: INTERNAL MEDICINE

## 2025-06-09 PROCEDURE — 1101F PT FALLS ASSESS-DOCD LE1/YR: CPT | Mod: CPTII,S$GLB,COE, | Performed by: INTERNAL MEDICINE

## 2025-06-09 PROCEDURE — G2211 COMPLEX E/M VISIT ADD ON: HCPCS | Mod: S$GLB,COE,, | Performed by: INTERNAL MEDICINE

## 2025-06-09 PROCEDURE — 1159F MED LIST DOCD IN RCRD: CPT | Mod: CPTII,S$GLB,COE, | Performed by: INTERNAL MEDICINE

## 2025-06-09 PROCEDURE — 3288F FALL RISK ASSESSMENT DOCD: CPT | Mod: CPTII,S$GLB,COE, | Performed by: INTERNAL MEDICINE

## 2025-06-09 PROCEDURE — 77336 RADIATION PHYSICS CONSULT: CPT | Performed by: RADIOLOGY

## 2025-06-09 PROCEDURE — 36415 COLL VENOUS BLD VENIPUNCTURE: CPT

## 2025-06-09 PROCEDURE — 99999 PR PBB SHADOW E&M-EST. PATIENT-LVL IV: CPT | Mod: PBBFAC,COE,, | Performed by: INTERNAL MEDICINE

## 2025-06-09 RX ORDER — DIPHENHYDRAMINE HYDROCHLORIDE 50 MG/ML
50 INJECTION, SOLUTION INTRAMUSCULAR; INTRAVENOUS ONCE AS NEEDED
Status: CANCELLED | OUTPATIENT
Start: 2025-06-10

## 2025-06-09 RX ORDER — EPINEPHRINE 0.3 MG/.3ML
0.3 INJECTION SUBCUTANEOUS ONCE AS NEEDED
Status: CANCELLED | OUTPATIENT
Start: 2025-06-10

## 2025-06-09 RX ORDER — HEPARIN 100 UNIT/ML
500 SYRINGE INTRAVENOUS
Status: CANCELLED | OUTPATIENT
Start: 2025-06-10

## 2025-06-09 RX ORDER — PROCHLORPERAZINE EDISYLATE 5 MG/ML
5 INJECTION INTRAMUSCULAR; INTRAVENOUS ONCE AS NEEDED
Status: CANCELLED | OUTPATIENT
Start: 2025-06-10

## 2025-06-09 RX ORDER — SODIUM CHLORIDE 0.9 % (FLUSH) 0.9 %
10 SYRINGE (ML) INJECTION
Status: CANCELLED | OUTPATIENT
Start: 2025-06-10

## 2025-06-09 NOTE — PROGRESS NOTES
HEMATOLOGY/ONCOLOGY OFFICE CLINIC VISIT    Visit Information:  Visit type:    Follow up - Oncology  Accompanied by:     Source of history:  Self  History limitation:  None      Initial Evaluation:  04/16/2025  Referring Provider:  Dr. Jeremy Browne  Other providers: Dr. 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 appears to cause obstruction with distension of the endometrial cavity with fluid. There was distention of the endometrial cavity with fluid.  There was no free pelvic fluid.  Uterus was 11 x 5 x 5 cm.   MRI pelvis 4/24/2025:A 5 cm in greatest dimension cervical cancer with invasion of the lower third of the uterus and upper third of the vagina. Bilateral parametrial invasion. No evidence of pelvic sidewall invasion. Right external iliac lymphadenopathy. The mass encroaches upon but does not clearly invade the posterior wall of the bladder. The mass involves the anterior wall of the upper third of the vagina. The tumor extends within 1 cm of the right pelvic sidewall, but does not encroach upon the left pelvic sidewall. The mass invades the lower third of the uterus.   PET CT 4/24/2025: A 5 x 4 cm hypermetabolic cervical mass (Max SUV 12.3). There is a 1.2 cm right external iliac lymph node with elevated uptake (Max SUV 5.3). No other suspicious activity in the abdomen or pelvis.     Pathology:  3/19/2025:  cervical biopsy:  MODERATELY DIFFERENTIATED SQUAMOUS CELL CARCINOMA, ARISING IN A BACKGROUND OF EXTENSIVE SEVERE DYSPLASIA.    THE TUMOR IS  PARTIALLY NECROTIC.     IMMUNOREACTION RESULTS:   P63: EXHIBITS NUCLEAR POSITIVITY IN NEOPLASTIC CELLS.   P16: EXHIBITS BLOCK POSITIVITY IN NEOPLASTIC CELLS.   CONTROL REACTIONS ARE APPROPRIATE.     Comment: Cervical squamous cell carcinoma is favored. No endometrium is identified.     CLINICAL HISTORY:       Patient: Lora Goins is a 69 y.o. female. with history of kindly referred for   cervical cancer.    Patient initially presented with watery vaginal discharge in March of this year that eventually progress to blood tinged discharge.  She did not have any other symptoms associated with it.  Patient did have a pelvic ultrasound on 03/14/2025 that showed and hypoechoic mass in the cervix measuring 5.3 x 4 x 4 cm.  There was distention of the endometrial cavity with fluid.  There was no free pelvic fluid.  Uterus was 11 x 5 x 5 cm.      She underwent cervical biopsy on 03/19/2025 that showed moderately differentiated squamous cell carcinoma arising in the background of extensive severe dysplasia with areas of necrosis.  Of note she has history of cervical dysplasia for which she received cryotherapy over 20 years ago.     She was seen by Dr. Jeremy Browne on 04/04/2025 because of the size of the tumor she is not a surgical candidate.    She is here today with her .  She is doing relatively well and voices no concerns.  No family history of breast, ovarian cancer    Chief Complaint: 1Week Follow Up      Interval History:    06/09/25: Patient returns to clinic for 1 week follow up for treatment clearance for C6 of weekly Cisplatin +XRT. OK to remove picc line after Tx. She is doing well today. Denies SOB, chest pain, fever, chills. Labs reviewed in detail with patient and , stable to continue with current treatment.    Past Medical History:   Diagnosis Date    Arthritis     Charcot Kayla Tooth muscular atrophy     Fibromyalgia     GERD (gastroesophageal reflux disease)     Hyperlipidemia      Hypertension     Hypertension     Restless leg syndrome       Past Surgical History:   Procedure Laterality Date     SECTION      2     SECTION      FOOT SURGERY      JOINT REPLACEMENT      KNEE ARTHROPLASTY Right 2020    Procedure: ARTHROPLASTY, KNEE - [employer];  Surgeon: Chapito Elkins MD;  Location: [facility] OR;  Service: Orthopedics;  Laterality: Right;    LITHOTRIPSY      around      PERIPHERALLY INSERTED CENTRAL CATHETER INSERTION N/A 2025    Procedure: Insertion-picc;  Surgeon: Nicole Adair MD;  Location: [facility] OR;  Service: Oncology;  Laterality: N/A;    REMOVAL OF PLANTAR WART USING LASER      SINUS SURGERY          TONSILLECTOMY      TOTAL KNEE ARTHROPLASTY Left 2019    Procedure: ARTHROPLASTY, KNEE, TOTAL-[employer]SY;  Surgeon: Chapito Elkins MD;  Location: [address];  Service: Orthopedics;  Laterality: Left;     Family History   Problem Relation Name Age of Onset    Cancer Mother          brain tumor , hip, blood stream, lungs    Cancer Father      Cancer Sister          lung tumor          Review of patient's allergies indicates:   Allergen Reactions    Ezetimibe-simvastatin      Cramps  Can take Ezetimibe    Lovastatin      cramps    Pitavastatin      cramps    Pravastatin      Muscle cramps      Medications Ordered Prior to Encounter[1]   Review of Systems   Constitutional:  Positive for fatigue. Negative for activity change, appetite change, chills, fever, night sweats and unexpected weight change.   HENT:  Negative for mouth dryness, mouth sores, nosebleeds, sore throat and trouble swallowing.    Eyes: Negative.  Negative for visual disturbance.   Respiratory:  Negative for cough and shortness of breath.    Cardiovascular:  Negative for chest pain, palpitations and leg swelling.   Gastrointestinal:  Negative for abdominal distention, abdominal pain, blood in stool, change in bowel habit, constipation, diarrhea, nausea and vomiting.  "  Endocrine: Negative.    Genitourinary:  Negative for dysuria, frequency, hematuria and urgency.   Musculoskeletal:  Negative for arthralgias, back pain, myalgias and neck pain.   Integumentary:  Negative for rash.   Neurological:  Positive for weakness. Negative for dizziness, tremors, syncope, speech difficulty, light-headedness, numbness, headaches and memory loss.   Hematological:  Negative for adenopathy. Does not bruise/bleed easily.   Psychiatric/Behavioral:  Negative for confusion and suicidal ideas. The patient is not nervous/anxious.               Vitals:    06/09/25 0851   BP: 119/80   BP Location: Left arm   Patient Position: Sitting   Pulse: 80   Resp: 18   Temp: 98.1 °F (36.7 °C)   TempSrc: Oral   SpO2: 97%   Weight: 76.2 kg (168 lb)   Height: 5' 2" (1.575 m)            Wt Readings from Last 6 Encounters:   06/09/25 76.2 kg (168 lb)   06/02/25 77.2 kg (170 lb 1.6 oz)   05/27/25 77.1 kg (169 lb 14.4 oz)   05/26/25 78.2 kg (172 lb 8 oz)   05/20/25 78.3 kg (172 lb 9.9 oz)   05/19/25 78.3 kg (172 lb 11.2 oz)     Body mass index is 30.73 kg/m².  Body surface area is 1.83 meters squared.  Physical Exam  Vitals and nursing note reviewed. Exam conducted with a chaperone present.   Constitutional:       General: She is not in acute distress.     Appearance: Normal appearance. She is well-developed.   HENT:      Head: Normocephalic and atraumatic.      Mouth/Throat:      Mouth: Mucous membranes are moist.   Eyes:      General: No scleral icterus.     Extraocular Movements: Extraocular movements intact.      Conjunctiva/sclera: Conjunctivae normal.      Pupils: Pupils are equal, round, and reactive to light.   Neck:      Vascular: No JVD.   Cardiovascular:      Rate and Rhythm: Normal rate and regular rhythm.      Heart sounds: No murmur heard.  Pulmonary:      Effort: Pulmonary effort is normal.      Breath sounds: Normal breath sounds. No wheezing or rhonchi.   Abdominal:      General: Bowel sounds are normal. " There is no distension.      Palpations: Abdomen is soft. There is no mass.      Tenderness: There is no abdominal tenderness.   Musculoskeletal:         General: No swelling or deformity.      Cervical back: Neck supple.      Comments: PICC line in place   Trace pedal edema of the lower extremities bilaterally   Lymphadenopathy:      Head:      Right side of head: No submental or submandibular adenopathy.      Left side of head: No submental or submandibular adenopathy.      Cervical: No cervical adenopathy.      Lower Body: No right inguinal adenopathy. No left inguinal adenopathy.   Skin:     General: Skin is warm.      Coloration: Skin is not jaundiced.      Findings: No lesion or rash.      Nails: There is no clubbing.   Neurological:      General: No focal deficit present.      Mental Status: She is alert and oriented to person, place, and time.      Cranial Nerves: Cranial nerves 2-12 are intact.   Psychiatric:         Attention and Perception: Attention normal.         Behavior: Behavior is cooperative.         Judgment: Judgment normal.       ECOG SCORE    1 - Restricted in strenuous activity-ambulatory and able to carry out work of a light nature         Laboratory:  CBC with Differential:  Lab Results   Component Value Date    WBC 2.32 (L) 06/09/2025    RBC 3.97 (L) 06/09/2025    HGB 11.2 (L) 06/09/2025    HCT 33.8 (L) 06/09/2025    MCV 85.1 06/09/2025    MCH 28.2 06/09/2025    MCHC 33.1 06/09/2025    RDW 15.4 06/09/2025     (L) 06/09/2025    MPV 9.1 06/09/2025    GRAN 3.2 07/27/2020    GRAN 52.6 07/27/2020    LYMPH 2.3 07/27/2020    LYMPH 37.3 07/27/2020    MONO 0.4 07/27/2020    MONO 6.5 07/27/2020    EOS 0.2 11/23/2022    BASO 0.0 11/23/2022    EOSINOPHIL 4 11/23/2022    BASOPHIL 0.8 07/27/2020        CMP:  Sodium   Date Value Ref Range Status   06/02/2025 138 136 - 145 mmol/L Final   07/27/2020 142 136 - 145 mmol/L Final     Potassium   Date Value Ref Range Status   06/02/2025 3.7 3.5 - 5.1  mmol/L Final   07/27/2020 3.9 3.5 - 5.1 mmol/L Final     Chloride   Date Value Ref Range Status   06/02/2025 101 98 - 107 mmol/L Final   07/27/2020 103 95 - 110 mmol/L Final     CO2   Date Value Ref Range Status   06/02/2025 25 23 - 31 mmol/L Final   07/27/2020 30 (H) 23 - 29 mmol/L Final     Glucose   Date Value Ref Range Status   06/02/2025 90 82 - 115 mg/dL Final   07/27/2020 161 (H) 70 - 110 mg/dL Final     BUN   Date Value Ref Range Status   07/27/2020 19 8 - 23 mg/dL Final     Blood Urea Nitrogen   Date Value Ref Range Status   06/02/2025 21.0 (H) 9.8 - 20.1 mg/dL Final     Creatinine   Date Value Ref Range Status   06/02/2025 0.88 0.55 - 1.02 mg/dL Final   07/27/2020 1.0 0.5 - 1.4 mg/dL Final     Calcium   Date Value Ref Range Status   06/02/2025 8.8 8.4 - 10.2 mg/dL Final   07/27/2020 9.4 8.7 - 10.5 mg/dL Final     Protein Total   Date Value Ref Range Status   06/02/2025 6.3 5.8 - 7.6 gm/dL Final     Albumin   Date Value Ref Range Status   06/02/2025 2.8 (L) 3.4 - 4.8 g/dL Final     Bilirubin Total   Date Value Ref Range Status   06/02/2025 0.4 <=1.5 mg/dL Final     ALP   Date Value Ref Range Status   06/02/2025 58 40 - 150 unit/L Final     AST   Date Value Ref Range Status   06/02/2025 16 11 - 45 unit/L Final     ALT   Date Value Ref Range Status   06/02/2025 14 0 - 55 unit/L Final     Anion Gap   Date Value Ref Range Status   07/27/2020 9 8 - 16 mmol/L Final     eGFR if    Date Value Ref Range Status   07/27/2020 >60.0 >60 mL/min/1.73 m^2 Final     eGFR if non    Date Value Ref Range Status   07/27/2020 59.7 (A) >60 mL/min/1.73 m^2 Final     Comment:     Calculation used to obtain the estimated glomerular filtration  rate (eGFR) is the CKD-EPI equation.                Assessment:       1. Malignant neoplasm of endocervix    2. 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 Chemotherapy-induced fatigue        1) Cervical cancer  --on  weekly cisplatin + XRT  --Immunodeficency due to chemotherapy    2) chemotherapy-related problems:  --chemotherapy-induced fatigue  --immunodeficiency secondary to chemotherapy  --Hypokalemia CMP with K 3.3---will add additional 20 mEq of KCL to her posthydration IVF's        Plan:           06/09/25  Continue with weekly Cisplatin+XRT C6 on 6/10/25-last infusion  Continue XRT as scheduled  OK to remove PICC line after Tx on 06/10/25  Weekly labs while on treatment- CBC, CMP times 3 weeks  RTC in 3 weeks with MD/NP, same day labs    The patient was seen, interviewed and examined. Pertinent lab and radiology studies were reviewed.   The patient was given ample opportunity to ask questions, and to the best of my abilities, all questions answered to satisfaction; patient demonstrated understanding of what we discussed and agreeable to the plan. Pt instructed to call should develop concerning signs/symptoms or have further questions.   Patient requires or will require continuous, longitudinal, and active collaborative plan of care related to this patient's health condition, cervical cancer - the management of which requires the direction of a practitioner with specialized clinical knowledge, skill, and expertise.         Nicole Adair MD  Hematology/Oncology  [facility]       Professional Services   I, Edda Haywood LPN, acted solely as a scribe for and in the presence of Dr. Nicole Adair, who performed these services.           [1]   Current Outpatient Medications on File Prior to Visit   Medication Sig Dispense Refill    (Magic mouthwash) 1:1:1 diphenhydrAMINE(Benadryl) 12.5mg/5ml liq, aluminum & magnesium hydroxide-simethicone (Maalox), LIDOcaine viscous 2% Swish and spit every 4 (four) hours as needed. for mouth sores      amLODIPine (NORVASC) 5 MG tablet Take 5 mg by mouth.      celecoxib (CELEBREX) 200 MG capsule Take 200 mg by mouth.      dexAMETHasone (DECADRON) 4 MG Tab Take 2  tablets (8 mg total) by mouth once daily. With food on days 2-4 of each chemotherapy cycle. 6 tablet 5    DULoxetine (CYMBALTA) 30 MG capsule Take 1 capsule by mouth once daily.      esomeprazole (NEXIUM) 40 MG capsule Take 1 capsule by mouth every morning.      ezetimibe (ZETIA) 10 mg tablet Take 10 mg by mouth every evening. Takes at night      gabapentin (NEURONTIN) 300 MG capsule Take 300 mg by mouth 2 (two) times daily. Take if needed      hydroCHLOROthiazide (HYDRODIURIL) 25 MG tablet Take 25 mg by mouth.      OLANZapine (ZYPREXA) 5 MG tablet Take 1 tablet by mouth nightly on days 1-4 of each chemotherapy cycle. 4 tablet 5    pramipexole (MIRAPEX) 0.5 MG tablet Take 0.5 mg by mouth.      prochlorperazine (COMPAZINE) 5 MG tablet Take 1 tablet (5 mg total) by mouth every 6 (six) hours as needed for Nausea. 20 tablet 5    rOPINIRole (REQUIP) 4 MG tablet Takes at night      traMADol (ULTRAM) 50 mg tablet 2 (two) times daily as needed. Pain    Take if needed       No current facility-administered medications on file prior to visit.

## 2025-06-10 ENCOUNTER — INFUSION (OUTPATIENT)
Dept: INFUSION THERAPY | Facility: HOSPITAL | Age: 70
End: 2025-06-10
Attending: INTERNAL MEDICINE
Payer: MEDICARE

## 2025-06-10 VITALS
DIASTOLIC BLOOD PRESSURE: 72 MMHG | TEMPERATURE: 98 F | OXYGEN SATURATION: 99 % | SYSTOLIC BLOOD PRESSURE: 122 MMHG | HEIGHT: 62 IN | WEIGHT: 168.31 LBS | RESPIRATION RATE: 16 BRPM | BODY MASS INDEX: 30.97 KG/M2 | HEART RATE: 84 BPM

## 2025-06-10 DIAGNOSIS — C53.9 MALIGNANT NEOPLASM OF CERVIX, UNSPECIFIED SITE: Primary | ICD-10-CM

## 2025-06-10 PROCEDURE — 96375 TX/PRO/DX INJ NEW DRUG ADDON: CPT

## 2025-06-10 PROCEDURE — 96367 TX/PROPH/DG ADDL SEQ IV INF: CPT

## 2025-06-10 PROCEDURE — 96413 CHEMO IV INFUSION 1 HR: CPT

## 2025-06-10 PROCEDURE — 77386 HC IMRT, COMPLEX: CPT | Performed by: RADIOLOGY

## 2025-06-10 PROCEDURE — 25000003 PHARM REV CODE 250: Performed by: INTERNAL MEDICINE

## 2025-06-10 PROCEDURE — 96361 HYDRATE IV INFUSION ADD-ON: CPT

## 2025-06-10 PROCEDURE — 63600175 PHARM REV CODE 636 W HCPCS: Performed by: INTERNAL MEDICINE

## 2025-06-10 RX ORDER — HEPARIN 100 UNIT/ML
500 SYRINGE INTRAVENOUS
Status: DISCONTINUED | OUTPATIENT
Start: 2025-06-10 | End: 2025-06-10 | Stop reason: HOSPADM

## 2025-06-10 RX ORDER — DIPHENHYDRAMINE HYDROCHLORIDE 50 MG/ML
50 INJECTION, SOLUTION INTRAMUSCULAR; INTRAVENOUS ONCE AS NEEDED
Status: DISCONTINUED | OUTPATIENT
Start: 2025-06-10 | End: 2025-06-10 | Stop reason: HOSPADM

## 2025-06-10 RX ORDER — PROCHLORPERAZINE EDISYLATE 5 MG/ML
5 INJECTION INTRAMUSCULAR; INTRAVENOUS ONCE AS NEEDED
Status: DISCONTINUED | OUTPATIENT
Start: 2025-06-10 | End: 2025-06-10 | Stop reason: HOSPADM

## 2025-06-10 RX ORDER — SODIUM CHLORIDE 0.9 % (FLUSH) 0.9 %
10 SYRINGE (ML) INJECTION
Status: DISCONTINUED | OUTPATIENT
Start: 2025-06-10 | End: 2025-06-10 | Stop reason: HOSPADM

## 2025-06-10 RX ORDER — EPINEPHRINE 0.3 MG/.3ML
0.3 INJECTION SUBCUTANEOUS ONCE AS NEEDED
Status: DISCONTINUED | OUTPATIENT
Start: 2025-06-10 | End: 2025-06-10 | Stop reason: HOSPADM

## 2025-06-10 RX ADMIN — POTASSIUM CHLORIDE 500 ML/HR: 2 INJECTION, SOLUTION, CONCENTRATE INTRAVENOUS at 08:06

## 2025-06-10 RX ADMIN — APREPITANT 130 MG: 130 INJECTION, EMULSION INTRAVENOUS at 10:06

## 2025-06-10 RX ADMIN — DEXAMETHASONE SODIUM PHOSPHATE 0.25 MG: 4 INJECTION, SOLUTION INTRA-ARTICULAR; INTRALESIONAL; INTRAMUSCULAR; INTRAVENOUS; SOFT TISSUE at 10:06

## 2025-06-10 RX ADMIN — SODIUM CHLORIDE: 9 INJECTION, SOLUTION INTRAVENOUS at 08:06

## 2025-06-10 RX ADMIN — CISPLATIN 56 MG: 1 INJECTION, SOLUTION INTRAVENOUS at 10:06

## 2025-06-10 RX ADMIN — SODIUM CHLORIDE 500 ML/HR: 9 INJECTION, SOLUTION INTRAVENOUS at 11:06

## 2025-06-10 NOTE — NURSING
PICC Removal Note:  PICC line removed from right antecubital after sterile site prepped per protocol. PICC catheter tip measured at 35cm.  Dressed with vaseline gauze, sterile 4x4s and tegaderm.  Direct pressure applied for 5 minutes.  Observed for 15 min to assure hemostasis.    No redness, ecchymosis, edema, swelling, or drainage noted at site.  Instructions provided on post PICC discharge care, including followup notification instructions. Pt tolerated treatment with no complaints.  Next appt reviewed with pt.

## 2025-06-11 PROCEDURE — 77386 HC IMRT, COMPLEX: CPT | Performed by: RADIOLOGY

## 2025-06-16 ENCOUNTER — LAB VISIT (OUTPATIENT)
Dept: LAB | Facility: HOSPITAL | Age: 70
End: 2025-06-16
Attending: INTERNAL MEDICINE
Payer: MEDICARE

## 2025-06-16 DIAGNOSIS — C53.9 MALIGNANT NEOPLASM OF CERVIX, UNSPECIFIED SITE: ICD-10-CM

## 2025-06-16 LAB
ALBUMIN SERPL-MCNC: 2.8 G/DL (ref 3.4–4.8)
ALBUMIN/GLOB SERPL: 0.8 RATIO (ref 1.1–2)
ALP SERPL-CCNC: 68 UNIT/L (ref 40–150)
ALT SERPL-CCNC: 19 UNIT/L (ref 0–55)
ANION GAP SERPL CALC-SCNC: 7 MEQ/L
AST SERPL-CCNC: 14 UNIT/L (ref 11–45)
BASOPHILS # BLD AUTO: 0 X10(3)/MCL
BASOPHILS NFR BLD AUTO: 0 %
BILIRUB SERPL-MCNC: 0.5 MG/DL
BUN SERPL-MCNC: 20.8 MG/DL (ref 9.8–20.1)
CALCIUM SERPL-MCNC: 8.9 MG/DL (ref 8.4–10.2)
CHLORIDE SERPL-SCNC: 102 MMOL/L (ref 98–107)
CO2 SERPL-SCNC: 31 MMOL/L (ref 23–31)
CREAT SERPL-MCNC: 0.98 MG/DL (ref 0.55–1.02)
CREAT/UREA NIT SERPL: 21
EOSINOPHIL # BLD AUTO: 0.07 X10(3)/MCL (ref 0–0.9)
EOSINOPHIL NFR BLD AUTO: 4.4 %
ERYTHROCYTE [DISTWIDTH] IN BLOOD BY AUTOMATED COUNT: 16.3 % (ref 11.5–17)
GFR SERPLBLD CREATININE-BSD FMLA CKD-EPI: >60 ML/MIN/1.73/M2
GLOBULIN SER-MCNC: 3.5 GM/DL (ref 2.4–3.5)
GLUCOSE SERPL-MCNC: 122 MG/DL (ref 82–115)
HCT VFR BLD AUTO: 32 % (ref 37–47)
HGB BLD-MCNC: 10.8 G/DL (ref 12–16)
IMM GRANULOCYTES # BLD AUTO: 0.02 X10(3)/MCL (ref 0–0.04)
IMM GRANULOCYTES NFR BLD AUTO: 1.3 %
LYMPHOCYTES # BLD AUTO: 0.26 X10(3)/MCL (ref 0.6–4.6)
LYMPHOCYTES NFR BLD AUTO: 16.3 %
MCH RBC QN AUTO: 28.5 PG (ref 27–31)
MCHC RBC AUTO-ENTMCNC: 33.8 G/DL (ref 33–36)
MCV RBC AUTO: 84.4 FL (ref 80–94)
MONOCYTES # BLD AUTO: 0.24 X10(3)/MCL (ref 0.1–1.3)
MONOCYTES NFR BLD AUTO: 15 %
NEUTROPHILS # BLD AUTO: 1.01 X10(3)/MCL (ref 2.1–9.2)
NEUTROPHILS NFR BLD AUTO: 63 %
PLATELET # BLD AUTO: 110 X10(3)/MCL (ref 130–400)
PMV BLD AUTO: 9 FL (ref 7.4–10.4)
POTASSIUM SERPL-SCNC: 3.6 MMOL/L (ref 3.5–5.1)
PROT SERPL-MCNC: 6.3 GM/DL (ref 5.8–7.6)
RBC # BLD AUTO: 3.79 X10(6)/MCL (ref 4.2–5.4)
SODIUM SERPL-SCNC: 140 MMOL/L (ref 136–145)
WBC # BLD AUTO: 1.6 X10(3)/MCL (ref 4.5–11.5)

## 2025-06-16 PROCEDURE — 77317 BRACHYTX ISODOSE INTERMED: CPT | Performed by: RADIOLOGY

## 2025-06-16 PROCEDURE — 77290 THER RAD SIMULAJ FIELD CPLX: CPT | Performed by: RADIOLOGY

## 2025-06-16 PROCEDURE — 77771 HDR RDNCL NTRSTL/ICAV BRCHTX: CPT | Performed by: RADIOLOGY

## 2025-06-16 PROCEDURE — 36415 COLL VENOUS BLD VENIPUNCTURE: CPT

## 2025-06-16 PROCEDURE — 85025 COMPLETE CBC W/AUTO DIFF WBC: CPT

## 2025-06-16 PROCEDURE — 80053 COMPREHEN METABOLIC PANEL: CPT

## 2025-06-18 PROCEDURE — 77317 BRACHYTX ISODOSE INTERMED: CPT | Performed by: RADIOLOGY

## 2025-06-18 PROCEDURE — 77290 THER RAD SIMULAJ FIELD CPLX: CPT | Performed by: RADIOLOGY

## 2025-06-18 PROCEDURE — 77771 HDR RDNCL NTRSTL/ICAV BRCHTX: CPT | Performed by: RADIOLOGY

## 2025-06-23 ENCOUNTER — LAB VISIT (OUTPATIENT)
Dept: LAB | Facility: HOSPITAL | Age: 70
End: 2025-06-23
Attending: INTERNAL MEDICINE
Payer: MEDICARE

## 2025-06-23 DIAGNOSIS — C53.9 MALIGNANT NEOPLASM OF CERVIX, UNSPECIFIED SITE: ICD-10-CM

## 2025-06-23 LAB
ALBUMIN SERPL-MCNC: 2.7 G/DL (ref 3.4–4.8)
ALBUMIN/GLOB SERPL: 0.8 RATIO (ref 1.1–2)
ALP SERPL-CCNC: 74 UNIT/L (ref 40–150)
ALT SERPL-CCNC: 17 UNIT/L (ref 0–55)
ANION GAP SERPL CALC-SCNC: 7 MEQ/L
AST SERPL-CCNC: 18 UNIT/L (ref 11–45)
BASOPHILS # BLD AUTO: 0.01 X10(3)/MCL
BASOPHILS NFR BLD AUTO: 0.5 %
BILIRUB SERPL-MCNC: 0.5 MG/DL
BUN SERPL-MCNC: 20 MG/DL (ref 9.8–20.1)
CALCIUM SERPL-MCNC: 8.3 MG/DL (ref 8.4–10.2)
CHLORIDE SERPL-SCNC: 102 MMOL/L (ref 98–107)
CO2 SERPL-SCNC: 29 MMOL/L (ref 23–31)
CREAT SERPL-MCNC: 1.28 MG/DL (ref 0.55–1.02)
CREAT/UREA NIT SERPL: 16
EOSINOPHIL # BLD AUTO: 0.04 X10(3)/MCL (ref 0–0.9)
EOSINOPHIL NFR BLD AUTO: 2.1 %
ERYTHROCYTE [DISTWIDTH] IN BLOOD BY AUTOMATED COUNT: 18.4 % (ref 11.5–17)
GFR SERPLBLD CREATININE-BSD FMLA CKD-EPI: 45 ML/MIN/1.73/M2
GLOBULIN SER-MCNC: 3.5 GM/DL (ref 2.4–3.5)
GLUCOSE SERPL-MCNC: 128 MG/DL (ref 82–115)
HCT VFR BLD AUTO: 27.8 % (ref 37–47)
HGB BLD-MCNC: 9.3 G/DL (ref 12–16)
IMM GRANULOCYTES # BLD AUTO: 0.02 X10(3)/MCL (ref 0–0.04)
IMM GRANULOCYTES NFR BLD AUTO: 1.1 %
LYMPHOCYTES # BLD AUTO: 0.58 X10(3)/MCL (ref 0.6–4.6)
LYMPHOCYTES NFR BLD AUTO: 31 %
MCH RBC QN AUTO: 29.2 PG (ref 27–31)
MCHC RBC AUTO-ENTMCNC: 33.5 G/DL (ref 33–36)
MCV RBC AUTO: 87.1 FL (ref 80–94)
MONOCYTES # BLD AUTO: 0.31 X10(3)/MCL (ref 0.1–1.3)
MONOCYTES NFR BLD AUTO: 16.6 %
NEUTROPHILS # BLD AUTO: 0.91 X10(3)/MCL (ref 2.1–9.2)
NEUTROPHILS NFR BLD AUTO: 48.7 %
PLATELET # BLD AUTO: 161 X10(3)/MCL (ref 130–400)
PMV BLD AUTO: 9.1 FL (ref 7.4–10.4)
POTASSIUM SERPL-SCNC: 3.1 MMOL/L (ref 3.5–5.1)
PROT SERPL-MCNC: 6.2 GM/DL (ref 5.8–7.6)
RBC # BLD AUTO: 3.19 X10(6)/MCL (ref 4.2–5.4)
SODIUM SERPL-SCNC: 138 MMOL/L (ref 136–145)
WBC # BLD AUTO: 1.87 X10(3)/MCL (ref 4.5–11.5)

## 2025-06-23 PROCEDURE — 77290 THER RAD SIMULAJ FIELD CPLX: CPT | Performed by: RADIOLOGY

## 2025-06-23 PROCEDURE — 85025 COMPLETE CBC W/AUTO DIFF WBC: CPT

## 2025-06-23 PROCEDURE — 77771 HDR RDNCL NTRSTL/ICAV BRCHTX: CPT | Performed by: RADIOLOGY

## 2025-06-23 PROCEDURE — 36415 COLL VENOUS BLD VENIPUNCTURE: CPT

## 2025-06-23 PROCEDURE — 80053 COMPREHEN METABOLIC PANEL: CPT

## 2025-06-23 PROCEDURE — 77317 BRACHYTX ISODOSE INTERMED: CPT | Performed by: RADIOLOGY

## 2025-06-25 PROCEDURE — 77290 THER RAD SIMULAJ FIELD CPLX: CPT | Performed by: RADIOLOGY

## 2025-06-25 PROCEDURE — 77317 BRACHYTX ISODOSE INTERMED: CPT | Performed by: RADIOLOGY

## 2025-06-25 PROCEDURE — 77771 HDR RDNCL NTRSTL/ICAV BRCHTX: CPT | Performed by: RADIOLOGY

## 2025-06-30 ENCOUNTER — LAB VISIT (OUTPATIENT)
Dept: LAB | Facility: HOSPITAL | Age: 70
End: 2025-06-30
Attending: INTERNAL MEDICINE
Payer: MEDICARE

## 2025-06-30 DIAGNOSIS — R97.8 OTHER ABNORMAL TUMOR MARKERS: ICD-10-CM

## 2025-06-30 DIAGNOSIS — C53.0 MALIGNANT NEOPLASM OF ENDOCERVIX: ICD-10-CM

## 2025-06-30 DIAGNOSIS — C53.9 MALIGNANT NEOPLASM OF CERVIX, UNSPECIFIED SITE: ICD-10-CM

## 2025-06-30 LAB
ALBUMIN SERPL-MCNC: 2.9 G/DL (ref 3.4–4.8)
ALBUMIN/GLOB SERPL: 0.9 RATIO (ref 1.1–2)
ALP SERPL-CCNC: 70 UNIT/L (ref 40–150)
ALT SERPL-CCNC: 14 UNIT/L (ref 0–55)
ANION GAP SERPL CALC-SCNC: 5 MEQ/L
AST SERPL-CCNC: 17 UNIT/L (ref 11–45)
BASOPHILS # BLD AUTO: 0.01 X10(3)/MCL
BASOPHILS NFR BLD AUTO: 0.5 %
BILIRUB SERPL-MCNC: 0.4 MG/DL
BUN SERPL-MCNC: 13.2 MG/DL (ref 9.8–20.1)
CALCIUM SERPL-MCNC: 8.8 MG/DL (ref 8.4–10.2)
CANCER AG125 SERPL-ACNC: 24.6 UNIT/ML (ref 0–35)
CHLORIDE SERPL-SCNC: 105 MMOL/L (ref 98–107)
CO2 SERPL-SCNC: 31 MMOL/L (ref 23–31)
CREAT SERPL-MCNC: 0.92 MG/DL (ref 0.55–1.02)
CREAT/UREA NIT SERPL: 14
EOSINOPHIL # BLD AUTO: 0.06 X10(3)/MCL (ref 0–0.9)
EOSINOPHIL NFR BLD AUTO: 3.2 %
ERYTHROCYTE [DISTWIDTH] IN BLOOD BY AUTOMATED COUNT: 20.2 % (ref 11.5–17)
GFR SERPLBLD CREATININE-BSD FMLA CKD-EPI: >60 ML/MIN/1.73/M2
GLOBULIN SER-MCNC: 3.4 GM/DL (ref 2.4–3.5)
GLUCOSE SERPL-MCNC: 107 MG/DL (ref 82–115)
HCT VFR BLD AUTO: 28.6 % (ref 37–47)
HGB BLD-MCNC: 9.4 G/DL (ref 12–16)
IMM GRANULOCYTES # BLD AUTO: 0.03 X10(3)/MCL (ref 0–0.04)
IMM GRANULOCYTES NFR BLD AUTO: 1.6 %
LYMPHOCYTES # BLD AUTO: 0.62 X10(3)/MCL (ref 0.6–4.6)
LYMPHOCYTES NFR BLD AUTO: 33.5 %
MCH RBC QN AUTO: 29.8 PG (ref 27–31)
MCHC RBC AUTO-ENTMCNC: 32.9 G/DL (ref 33–36)
MCV RBC AUTO: 90.8 FL (ref 80–94)
MONOCYTES # BLD AUTO: 0.43 X10(3)/MCL (ref 0.1–1.3)
MONOCYTES NFR BLD AUTO: 23.2 %
NEUTROPHILS # BLD AUTO: 0.7 X10(3)/MCL (ref 2.1–9.2)
NEUTROPHILS NFR BLD AUTO: 38 %
PLATELET # BLD AUTO: 202 X10(3)/MCL (ref 130–400)
PMV BLD AUTO: 8.6 FL (ref 7.4–10.4)
POTASSIUM SERPL-SCNC: 3.7 MMOL/L (ref 3.5–5.1)
PROT SERPL-MCNC: 6.3 GM/DL (ref 5.8–7.6)
RBC # BLD AUTO: 3.15 X10(6)/MCL (ref 4.2–5.4)
SODIUM SERPL-SCNC: 141 MMOL/L (ref 136–145)
WBC # BLD AUTO: 1.85 X10(3)/MCL (ref 4.5–11.5)

## 2025-06-30 PROCEDURE — 77338 DESIGN MLC DEVICE FOR IMRT: CPT | Performed by: RADIOLOGY

## 2025-06-30 PROCEDURE — 77290 THER RAD SIMULAJ FIELD CPLX: CPT | Performed by: RADIOLOGY

## 2025-06-30 PROCEDURE — 36415 COLL VENOUS BLD VENIPUNCTURE: CPT

## 2025-06-30 PROCEDURE — 77300 RADIATION THERAPY DOSE PLAN: CPT | Performed by: RADIOLOGY

## 2025-06-30 PROCEDURE — 85025 COMPLETE CBC W/AUTO DIFF WBC: CPT

## 2025-06-30 PROCEDURE — 77771 HDR RDNCL NTRSTL/ICAV BRCHTX: CPT | Performed by: RADIOLOGY

## 2025-06-30 PROCEDURE — 86304 IMMUNOASSAY TUMOR CA 125: CPT

## 2025-06-30 PROCEDURE — 80053 COMPREHEN METABOLIC PANEL: CPT

## 2025-06-30 PROCEDURE — 77317 BRACHYTX ISODOSE INTERMED: CPT | Performed by: RADIOLOGY

## 2025-07-01 ENCOUNTER — CLINICAL SUPPORT (OUTPATIENT)
Dept: RADIATION THERAPY | Facility: HOSPITAL | Age: 70
End: 2025-07-01
Attending: RADIOLOGY
Payer: MEDICARE

## 2025-07-01 PROCEDURE — 77386 HC IMRT, COMPLEX: CPT | Performed by: RADIOLOGY

## 2025-07-02 PROCEDURE — 77386 HC IMRT, COMPLEX: CPT

## 2025-07-03 PROCEDURE — 77386 HC IMRT, COMPLEX: CPT | Performed by: RADIOLOGY

## 2025-07-03 PROCEDURE — 77336 RADIATION PHYSICS CONSULT: CPT | Performed by: RADIOLOGY

## 2025-07-20 NOTE — ASSESSMENT & PLAN NOTE
Charcot Kayla Tooth related   Not a diabetic  Feet care suggested  No open areas of feet    Pt refusing Mg and Kcl IV supplements. Dr Alva made aware via Perfect [employer].

## 2025-08-06 ENCOUNTER — LAB VISIT (OUTPATIENT)
Dept: LAB | Facility: HOSPITAL | Age: 70
End: 2025-08-06
Attending: NURSE PRACTITIONER
Payer: MEDICARE

## 2025-08-06 DIAGNOSIS — E83.10 DISORDER OF IRON METABOLISM, UNSPECIFIED: ICD-10-CM

## 2025-08-06 DIAGNOSIS — E78.2 MIXED HYPERLIPIDEMIA: Primary | ICD-10-CM

## 2025-08-06 DIAGNOSIS — I10 ESSENTIAL HYPERTENSION, MALIGNANT: ICD-10-CM

## 2025-08-06 LAB
25(OH)D3+25(OH)D2 SERPL-MCNC: 29 NG/ML (ref 30–80)
ALBUMIN SERPL-MCNC: 3.3 G/DL (ref 3.4–4.8)
ALBUMIN/GLOB SERPL: 0.9 RATIO (ref 1.1–2)
ALP SERPL-CCNC: 60 UNIT/L (ref 40–150)
ALT SERPL-CCNC: 14 UNIT/L (ref 0–55)
ANION GAP SERPL CALC-SCNC: 6 MEQ/L
AST SERPL-CCNC: 18 UNIT/L (ref 11–45)
BASOPHILS # BLD AUTO: 0.04 X10(3)/MCL
BASOPHILS NFR BLD AUTO: 1.3 %
BILIRUB SERPL-MCNC: 0.7 MG/DL
BUN SERPL-MCNC: 19.8 MG/DL (ref 9.8–20.1)
CALCIUM SERPL-MCNC: 9.4 MG/DL (ref 8.4–10.2)
CHLORIDE SERPL-SCNC: 107 MMOL/L (ref 98–107)
CHOLEST SERPL-MCNC: 195 MG/DL
CHOLEST/HDLC SERPL: 4 {RATIO} (ref 0–5)
CO2 SERPL-SCNC: 28 MMOL/L (ref 23–31)
CREAT SERPL-MCNC: 1 MG/DL (ref 0.55–1.02)
CREAT/UREA NIT SERPL: 20
EOSINOPHIL # BLD AUTO: 0.13 X10(3)/MCL (ref 0–0.9)
EOSINOPHIL NFR BLD AUTO: 4.2 %
ERYTHROCYTE [DISTWIDTH] IN BLOOD BY AUTOMATED COUNT: 14.5 % (ref 11.5–17)
EST. AVERAGE GLUCOSE BLD GHB EST-MCNC: 102.5 MG/DL
GFR SERPLBLD CREATININE-BSD FMLA CKD-EPI: >60 ML/MIN/1.73/M2
GLOBULIN SER-MCNC: 3.8 GM/DL (ref 2.4–3.5)
GLUCOSE SERPL-MCNC: 97 MG/DL (ref 82–115)
HBA1C MFR BLD: 5.2 %
HCT VFR BLD AUTO: 36.6 % (ref 37–47)
HDLC SERPL-MCNC: 53 MG/DL (ref 35–60)
HGB BLD-MCNC: 12.3 G/DL (ref 12–16)
IMM GRANULOCYTES # BLD AUTO: 0 X10(3)/MCL (ref 0–0.04)
IMM GRANULOCYTES NFR BLD AUTO: 0 %
LDLC SERPL CALC-MCNC: 114 MG/DL (ref 50–140)
LYMPHOCYTES # BLD AUTO: 0.84 X10(3)/MCL (ref 0.6–4.6)
LYMPHOCYTES NFR BLD AUTO: 27.3 %
MCH RBC QN AUTO: 30.7 PG (ref 27–31)
MCHC RBC AUTO-ENTMCNC: 33.6 G/DL (ref 33–36)
MCV RBC AUTO: 91.3 FL (ref 80–94)
MONOCYTES # BLD AUTO: 0.42 X10(3)/MCL (ref 0.1–1.3)
MONOCYTES NFR BLD AUTO: 13.6 %
NEUTROPHILS # BLD AUTO: 1.65 X10(3)/MCL (ref 2.1–9.2)
NEUTROPHILS NFR BLD AUTO: 53.6 %
PLATELET # BLD AUTO: 212 X10(3)/MCL (ref 130–400)
PMV BLD AUTO: 9.9 FL (ref 7.4–10.4)
POTASSIUM SERPL-SCNC: 4.8 MMOL/L (ref 3.5–5.1)
PROT SERPL-MCNC: 7.1 GM/DL (ref 5.8–7.6)
RBC # BLD AUTO: 4.01 X10(6)/MCL (ref 4.2–5.4)
SODIUM SERPL-SCNC: 141 MMOL/L (ref 136–145)
T4 FREE SERPL-MCNC: 0.98 NG/DL (ref 0.7–1.48)
TRIGL SERPL-MCNC: 138 MG/DL (ref 37–140)
TSH SERPL-ACNC: 1.25 UIU/ML (ref 0.35–4.94)
VLDLC SERPL CALC-MCNC: 28 MG/DL
WBC # BLD AUTO: 3.08 X10(3)/MCL (ref 4.5–11.5)

## 2025-08-06 PROCEDURE — 83036 HEMOGLOBIN GLYCOSYLATED A1C: CPT

## 2025-08-06 PROCEDURE — 36415 COLL VENOUS BLD VENIPUNCTURE: CPT

## 2025-08-06 PROCEDURE — 82306 VITAMIN D 25 HYDROXY: CPT

## 2025-08-06 PROCEDURE — 80053 COMPREHEN METABOLIC PANEL: CPT

## 2025-08-06 PROCEDURE — 84443 ASSAY THYROID STIM HORMONE: CPT

## 2025-08-06 PROCEDURE — 80061 LIPID PANEL: CPT

## 2025-08-06 PROCEDURE — 84439 ASSAY OF FREE THYROXINE: CPT

## 2025-08-06 PROCEDURE — 85025 COMPLETE CBC W/AUTO DIFF WBC: CPT

## (undated) DEVICE — PADDING CAST 3 X 4YD

## (undated) DEVICE — SUT VICRYL PLUS 2-0 CT1 18

## (undated) DEVICE — SOL IRR NACL .9% 3000ML

## (undated) DEVICE — MASK FLYTE HOOD PEEL AWAY

## (undated) DEVICE — TAPE SURG DURAPORE 2 X10YD

## (undated) DEVICE — SET DECANTER MEDICHOICE

## (undated) DEVICE — SEE MEDLINE ITEM 146271

## (undated) DEVICE — SUT STRATAFIX SPRL PS-2 3-0

## (undated) DEVICE — PAD CAST SPECIALIST STRL 6

## (undated) DEVICE — ADHESIVE DERMABOND ADVANCED

## (undated) DEVICE — WRAP PROTECTIVE LEG POS STRL

## (undated) DEVICE — BLADE SAGITTAL 18 X 1.27 X 90M

## (undated) DEVICE — SEE MEDLINE ITEM 152487

## (undated) DEVICE — TAPE SILK 3IN

## (undated) DEVICE — SUT VICRYL+ 1 CT1 18IN

## (undated) DEVICE — PUMP COLD THERAPY

## (undated) DEVICE — SET CEMENT (SCULP)

## (undated) DEVICE — Device

## (undated) DEVICE — DRESSING AQUACEL AG ADV 3.5X12

## (undated) DEVICE — TOURNIQUET SB QC DP 34X4IN

## (undated) DEVICE — SEE MEDLINE ITEM 146298

## (undated) DEVICE — UNDERGLOVES BIOGEL PI SIZE 8.5

## (undated) DEVICE — DRAPE INCISE IOBAN 2 23X33IN

## (undated) DEVICE — BOWL CEMENT

## (undated) DEVICE — KIT IRR SUCTION HND PIECE

## (undated) DEVICE — SUT MONOCRYL 3-0 PS-1

## (undated) DEVICE — SEE MEDLINE ITEM 154981

## (undated) DEVICE — SYR ONLY LUER LOCK 20CC

## (undated) DEVICE — NDL 18GA X1 1/2 REG BEVEL

## (undated) DEVICE — SYR 50CC LL

## (undated) DEVICE — SUT VICRYL PLUS 0 CT1 18IN

## (undated) DEVICE — KIT TOTAL KNEE TKOFG

## (undated) DEVICE — ELECTRODE REM PLYHSV RETURN 9

## (undated) DEVICE — SEE MEDLINE ITEM 157131

## (undated) DEVICE — PULSAVAC ZIMMER

## (undated) DEVICE — DRAPE STERI INSTRUMENT 1018

## (undated) DEVICE — HOOD T-5 TEAR AWAY STERILE

## (undated) DEVICE — PAD COLD THERAPY KNEE WRAP ON

## (undated) DEVICE — DRESSING AQUACEL AG 3.5X10IN

## (undated) DEVICE — GLOVE BIOGEL SKINSENSE PI 8.0

## (undated) DEVICE — SEE MEDLINE ITEM 146231